# Patient Record
Sex: MALE | Race: ASIAN | NOT HISPANIC OR LATINO | Employment: OTHER | URBAN - METROPOLITAN AREA
[De-identification: names, ages, dates, MRNs, and addresses within clinical notes are randomized per-mention and may not be internally consistent; named-entity substitution may affect disease eponyms.]

---

## 2018-01-01 ENCOUNTER — ANESTHESIA (OUTPATIENT)
Dept: INTENSIVE CARE | Facility: HOSPITAL | Age: 63
DRG: 064 | End: 2018-01-01
Payer: MEDICARE

## 2018-01-01 ENCOUNTER — ANESTHESIA EVENT (OUTPATIENT)
Dept: INTENSIVE CARE | Facility: HOSPITAL | Age: 63
DRG: 064 | End: 2018-01-01
Payer: MEDICARE

## 2018-01-01 ENCOUNTER — HOSPITAL ENCOUNTER (INPATIENT)
Facility: HOSPITAL | Age: 63
LOS: 14 days | DRG: 064 | End: 2018-05-10
Attending: EMERGENCY MEDICINE | Admitting: HOSPITALIST
Payer: MEDICARE

## 2018-01-01 VITALS
DIASTOLIC BLOOD PRESSURE: 56 MMHG | BODY MASS INDEX: 22.89 KG/M2 | WEIGHT: 137.38 LBS | OXYGEN SATURATION: 60 % | TEMPERATURE: 99 F | SYSTOLIC BLOOD PRESSURE: 121 MMHG | HEIGHT: 65 IN

## 2018-01-01 DIAGNOSIS — R05.9 COUGH: ICD-10-CM

## 2018-01-01 DIAGNOSIS — N17.9 ACUTE RENAL FAILURE, UNSPECIFIED ACUTE RENAL FAILURE TYPE: Primary | ICD-10-CM

## 2018-01-01 DIAGNOSIS — I63.9 STROKE: ICD-10-CM

## 2018-01-01 DIAGNOSIS — I69.359 CVA, OLD, HEMIPARESIS: ICD-10-CM

## 2018-01-01 DIAGNOSIS — I63.9 CVA (CEREBRAL VASCULAR ACCIDENT): ICD-10-CM

## 2018-01-01 DIAGNOSIS — R53.1 WEAKNESS: ICD-10-CM

## 2018-01-01 LAB
ALBUMIN SERPL BCP-MCNC: 2 G/DL
ALBUMIN SERPL BCP-MCNC: 2.1 G/DL
ALBUMIN SERPL BCP-MCNC: 2.2 G/DL
ALBUMIN SERPL BCP-MCNC: 2.2 G/DL
ALBUMIN SERPL BCP-MCNC: 2.7 G/DL
ALBUMIN SERPL BCP-MCNC: 2.9 G/DL
ALBUMIN SERPL BCP-MCNC: 3.8 G/DL
ALLENS TEST: ABNORMAL
ALP SERPL-CCNC: 60 U/L
ALP SERPL-CCNC: 65 U/L
ALP SERPL-CCNC: 66 U/L
ALP SERPL-CCNC: 67 U/L
ALP SERPL-CCNC: 69 U/L
ALP SERPL-CCNC: 71 U/L
ALP SERPL-CCNC: 78 U/L
ALT SERPL W/O P-5'-P-CCNC: 22 U/L
ALT SERPL W/O P-5'-P-CCNC: 22 U/L
ALT SERPL W/O P-5'-P-CCNC: 27 U/L
ALT SERPL W/O P-5'-P-CCNC: 47 U/L
ALT SERPL W/O P-5'-P-CCNC: 67 U/L
ALT SERPL W/O P-5'-P-CCNC: 73 U/L
ALT SERPL W/O P-5'-P-CCNC: 73 U/L
ALT SERPL W/O P-5'-P-CCNC: 75 U/L
ALT SERPL W/O P-5'-P-CCNC: 76 U/L
ANION GAP SERPL CALC-SCNC: 10 MMOL/L
ANION GAP SERPL CALC-SCNC: 10 MMOL/L
ANION GAP SERPL CALC-SCNC: 11 MMOL/L
ANION GAP SERPL CALC-SCNC: 11 MMOL/L
ANION GAP SERPL CALC-SCNC: 12 MMOL/L
ANION GAP SERPL CALC-SCNC: 12 MMOL/L
ANION GAP SERPL CALC-SCNC: 14 MMOL/L
ANION GAP SERPL CALC-SCNC: 15 MMOL/L
ANION GAP SERPL CALC-SCNC: 7 MMOL/L
ANION GAP SERPL CALC-SCNC: 8 MMOL/L
ANION GAP SERPL CALC-SCNC: 9 MMOL/L
ANION GAP SERPL CALC-SCNC: 9 MMOL/L
AORTIC VALVE REGURGITATION: ABNORMAL
APTT BLDCRRT: 25.3 SEC
APTT BLDCRRT: 26.4 SEC
APTT BLDCRRT: 28.5 SEC
AST SERPL-CCNC: 20 U/L
AST SERPL-CCNC: 27 U/L
AST SERPL-CCNC: 30 U/L
AST SERPL-CCNC: 49 U/L
AST SERPL-CCNC: 57 U/L
AST SERPL-CCNC: 57 U/L
AST SERPL-CCNC: 60 U/L
AST SERPL-CCNC: 60 U/L
AST SERPL-CCNC: 65 U/L
BACTERIA #/AREA URNS HPF: ABNORMAL /HPF
BACTERIA #/AREA URNS HPF: ABNORMAL /HPF
BACTERIA BLD CULT: NORMAL
BACTERIA BLD CULT: NORMAL
BACTERIA SPEC AEROBE CULT: NORMAL
BACTERIA UR CULT: NO GROWTH
BASOPHILS # BLD AUTO: 0.01 K/UL
BASOPHILS # BLD AUTO: 0.02 K/UL
BASOPHILS # BLD AUTO: 0.03 K/UL
BASOPHILS # BLD AUTO: 0.03 K/UL
BASOPHILS NFR BLD: 0.1 %
BASOPHILS NFR BLD: 0.1 %
BASOPHILS NFR BLD: 0.2 %
BASOPHILS NFR BLD: 0.3 %
BASOPHILS NFR BLD: 0.5 %
BILIRUB SERPL-MCNC: 0.3 MG/DL
BILIRUB SERPL-MCNC: 0.4 MG/DL
BILIRUB SERPL-MCNC: 0.5 MG/DL
BILIRUB SERPL-MCNC: 0.7 MG/DL
BILIRUB UR QL STRIP: NEGATIVE
BILIRUB UR QL STRIP: NEGATIVE
BNP SERPL-MCNC: 1086 PG/ML
BNP SERPL-MCNC: 201 PG/ML
BNP SERPL-MCNC: 672 PG/ML
BUN SERPL-MCNC: 21 MG/DL
BUN SERPL-MCNC: 23 MG/DL
BUN SERPL-MCNC: 29 MG/DL
BUN SERPL-MCNC: 30 MG/DL
BUN SERPL-MCNC: 40 MG/DL
BUN SERPL-MCNC: 44 MG/DL
BUN SERPL-MCNC: 51 MG/DL
BUN SERPL-MCNC: 54 MG/DL
BUN SERPL-MCNC: 56 MG/DL
BUN SERPL-MCNC: 57 MG/DL
BUN SERPL-MCNC: 60 MG/DL
BUN SERPL-MCNC: 60 MG/DL
CALCIUM SERPL-MCNC: 8.3 MG/DL
CALCIUM SERPL-MCNC: 8.3 MG/DL
CALCIUM SERPL-MCNC: 8.4 MG/DL
CALCIUM SERPL-MCNC: 8.6 MG/DL
CALCIUM SERPL-MCNC: 8.6 MG/DL
CALCIUM SERPL-MCNC: 8.7 MG/DL
CALCIUM SERPL-MCNC: 8.8 MG/DL
CALCIUM SERPL-MCNC: 8.9 MG/DL
CALCIUM SERPL-MCNC: 9.9 MG/DL
CHLORIDE SERPL-SCNC: 105 MMOL/L
CHLORIDE SERPL-SCNC: 108 MMOL/L
CHLORIDE SERPL-SCNC: 108 MMOL/L
CHLORIDE SERPL-SCNC: 110 MMOL/L
CHLORIDE SERPL-SCNC: 111 MMOL/L
CHLORIDE SERPL-SCNC: 111 MMOL/L
CHLORIDE SERPL-SCNC: 112 MMOL/L
CHLORIDE SERPL-SCNC: 113 MMOL/L
CHLORIDE SERPL-SCNC: 113 MMOL/L
CHLORIDE SERPL-SCNC: 114 MMOL/L
CHOLEST SERPL-MCNC: 229 MG/DL
CHOLEST/HDLC SERPL: 4.7 {RATIO}
CK MB SERPL-MCNC: 5.7 NG/ML
CK MB SERPL-RTO: 0.5 %
CK SERPL-CCNC: 1252 U/L
CLARITY UR: CLEAR
CLARITY UR: CLEAR
CO2 SERPL-SCNC: 16 MMOL/L
CO2 SERPL-SCNC: 17 MMOL/L
CO2 SERPL-SCNC: 21 MMOL/L
CO2 SERPL-SCNC: 22 MMOL/L
CO2 SERPL-SCNC: 23 MMOL/L
CO2 SERPL-SCNC: 24 MMOL/L
CO2 SERPL-SCNC: 24 MMOL/L
CO2 SERPL-SCNC: 25 MMOL/L
CO2 SERPL-SCNC: 25 MMOL/L
CO2 SERPL-SCNC: 26 MMOL/L
COLOR UR: YELLOW
COLOR UR: YELLOW
CREAT SERPL-MCNC: 2 MG/DL
CREAT SERPL-MCNC: 2.2 MG/DL
CREAT SERPL-MCNC: 2.2 MG/DL
CREAT SERPL-MCNC: 2.4 MG/DL
CREAT SERPL-MCNC: 2.7 MG/DL
CREAT SERPL-MCNC: 2.7 MG/DL
CREAT SERPL-MCNC: 2.8 MG/DL
CREAT SERPL-MCNC: 2.8 MG/DL
CREAT SERPL-MCNC: 2.9 MG/DL
CREAT SERPL-MCNC: 3.1 MG/DL
DELSYS: ABNORMAL
DIASTOLIC DYSFUNCTION: YES
DIFFERENTIAL METHOD: ABNORMAL
EOSINOPHIL # BLD AUTO: 0 K/UL
EOSINOPHIL # BLD AUTO: 0 K/UL
EOSINOPHIL # BLD AUTO: 0.1 K/UL
EOSINOPHIL # BLD AUTO: 0.2 K/UL
EOSINOPHIL # BLD AUTO: 0.2 K/UL
EOSINOPHIL # BLD AUTO: 0.4 K/UL
EOSINOPHIL NFR BLD: 0.2 %
EOSINOPHIL NFR BLD: 0.6 %
EOSINOPHIL NFR BLD: 0.9 %
EOSINOPHIL NFR BLD: 1.6 %
EOSINOPHIL NFR BLD: 1.8 %
EOSINOPHIL NFR BLD: 1.8 %
EOSINOPHIL NFR BLD: 2.3 %
EOSINOPHIL NFR BLD: 3.5 %
EP: 5
ERYTHROCYTE [DISTWIDTH] IN BLOOD BY AUTOMATED COUNT: 13.1 %
ERYTHROCYTE [DISTWIDTH] IN BLOOD BY AUTOMATED COUNT: 13.3 %
ERYTHROCYTE [DISTWIDTH] IN BLOOD BY AUTOMATED COUNT: 13.4 %
ERYTHROCYTE [DISTWIDTH] IN BLOOD BY AUTOMATED COUNT: 13.4 %
ERYTHROCYTE [DISTWIDTH] IN BLOOD BY AUTOMATED COUNT: 13.5 %
ERYTHROCYTE [DISTWIDTH] IN BLOOD BY AUTOMATED COUNT: 13.6 %
ERYTHROCYTE [DISTWIDTH] IN BLOOD BY AUTOMATED COUNT: 13.6 %
ERYTHROCYTE [DISTWIDTH] IN BLOOD BY AUTOMATED COUNT: 13.7 %
ERYTHROCYTE [SEDIMENTATION RATE] IN BLOOD BY WESTERGREN METHOD: 15 MM/H
ERYTHROCYTE [SEDIMENTATION RATE] IN BLOOD BY WESTERGREN METHOD: 20 MM/H
EST. GFR  (AFRICAN AMERICAN): 23 ML/MIN/1.73 M^2
EST. GFR  (AFRICAN AMERICAN): 25 ML/MIN/1.73 M^2
EST. GFR  (AFRICAN AMERICAN): 27 ML/MIN/1.73 M^2
EST. GFR  (AFRICAN AMERICAN): 27 ML/MIN/1.73 M^2
EST. GFR  (AFRICAN AMERICAN): 28 ML/MIN/1.73 M^2
EST. GFR  (AFRICAN AMERICAN): 28 ML/MIN/1.73 M^2
EST. GFR  (AFRICAN AMERICAN): 32 ML/MIN/1.73 M^2
EST. GFR  (AFRICAN AMERICAN): 36 ML/MIN/1.73 M^2
EST. GFR  (AFRICAN AMERICAN): 36 ML/MIN/1.73 M^2
EST. GFR  (AFRICAN AMERICAN): 40 ML/MIN/1.73 M^2
EST. GFR  (NON AFRICAN AMERICAN): 20 ML/MIN/1.73 M^2
EST. GFR  (NON AFRICAN AMERICAN): 22 ML/MIN/1.73 M^2
EST. GFR  (NON AFRICAN AMERICAN): 23 ML/MIN/1.73 M^2
EST. GFR  (NON AFRICAN AMERICAN): 23 ML/MIN/1.73 M^2
EST. GFR  (NON AFRICAN AMERICAN): 24 ML/MIN/1.73 M^2
EST. GFR  (NON AFRICAN AMERICAN): 24 ML/MIN/1.73 M^2
EST. GFR  (NON AFRICAN AMERICAN): 28 ML/MIN/1.73 M^2
EST. GFR  (NON AFRICAN AMERICAN): 31 ML/MIN/1.73 M^2
EST. GFR  (NON AFRICAN AMERICAN): 31 ML/MIN/1.73 M^2
EST. GFR  (NON AFRICAN AMERICAN): 34 ML/MIN/1.73 M^2
ESTIMATED AVG GLUCOSE: 200 MG/DL
ESTIMATED AVG GLUCOSE: 200 MG/DL
ESTIMATED PA SYSTOLIC PRESSURE: 31.09
FIO2: 0.6
FIO2: 100
FIO2: 100
FIO2: 30
FIO2: 35
FIO2: 35
FIO2: 40
FLOW: 15
GLOBAL PERICARDIAL EFFUSION: ABNORMAL
GLUCOSE SERPL-MCNC: 120 MG/DL
GLUCOSE SERPL-MCNC: 134 MG/DL
GLUCOSE SERPL-MCNC: 134 MG/DL
GLUCOSE SERPL-MCNC: 146 MG/DL
GLUCOSE SERPL-MCNC: 162 MG/DL
GLUCOSE SERPL-MCNC: 172 MG/DL
GLUCOSE SERPL-MCNC: 202 MG/DL
GLUCOSE SERPL-MCNC: 208 MG/DL
GLUCOSE SERPL-MCNC: 208 MG/DL
GLUCOSE SERPL-MCNC: 262 MG/DL (ref 70–110)
GLUCOSE SERPL-MCNC: 265 MG/DL
GLUCOSE SERPL-MCNC: 278 MG/DL
GLUCOSE SERPL-MCNC: 292 MG/DL
GLUCOSE SERPL-MCNC: 296 MG/DL
GLUCOSE SERPL-MCNC: 345 MG/DL
GLUCOSE UR QL STRIP: ABNORMAL
GLUCOSE UR QL STRIP: ABNORMAL
GRAM STN SPEC: NORMAL
GRAN CASTS #/AREA URNS LPF: 2 /LPF
HBA1C MFR BLD HPLC: 8.6 %
HBA1C MFR BLD HPLC: 8.6 %
HCO3 UR-SCNC: 14.8 MMOL/L (ref 24–28)
HCO3 UR-SCNC: 15 MMOL/L (ref 24–28)
HCO3 UR-SCNC: 15.8 MMOL/L (ref 24–28)
HCO3 UR-SCNC: 16.2 MMOL/L (ref 24–28)
HCO3 UR-SCNC: 23 MMOL/L (ref 24–28)
HCO3 UR-SCNC: 24.4 MMOL/L (ref 24–28)
HCO3 UR-SCNC: 24.5 MMOL/L (ref 24–28)
HCO3 UR-SCNC: 24.8 MMOL/L (ref 24–28)
HCO3 UR-SCNC: 25.3 MMOL/L (ref 24–28)
HCO3 UR-SCNC: 26 MMOL/L (ref 24–28)
HCO3 UR-SCNC: 26 MMOL/L (ref 24–28)
HCO3 UR-SCNC: 26.2 MMOL/L (ref 24–28)
HCT VFR BLD AUTO: 31 %
HCT VFR BLD AUTO: 33.2 %
HCT VFR BLD AUTO: 34.7 %
HCT VFR BLD AUTO: 35.6 %
HCT VFR BLD AUTO: 35.8 %
HCT VFR BLD AUTO: 38.1 %
HCT VFR BLD AUTO: 38.1 %
HCT VFR BLD AUTO: 42.7 %
HCYS SERPL-SCNC: 13.7 UMOL/L
HDLC SERPL-MCNC: 49 MG/DL
HDLC SERPL: 21.4 %
HGB BLD-MCNC: 11 G/DL
HGB BLD-MCNC: 11 G/DL
HGB BLD-MCNC: 12 G/DL
HGB BLD-MCNC: 12 G/DL
HGB BLD-MCNC: 12.6 G/DL
HGB BLD-MCNC: 13.4 G/DL
HGB BLD-MCNC: 13.4 G/DL
HGB BLD-MCNC: 14.8 G/DL
HGB UR QL STRIP: ABNORMAL
HGB UR QL STRIP: ABNORMAL
HYALINE CASTS #/AREA URNS LPF: 0 /LPF
HYALINE CASTS #/AREA URNS LPF: 4 /LPF
INR PPP: 1
IP: 15
KETONES UR QL STRIP: ABNORMAL
KETONES UR QL STRIP: NEGATIVE
LDLC SERPL CALC-MCNC: 148 MG/DL
LEUKOCYTE ESTERASE UR QL STRIP: NEGATIVE
LEUKOCYTE ESTERASE UR QL STRIP: NEGATIVE
LYMPHOCYTES # BLD AUTO: 0.4 K/UL
LYMPHOCYTES # BLD AUTO: 0.5 K/UL
LYMPHOCYTES # BLD AUTO: 0.5 K/UL
LYMPHOCYTES # BLD AUTO: 0.6 K/UL
LYMPHOCYTES # BLD AUTO: 1 K/UL
LYMPHOCYTES NFR BLD: 11.2 %
LYMPHOCYTES NFR BLD: 3.8 %
LYMPHOCYTES NFR BLD: 5 %
LYMPHOCYTES NFR BLD: 5.6 %
LYMPHOCYTES NFR BLD: 7.1 %
LYMPHOCYTES NFR BLD: 7.3 %
LYMPHOCYTES NFR BLD: 7.8 %
LYMPHOCYTES NFR BLD: 8 %
MAGNESIUM SERPL-MCNC: 2 MG/DL
MAGNESIUM SERPL-MCNC: 2.4 MG/DL
MAGNESIUM SERPL-MCNC: 2.6 MG/DL
MCH RBC QN AUTO: 28.2 PG
MCH RBC QN AUTO: 28.4 PG
MCH RBC QN AUTO: 28.7 PG
MCH RBC QN AUTO: 28.7 PG
MCH RBC QN AUTO: 28.8 PG
MCH RBC QN AUTO: 28.9 PG
MCH RBC QN AUTO: 29 PG
MCH RBC QN AUTO: 29 PG
MCHC RBC AUTO-ENTMCNC: 33.1 G/DL
MCHC RBC AUTO-ENTMCNC: 33.7 G/DL
MCHC RBC AUTO-ENTMCNC: 34.6 G/DL
MCHC RBC AUTO-ENTMCNC: 34.7 G/DL
MCHC RBC AUTO-ENTMCNC: 35.2 G/DL
MCHC RBC AUTO-ENTMCNC: 35.5 G/DL
MCV RBC AUTO: 81 FL
MCV RBC AUTO: 82 FL
MCV RBC AUTO: 83 FL
MCV RBC AUTO: 84 FL
MCV RBC AUTO: 85 FL
MCV RBC AUTO: 85 FL
MICROSCOPIC COMMENT: ABNORMAL
MICROSCOPIC COMMENT: ABNORMAL
MIN VOL: 12.9
MIN VOL: 16
MIN VOL: 6.7
MIN VOL: 7.1
MIN VOL: 7.1
MIN VOL: 7.4
MIN VOL: 8.6
MIN VOL: 9.3
MITRAL VALVE REGURGITATION: ABNORMAL
MODE: ABNORMAL
MONOCYTES # BLD AUTO: 0.3 K/UL
MONOCYTES # BLD AUTO: 0.4 K/UL
MONOCYTES # BLD AUTO: 0.5 K/UL
MONOCYTES # BLD AUTO: 0.5 K/UL
MONOCYTES # BLD AUTO: 0.6 K/UL
MONOCYTES # BLD AUTO: 0.6 K/UL
MONOCYTES # BLD AUTO: 0.7 K/UL
MONOCYTES # BLD AUTO: 0.8 K/UL
MONOCYTES NFR BLD: 3.7 %
MONOCYTES NFR BLD: 4 %
MONOCYTES NFR BLD: 5 %
MONOCYTES NFR BLD: 5.2 %
MONOCYTES NFR BLD: 5.8 %
MONOCYTES NFR BLD: 7.8 %
MONOCYTES NFR BLD: 8.7 %
MONOCYTES NFR BLD: 9.6 %
NEUTROPHILS # BLD AUTO: 10.7 K/UL
NEUTROPHILS # BLD AUTO: 5 K/UL
NEUTROPHILS # BLD AUTO: 5.7 K/UL
NEUTROPHILS # BLD AUTO: 6.7 K/UL
NEUTROPHILS # BLD AUTO: 6.9 K/UL
NEUTROPHILS # BLD AUTO: 7.3 K/UL
NEUTROPHILS # BLD AUTO: 8.4 K/UL
NEUTROPHILS # BLD AUTO: 9.6 K/UL
NEUTROPHILS NFR BLD: 77 %
NEUTROPHILS NFR BLD: 80.1 %
NEUTROPHILS NFR BLD: 82.5 %
NEUTROPHILS NFR BLD: 85 %
NEUTROPHILS NFR BLD: 85.2 %
NEUTROPHILS NFR BLD: 87.2 %
NEUTROPHILS NFR BLD: 89.7 %
NEUTROPHILS NFR BLD: 90.8 %
NITRITE UR QL STRIP: NEGATIVE
NITRITE UR QL STRIP: NEGATIVE
NONHDLC SERPL-MCNC: 180 MG/DL
PCO2 BLDA: 26.3 MMHG (ref 35–45)
PCO2 BLDA: 30 MMHG (ref 35–45)
PCO2 BLDA: 30.1 MMHG (ref 35–45)
PCO2 BLDA: 30.6 MMHG (ref 35–45)
PCO2 BLDA: 31.7 MMHG (ref 35–45)
PCO2 BLDA: 32 MMHG (ref 35–45)
PCO2 BLDA: 33.5 MMHG (ref 35–45)
PCO2 BLDA: 33.8 MMHG (ref 35–45)
PCO2 BLDA: 33.9 MMHG (ref 35–45)
PCO2 BLDA: 34 MMHG (ref 35–45)
PCO2 BLDA: 34.2 MMHG (ref 35–45)
PCO2 BLDA: 35.2 MMHG (ref 35–45)
PEEP: 5
PH SMN: 7.29 [PH] (ref 7.35–7.45)
PH SMN: 7.33 [PH] (ref 7.35–7.45)
PH SMN: 7.34 [PH] (ref 7.35–7.45)
PH SMN: 7.37 [PH] (ref 7.35–7.45)
PH SMN: 7.43 [PH] (ref 7.35–7.45)
PH SMN: 7.47 [PH] (ref 7.35–7.45)
PH SMN: 7.48 [PH] (ref 7.35–7.45)
PH SMN: 7.49 [PH] (ref 7.35–7.45)
PH SMN: 7.49 [PH] (ref 7.35–7.45)
PH SMN: 7.52 [PH] (ref 7.35–7.45)
PH UR STRIP: 5 [PH] (ref 5–8)
PH UR STRIP: 5 [PH] (ref 5–8)
PHOSPHATE SERPL-MCNC: 2.8 MG/DL
PHOSPHATE SERPL-MCNC: 3 MG/DL
PHOSPHATE SERPL-MCNC: 3.2 MG/DL
PHOSPHATE SERPL-MCNC: 3.6 MG/DL
PIP: 18
PIP: 20
PIP: 20
PIP: 21
PIP: 22
PIP: 23
PIP: 24
PIP: 28
PIP: 31
PLATELET # BLD AUTO: 177 K/UL
PLATELET # BLD AUTO: 178 K/UL
PLATELET # BLD AUTO: 195 K/UL
PLATELET # BLD AUTO: 195 K/UL
PLATELET # BLD AUTO: 220 K/UL
PLATELET # BLD AUTO: 249 K/UL
PLATELET # BLD AUTO: 263 K/UL
PLATELET # BLD AUTO: 268 K/UL
PMV BLD AUTO: 10 FL
PMV BLD AUTO: 10.2 FL
PMV BLD AUTO: 10.2 FL
PMV BLD AUTO: 10.4 FL
PMV BLD AUTO: 10.4 FL
PMV BLD AUTO: 10.6 FL
PMV BLD AUTO: 10.7 FL
PMV BLD AUTO: 11 FL
PO2 BLDA: 103 MMHG (ref 80–100)
PO2 BLDA: 109 MMHG (ref 80–100)
PO2 BLDA: 137 MMHG (ref 80–100)
PO2 BLDA: 159 MMHG (ref 80–100)
PO2 BLDA: 227 MMHG (ref 80–100)
PO2 BLDA: 257 MMHG (ref 80–100)
PO2 BLDA: 68 MMHG (ref 80–100)
PO2 BLDA: 72 MMHG (ref 80–100)
PO2 BLDA: 75 MMHG (ref 80–100)
PO2 BLDA: 92 MMHG (ref 80–100)
PO2 BLDA: 97 MMHG (ref 80–100)
PO2 BLDA: 99 MMHG (ref 80–100)
POC BE: -1 MMOL/L
POC BE: -11 MMOL/L
POC BE: -9 MMOL/L
POC BE: 1 MMOL/L
POC BE: 2 MMOL/L
POC BE: 3 MMOL/L
POC SATURATED O2: 100 % (ref 95–100)
POC SATURATED O2: 93 % (ref 95–100)
POC SATURATED O2: 95 % (ref 95–100)
POC SATURATED O2: 95 % (ref 95–100)
POC SATURATED O2: 97 % (ref 95–100)
POC SATURATED O2: 98 % (ref 95–100)
POC SATURATED O2: 99 % (ref 95–100)
POC SATURATED O2: 99 % (ref 95–100)
POC TCO2: 16 MMOL/L (ref 23–27)
POC TCO2: 16 MMOL/L (ref 23–27)
POC TCO2: 17 MMOL/L (ref 23–27)
POC TCO2: 17 MMOL/L (ref 23–27)
POC TCO2: 24 MMOL/L (ref 23–27)
POC TCO2: 25 MMOL/L (ref 23–27)
POC TCO2: 25 MMOL/L (ref 23–27)
POC TCO2: 26 MMOL/L (ref 23–27)
POC TCO2: 26 MMOL/L (ref 23–27)
POC TCO2: 27 MMOL/L (ref 23–27)
POCT GLUCOSE: 106 MG/DL (ref 70–110)
POCT GLUCOSE: 121 MG/DL (ref 70–110)
POCT GLUCOSE: 126 MG/DL (ref 70–110)
POCT GLUCOSE: 130 MG/DL (ref 70–110)
POCT GLUCOSE: 132 MG/DL (ref 70–110)
POCT GLUCOSE: 133 MG/DL (ref 70–110)
POCT GLUCOSE: 134 MG/DL (ref 70–110)
POCT GLUCOSE: 153 MG/DL (ref 70–110)
POCT GLUCOSE: 154 MG/DL (ref 70–110)
POCT GLUCOSE: 158 MG/DL (ref 70–110)
POCT GLUCOSE: 159 MG/DL (ref 70–110)
POCT GLUCOSE: 161 MG/DL (ref 70–110)
POCT GLUCOSE: 164 MG/DL (ref 70–110)
POCT GLUCOSE: 168 MG/DL (ref 70–110)
POCT GLUCOSE: 169 MG/DL (ref 70–110)
POCT GLUCOSE: 176 MG/DL (ref 70–110)
POCT GLUCOSE: 177 MG/DL (ref 70–110)
POCT GLUCOSE: 178 MG/DL (ref 70–110)
POCT GLUCOSE: 180 MG/DL (ref 70–110)
POCT GLUCOSE: 185 MG/DL (ref 70–110)
POCT GLUCOSE: 185 MG/DL (ref 70–110)
POCT GLUCOSE: 191 MG/DL (ref 70–110)
POCT GLUCOSE: 192 MG/DL (ref 70–110)
POCT GLUCOSE: 193 MG/DL (ref 70–110)
POCT GLUCOSE: 196 MG/DL (ref 70–110)
POCT GLUCOSE: 207 MG/DL (ref 70–110)
POCT GLUCOSE: 208 MG/DL (ref 70–110)
POCT GLUCOSE: 210 MG/DL (ref 70–110)
POCT GLUCOSE: 211 MG/DL (ref 70–110)
POCT GLUCOSE: 211 MG/DL (ref 70–110)
POCT GLUCOSE: 217 MG/DL (ref 70–110)
POCT GLUCOSE: 232 MG/DL (ref 70–110)
POCT GLUCOSE: 236 MG/DL (ref 70–110)
POCT GLUCOSE: 248 MG/DL (ref 70–110)
POCT GLUCOSE: 251 MG/DL (ref 70–110)
POCT GLUCOSE: 252 MG/DL (ref 70–110)
POCT GLUCOSE: 252 MG/DL (ref 70–110)
POCT GLUCOSE: 267 MG/DL (ref 70–110)
POCT GLUCOSE: 270 MG/DL (ref 70–110)
POCT GLUCOSE: 271 MG/DL (ref 70–110)
POCT GLUCOSE: 271 MG/DL (ref 70–110)
POCT GLUCOSE: 276 MG/DL (ref 70–110)
POCT GLUCOSE: 293 MG/DL (ref 70–110)
POCT GLUCOSE: 297 MG/DL (ref 70–110)
POCT GLUCOSE: 331 MG/DL (ref 70–110)
POCT GLUCOSE: 368 MG/DL (ref 70–110)
POCT GLUCOSE: 67 MG/DL (ref 70–110)
POTASSIUM SERPL-SCNC: 2.9 MMOL/L
POTASSIUM SERPL-SCNC: 2.9 MMOL/L
POTASSIUM SERPL-SCNC: 3.2 MMOL/L
POTASSIUM SERPL-SCNC: 3.2 MMOL/L
POTASSIUM SERPL-SCNC: 3.4 MMOL/L
POTASSIUM SERPL-SCNC: 3.5 MMOL/L
POTASSIUM SERPL-SCNC: 3.6 MMOL/L
POTASSIUM SERPL-SCNC: 3.7 MMOL/L
POTASSIUM SERPL-SCNC: 3.8 MMOL/L
POTASSIUM SERPL-SCNC: 3.8 MMOL/L
POTASSIUM SERPL-SCNC: 4.1 MMOL/L
POTASSIUM SERPL-SCNC: 4.2 MMOL/L
POTASSIUM SERPL-SCNC: 4.3 MMOL/L
POTASSIUM SERPL-SCNC: 4.6 MMOL/L
PROT SERPL-MCNC: 5.9 G/DL
PROT SERPL-MCNC: 5.9 G/DL
PROT SERPL-MCNC: 6 G/DL
PROT SERPL-MCNC: 6.2 G/DL
PROT SERPL-MCNC: 6.4 G/DL
PROT SERPL-MCNC: 6.5 G/DL
PROT SERPL-MCNC: 7.7 G/DL
PROT UR QL STRIP: ABNORMAL
PROT UR QL STRIP: ABNORMAL
PROTHROMBIN TIME: 10 SEC
PROTHROMBIN TIME: 10.3 SEC
PROTHROMBIN TIME: 10.7 SEC
PROTHROMBIN TIME: 10.7 SEC
RBC # BLD AUTO: 3.83 M/UL
RBC # BLD AUTO: 3.9 M/UL
RBC # BLD AUTO: 4.15 M/UL
RBC # BLD AUTO: 4.18 M/UL
RBC # BLD AUTO: 4.35 M/UL
RBC # BLD AUTO: 4.62 M/UL
RBC # BLD AUTO: 4.65 M/UL
RBC # BLD AUTO: 5.22 M/UL
RBC #/AREA URNS HPF: 15 /HPF (ref 0–4)
RBC #/AREA URNS HPF: 4 /HPF (ref 0–4)
RETIRED EF AND QEF - SEE NOTES: 35 (ref 55–65)
SAMPLE: ABNORMAL
SITE: ABNORMAL
SODIUM SERPL-SCNC: 139 MMOL/L
SODIUM SERPL-SCNC: 141 MMOL/L
SODIUM SERPL-SCNC: 143 MMOL/L
SODIUM SERPL-SCNC: 144 MMOL/L
SODIUM SERPL-SCNC: 145 MMOL/L
SODIUM SERPL-SCNC: 146 MMOL/L
SODIUM SERPL-SCNC: 148 MMOL/L
SP GR UR STRIP: 1.02 (ref 1–1.03)
SP GR UR STRIP: 1.02 (ref 1–1.03)
SP02: 100
SP02: 100
SP02: 91
SP02: 97
SP02: 98
SP02: 99
SQUAMOUS #/AREA URNS HPF: 1 /HPF
SQUAMOUS #/AREA URNS HPF: 2 /HPF
TRICUSPID VALVE REGURGITATION: ABNORMAL
TRIGL SERPL-MCNC: 160 MG/DL
TROPONIN I SERPL DL<=0.01 NG/ML-MCNC: 0.04 NG/ML
TROPONIN I SERPL DL<=0.01 NG/ML-MCNC: 0.08 NG/ML
TSH SERPL DL<=0.005 MIU/L-ACNC: 2.12 UIU/ML
URN SPEC COLLECT METH UR: ABNORMAL
URN SPEC COLLECT METH UR: ABNORMAL
UROBILINOGEN UR STRIP-ACNC: NEGATIVE EU/DL
UROBILINOGEN UR STRIP-ACNC: NEGATIVE EU/DL
VT: 450
WBC # BLD AUTO: 11.29 K/UL
WBC # BLD AUTO: 11.77 K/UL
WBC # BLD AUTO: 6.27 K/UL
WBC # BLD AUTO: 6.56 K/UL
WBC # BLD AUTO: 8.42 K/UL
WBC # BLD AUTO: 8.63 K/UL
WBC # BLD AUTO: 8.64 K/UL
WBC # BLD AUTO: 9.3 K/UL
WBC #/AREA URNS HPF: 0 /HPF (ref 0–5)
WBC #/AREA URNS HPF: 4 /HPF (ref 0–5)
YEAST URNS QL MICRO: ABNORMAL
YEAST URNS QL MICRO: ABNORMAL

## 2018-01-01 PROCEDURE — 99232 SBSQ HOSP IP/OBS MODERATE 35: CPT | Mod: ,,, | Performed by: PSYCHIATRY & NEUROLOGY

## 2018-01-01 PROCEDURE — 27000221 HC OXYGEN, UP TO 24 HOURS

## 2018-01-01 PROCEDURE — 25000003 PHARM REV CODE 250: Performed by: INTERNAL MEDICINE

## 2018-01-01 PROCEDURE — G0425 INPT/ED TELECONSULT30: HCPCS | Mod: GT,,, | Performed by: PSYCHIATRY & NEUROLOGY

## 2018-01-01 PROCEDURE — 99233 SBSQ HOSP IP/OBS HIGH 50: CPT | Mod: ,,, | Performed by: INTERNAL MEDICINE

## 2018-01-01 PROCEDURE — 99900026 HC AIRWAY MAINTENANCE (STAT)

## 2018-01-01 PROCEDURE — 36415 COLL VENOUS BLD VENIPUNCTURE: CPT

## 2018-01-01 PROCEDURE — 97165 OT EVAL LOW COMPLEX 30 MIN: CPT | Performed by: SPECIALIST

## 2018-01-01 PROCEDURE — 20000000 HC ICU ROOM

## 2018-01-01 PROCEDURE — 25000003 PHARM REV CODE 250: Performed by: HOSPITALIST

## 2018-01-01 PROCEDURE — 25000242 PHARM REV CODE 250 ALT 637 W/ HCPCS: Performed by: HOSPITALIST

## 2018-01-01 PROCEDURE — 27100171 HC OXYGEN HIGH FLOW UP TO 24 HOURS

## 2018-01-01 PROCEDURE — 99900035 HC TECH TIME PER 15 MIN (STAT)

## 2018-01-01 PROCEDURE — 63600175 PHARM REV CODE 636 W HCPCS: Performed by: HOSPITALIST

## 2018-01-01 PROCEDURE — C9113 INJ PANTOPRAZOLE SODIUM, VIA: HCPCS | Performed by: EMERGENCY MEDICINE

## 2018-01-01 PROCEDURE — S0028 INJECTION, FAMOTIDINE, 20 MG: HCPCS | Performed by: INTERNAL MEDICINE

## 2018-01-01 PROCEDURE — 87086 URINE CULTURE/COLONY COUNT: CPT

## 2018-01-01 PROCEDURE — 80053 COMPREHEN METABOLIC PANEL: CPT

## 2018-01-01 PROCEDURE — 85610 PROTHROMBIN TIME: CPT

## 2018-01-01 PROCEDURE — 27200966 HC CLOSED SUCTION SYSTEM

## 2018-01-01 PROCEDURE — 93306 TTE W/DOPPLER COMPLETE: CPT

## 2018-01-01 PROCEDURE — 80069 RENAL FUNCTION PANEL: CPT

## 2018-01-01 PROCEDURE — 84484 ASSAY OF TROPONIN QUANT: CPT

## 2018-01-01 PROCEDURE — G8988 SELF CARE GOAL STATUS: HCPCS | Mod: CJ | Performed by: SPECIALIST

## 2018-01-01 PROCEDURE — A4216 STERILE WATER/SALINE, 10 ML: HCPCS | Performed by: HOSPITALIST

## 2018-01-01 PROCEDURE — 83735 ASSAY OF MAGNESIUM: CPT

## 2018-01-01 PROCEDURE — 81000 URINALYSIS NONAUTO W/SCOPE: CPT

## 2018-01-01 PROCEDURE — 94761 N-INVAS EAR/PLS OXIMETRY MLT: CPT

## 2018-01-01 PROCEDURE — 94640 AIRWAY INHALATION TREATMENT: CPT

## 2018-01-01 PROCEDURE — 94003 VENT MGMT INPAT SUBQ DAY: CPT

## 2018-01-01 PROCEDURE — 82803 BLOOD GASES ANY COMBINATION: CPT

## 2018-01-01 PROCEDURE — 63600175 PHARM REV CODE 636 W HCPCS: Performed by: INTERNAL MEDICINE

## 2018-01-01 PROCEDURE — 36600 WITHDRAWAL OF ARTERIAL BLOOD: CPT

## 2018-01-01 PROCEDURE — G8987 SELF CARE CURRENT STATUS: HCPCS | Mod: CL | Performed by: SPECIALIST

## 2018-01-01 PROCEDURE — 97116 GAIT TRAINING THERAPY: CPT

## 2018-01-01 PROCEDURE — 92526 ORAL FUNCTION THERAPY: CPT

## 2018-01-01 PROCEDURE — 97530 THERAPEUTIC ACTIVITIES: CPT

## 2018-01-01 PROCEDURE — 85730 THROMBOPLASTIN TIME PARTIAL: CPT

## 2018-01-01 PROCEDURE — 85025 COMPLETE CBC W/AUTO DIFF WBC: CPT

## 2018-01-01 PROCEDURE — 83880 ASSAY OF NATRIURETIC PEPTIDE: CPT

## 2018-01-01 PROCEDURE — 63600175 PHARM REV CODE 636 W HCPCS: Performed by: EMERGENCY MEDICINE

## 2018-01-01 PROCEDURE — G8996 SWALLOW CURRENT STATUS: HCPCS | Mod: CJ

## 2018-01-01 PROCEDURE — 25000003 PHARM REV CODE 250: Performed by: EMERGENCY MEDICINE

## 2018-01-01 PROCEDURE — 96365 THER/PROPH/DIAG IV INF INIT: CPT

## 2018-01-01 PROCEDURE — 11000001 HC ACUTE MED/SURG PRIVATE ROOM

## 2018-01-01 PROCEDURE — 99285 EMERGENCY DEPT VISIT HI MDM: CPT | Mod: 25

## 2018-01-01 PROCEDURE — 25000003 PHARM REV CODE 250: Performed by: PSYCHIATRY & NEUROLOGY

## 2018-01-01 PROCEDURE — 97112 NEUROMUSCULAR REEDUCATION: CPT

## 2018-01-01 PROCEDURE — 82550 ASSAY OF CK (CPK): CPT

## 2018-01-01 PROCEDURE — 0BH17EZ INSERTION OF ENDOTRACHEAL AIRWAY INTO TRACHEA, VIA NATURAL OR ARTIFICIAL OPENING: ICD-10-PCS | Performed by: HOSPITALIST

## 2018-01-01 PROCEDURE — 99232 SBSQ HOSP IP/OBS MODERATE 35: CPT | Mod: ,,, | Performed by: INTERNAL MEDICINE

## 2018-01-01 PROCEDURE — 99291 CRITICAL CARE FIRST HOUR: CPT | Mod: ,,, | Performed by: NEUROLOGICAL SURGERY

## 2018-01-01 PROCEDURE — 97162 PT EVAL MOD COMPLEX 30 MIN: CPT

## 2018-01-01 PROCEDURE — 12000002 HC ACUTE/MED SURGE SEMI-PRIVATE ROOM

## 2018-01-01 PROCEDURE — 99223 1ST HOSP IP/OBS HIGH 75: CPT | Mod: ,,, | Performed by: INTERNAL MEDICINE

## 2018-01-01 PROCEDURE — 80061 LIPID PANEL: CPT

## 2018-01-01 PROCEDURE — 83036 HEMOGLOBIN GLYCOSYLATED A1C: CPT

## 2018-01-01 PROCEDURE — 99222 1ST HOSP IP/OBS MODERATE 55: CPT | Mod: ,,, | Performed by: PSYCHIATRY & NEUROLOGY

## 2018-01-01 PROCEDURE — 31720 CLEARANCE OF AIRWAYS: CPT

## 2018-01-01 PROCEDURE — 82962 GLUCOSE BLOOD TEST: CPT

## 2018-01-01 PROCEDURE — S0077 INJECTION, CLINDAMYCIN PHOSP: HCPCS | Performed by: INTERNAL MEDICINE

## 2018-01-01 PROCEDURE — 93306 TTE W/DOPPLER COMPLETE: CPT | Mod: 26,,, | Performed by: INTERNAL MEDICINE

## 2018-01-01 PROCEDURE — 87205 SMEAR GRAM STAIN: CPT

## 2018-01-01 PROCEDURE — S0028 INJECTION, FAMOTIDINE, 20 MG: HCPCS | Performed by: HOSPITALIST

## 2018-01-01 PROCEDURE — 92610 EVALUATE SWALLOWING FUNCTION: CPT

## 2018-01-01 PROCEDURE — 84443 ASSAY THYROID STIM HORMONE: CPT

## 2018-01-01 PROCEDURE — 27000190 HC CPAP FULL FACE MASK W/VALVE

## 2018-01-01 PROCEDURE — 5A1955Z RESPIRATORY VENTILATION, GREATER THAN 96 CONSECUTIVE HOURS: ICD-10-PCS | Performed by: HOSPITALIST

## 2018-01-01 PROCEDURE — 93010 ELECTROCARDIOGRAM REPORT: CPT | Mod: 76,,, | Performed by: INTERNAL MEDICINE

## 2018-01-01 PROCEDURE — 83090 ASSAY OF HOMOCYSTEINE: CPT

## 2018-01-01 PROCEDURE — 80048 BASIC METABOLIC PNL TOTAL CA: CPT

## 2018-01-01 PROCEDURE — 94002 VENT MGMT INPAT INIT DAY: CPT

## 2018-01-01 PROCEDURE — 87070 CULTURE OTHR SPECIMN AEROBIC: CPT

## 2018-01-01 PROCEDURE — G8997 SWALLOW GOAL STATUS: HCPCS | Mod: CI

## 2018-01-01 PROCEDURE — 99291 CRITICAL CARE FIRST HOUR: CPT | Mod: ,,, | Performed by: INTERNAL MEDICINE

## 2018-01-01 PROCEDURE — G8978 MOBILITY CURRENT STATUS: HCPCS | Mod: CK

## 2018-01-01 PROCEDURE — 82553 CREATINE MB FRACTION: CPT

## 2018-01-01 PROCEDURE — 63600175 PHARM REV CODE 636 W HCPCS

## 2018-01-01 PROCEDURE — 93010 ELECTROCARDIOGRAM REPORT: CPT | Mod: ,,, | Performed by: INTERNAL MEDICINE

## 2018-01-01 PROCEDURE — 87186 SC STD MICRODIL/AGAR DIL: CPT

## 2018-01-01 PROCEDURE — 87040 BLOOD CULTURE FOR BACTERIA: CPT

## 2018-01-01 PROCEDURE — 87077 CULTURE AEROBIC IDENTIFY: CPT | Mod: 59

## 2018-01-01 PROCEDURE — 84100 ASSAY OF PHOSPHORUS: CPT

## 2018-01-01 PROCEDURE — G8998 SWALLOW D/C STATUS: HCPCS | Mod: CN

## 2018-01-01 PROCEDURE — 97530 THERAPEUTIC ACTIVITIES: CPT | Performed by: SPECIALIST

## 2018-01-01 PROCEDURE — G8979 MOBILITY GOAL STATUS: HCPCS | Mod: CI

## 2018-01-01 RX ORDER — ASPIRIN 325 MG
325 TABLET, DELAYED RELEASE (ENTERIC COATED) ORAL DAILY
Status: DISCONTINUED | OUTPATIENT
Start: 2018-01-01 | End: 2018-01-01

## 2018-01-01 RX ORDER — DEXTROMETHORPHAN POLISTIREX 30 MG/5 ML
1 SUSPENSION, EXTENDED RELEASE 12 HR ORAL DAILY PRN
Status: DISCONTINUED | OUTPATIENT
Start: 2018-01-01 | End: 2018-01-01 | Stop reason: HOSPADM

## 2018-01-01 RX ORDER — SODIUM CHLORIDE 9 MG/ML
1000 INJECTION, SOLUTION INTRAVENOUS
Status: COMPLETED | OUTPATIENT
Start: 2018-01-01 | End: 2018-01-01

## 2018-01-01 RX ORDER — METOPROLOL TARTRATE 25 MG/1
25 TABLET, FILM COATED ORAL 2 TIMES DAILY
Status: DISCONTINUED | OUTPATIENT
Start: 2018-01-01 | End: 2018-01-01

## 2018-01-01 RX ORDER — ACETAMINOPHEN 10 MG/ML
1000 INJECTION, SOLUTION INTRAVENOUS EVERY 8 HOURS
Status: DISCONTINUED | OUTPATIENT
Start: 2018-01-01 | End: 2018-01-01

## 2018-01-01 RX ORDER — GLUCAGON 1 MG
1 KIT INJECTION
Status: DISCONTINUED | OUTPATIENT
Start: 2018-01-01 | End: 2018-01-01 | Stop reason: HOSPADM

## 2018-01-01 RX ORDER — FENTANYL CITRATE-0.9 % NACL/PF 10 MCG/ML
25 PLASTIC BAG, INJECTION (ML) INTRAVENOUS CONTINUOUS
Status: DISCONTINUED | OUTPATIENT
Start: 2018-01-01 | End: 2018-01-01

## 2018-01-01 RX ORDER — FAMOTIDINE 10 MG/ML
20 INJECTION INTRAVENOUS 2 TIMES DAILY
Status: DISCONTINUED | OUTPATIENT
Start: 2018-01-01 | End: 2018-01-01

## 2018-01-01 RX ORDER — IPRATROPIUM BROMIDE AND ALBUTEROL SULFATE 2.5; .5 MG/3ML; MG/3ML
3 SOLUTION RESPIRATORY (INHALATION) EVERY 4 HOURS PRN
Status: DISCONTINUED | OUTPATIENT
Start: 2018-01-01 | End: 2018-01-01 | Stop reason: HOSPADM

## 2018-01-01 RX ORDER — FENTANYL CITRATE 50 UG/ML
50 INJECTION, SOLUTION INTRAMUSCULAR; INTRAVENOUS
Status: ACTIVE | OUTPATIENT
Start: 2018-01-01 | End: 2018-01-01

## 2018-01-01 RX ORDER — ATORVASTATIN CALCIUM 40 MG/1
80 TABLET, FILM COATED ORAL DAILY
Status: DISCONTINUED | OUTPATIENT
Start: 2018-01-01 | End: 2018-01-01

## 2018-01-01 RX ORDER — ASPIRIN 600 MG/1
300 SUPPOSITORY RECTAL
Status: COMPLETED | OUTPATIENT
Start: 2018-01-01 | End: 2018-01-01

## 2018-01-01 RX ORDER — POLYETHYLENE GLYCOL 3350 17 G/17G
17 POWDER, FOR SOLUTION ORAL DAILY
Status: DISCONTINUED | OUTPATIENT
Start: 2018-01-01 | End: 2018-01-01

## 2018-01-01 RX ORDER — PROPOFOL 10 MG/ML
40 INJECTION, EMULSION INTRAVENOUS ONCE
Status: COMPLETED | OUTPATIENT
Start: 2018-01-01 | End: 2018-01-01

## 2018-01-01 RX ORDER — PROMETHAZINE HYDROCHLORIDE 25 MG/1
25 SUPPOSITORY RECTAL EVERY 6 HOURS PRN
Status: DISCONTINUED | OUTPATIENT
Start: 2018-01-01 | End: 2018-01-01 | Stop reason: HOSPADM

## 2018-01-01 RX ORDER — SODIUM CHLORIDE 9 MG/ML
1000 INJECTION, SOLUTION INTRAVENOUS CONTINUOUS
Status: ACTIVE | OUTPATIENT
Start: 2018-01-01 | End: 2018-01-01

## 2018-01-01 RX ORDER — MOXIFLOXACIN HYDROCHLORIDE 400 MG/1
400 TABLET ORAL DAILY
Status: DISCONTINUED | OUTPATIENT
Start: 2018-01-01 | End: 2018-01-01

## 2018-01-01 RX ORDER — LORAZEPAM 0.5 MG/1
1 TABLET ORAL EVERY 30 MIN PRN
Status: DISCONTINUED | OUTPATIENT
Start: 2018-01-01 | End: 2018-01-01

## 2018-01-01 RX ORDER — AMLODIPINE BESYLATE 10 MG/1
10 TABLET ORAL DAILY
COMMUNITY

## 2018-01-01 RX ORDER — DOXAZOSIN 1 MG/1
2 TABLET ORAL NIGHTLY
COMMUNITY

## 2018-01-01 RX ORDER — AMOXICILLIN 250 MG
1 CAPSULE ORAL 2 TIMES DAILY
Status: DISCONTINUED | OUTPATIENT
Start: 2018-01-01 | End: 2018-01-01

## 2018-01-01 RX ORDER — MORPHINE SULFATE 10 MG/ML
2 INJECTION INTRAMUSCULAR; INTRAVENOUS; SUBCUTANEOUS
Status: DISCONTINUED | OUTPATIENT
Start: 2018-01-01 | End: 2018-01-01 | Stop reason: HOSPADM

## 2018-01-01 RX ORDER — INSULIN ASPART 100 [IU]/ML
1-10 INJECTION, SOLUTION INTRAVENOUS; SUBCUTANEOUS EVERY 6 HOURS PRN
Status: DISCONTINUED | OUTPATIENT
Start: 2018-01-01 | End: 2018-01-01 | Stop reason: HOSPADM

## 2018-01-01 RX ORDER — PROPOFOL 10 MG/ML
INJECTION, EMULSION INTRAVENOUS
Status: COMPLETED
Start: 2018-01-01 | End: 2018-01-01

## 2018-01-01 RX ORDER — CLINDAMYCIN PHOSPHATE 600 MG/50ML
600 INJECTION, SOLUTION INTRAVENOUS
Status: DISCONTINUED | OUTPATIENT
Start: 2018-01-01 | End: 2018-01-01

## 2018-01-01 RX ORDER — ASPIRIN 325 MG
325 TABLET ORAL DAILY
Status: DISCONTINUED | OUTPATIENT
Start: 2018-01-01 | End: 2018-01-01

## 2018-01-01 RX ORDER — FENTANYL CITRATE 50 UG/ML
50 INJECTION, SOLUTION INTRAMUSCULAR; INTRAVENOUS
Status: DISCONTINUED | OUTPATIENT
Start: 2018-01-01 | End: 2018-01-01 | Stop reason: HOSPADM

## 2018-01-01 RX ORDER — LORAZEPAM 2 MG/ML
2 INJECTION INTRAMUSCULAR
Status: DISCONTINUED | OUTPATIENT
Start: 2018-01-01 | End: 2018-01-01 | Stop reason: HOSPADM

## 2018-01-01 RX ORDER — ONDANSETRON 4 MG/1
8 TABLET, ORALLY DISINTEGRATING ORAL EVERY 8 HOURS PRN
Status: DISCONTINUED | OUTPATIENT
Start: 2018-01-01 | End: 2018-01-01 | Stop reason: HOSPADM

## 2018-01-01 RX ORDER — NATEGLINIDE 120 MG/1
120 TABLET ORAL DAILY
COMMUNITY

## 2018-01-01 RX ORDER — CLOPIDOGREL BISULFATE 75 MG/1
75 TABLET ORAL DAILY
COMMUNITY

## 2018-01-01 RX ORDER — GLYCOPYRROLATE 0.2 MG/ML
0.1 INJECTION INTRAMUSCULAR; INTRAVENOUS 3 TIMES DAILY PRN
Status: DISCONTINUED | OUTPATIENT
Start: 2018-01-01 | End: 2018-01-01 | Stop reason: HOSPADM

## 2018-01-01 RX ORDER — CHLORHEXIDINE GLUCONATE ORAL RINSE 1.2 MG/ML
15 SOLUTION DENTAL 2 TIMES DAILY
Status: DISCONTINUED | OUTPATIENT
Start: 2018-01-01 | End: 2018-01-01 | Stop reason: HOSPADM

## 2018-01-01 RX ORDER — ATORVASTATIN CALCIUM 40 MG/1
40 TABLET, FILM COATED ORAL DAILY
COMMUNITY

## 2018-01-01 RX ORDER — CLOPIDOGREL BISULFATE 75 MG/1
75 TABLET ORAL DAILY
Status: DISCONTINUED | OUTPATIENT
Start: 2018-01-01 | End: 2018-01-01

## 2018-01-01 RX ORDER — FAMOTIDINE 10 MG/ML
20 INJECTION INTRAVENOUS DAILY
Status: DISCONTINUED | OUTPATIENT
Start: 2018-01-01 | End: 2018-01-01 | Stop reason: HOSPADM

## 2018-01-01 RX ORDER — PROPOFOL 10 MG/ML
5 INJECTION, EMULSION INTRAVENOUS CONTINUOUS
Status: DISCONTINUED | OUTPATIENT
Start: 2018-01-01 | End: 2018-01-01

## 2018-01-01 RX ORDER — ASPIRIN 300 MG/1
300 SUPPOSITORY RECTAL DAILY
Status: DISCONTINUED | OUTPATIENT
Start: 2018-01-01 | End: 2018-01-01

## 2018-01-01 RX ORDER — ENOXAPARIN SODIUM 100 MG/ML
30 INJECTION SUBCUTANEOUS EVERY 24 HOURS
Status: DISCONTINUED | OUTPATIENT
Start: 2018-01-01 | End: 2018-01-01

## 2018-01-01 RX ORDER — FUROSEMIDE 10 MG/ML
60 INJECTION INTRAMUSCULAR; INTRAVENOUS ONCE
Status: COMPLETED | OUTPATIENT
Start: 2018-01-01 | End: 2018-01-01

## 2018-01-01 RX ORDER — CHLORHEXIDINE GLUCONATE ORAL RINSE 1.2 MG/ML
15 SOLUTION DENTAL 2 TIMES DAILY
Status: DISCONTINUED | OUTPATIENT
Start: 2018-01-01 | End: 2018-01-01 | Stop reason: SDUPTHER

## 2018-01-01 RX ORDER — INSULIN ASPART 100 [IU]/ML
8 INJECTION, SOLUTION INTRAVENOUS; SUBCUTANEOUS EVERY 6 HOURS
Status: DISCONTINUED | OUTPATIENT
Start: 2018-01-01 | End: 2018-01-01

## 2018-01-01 RX ORDER — ASPIRIN 300 MG/1
300 SUPPOSITORY RECTAL EVERY 6 HOURS PRN
Status: DISCONTINUED | OUTPATIENT
Start: 2018-01-01 | End: 2018-01-01

## 2018-01-01 RX ORDER — CEFTRIAXONE 1 G/1
1 INJECTION, POWDER, FOR SOLUTION INTRAMUSCULAR; INTRAVENOUS
Status: DISCONTINUED | OUTPATIENT
Start: 2018-01-01 | End: 2018-01-01

## 2018-01-01 RX ORDER — HEPARIN SODIUM 5000 [USP'U]/ML
5000 INJECTION, SOLUTION INTRAVENOUS; SUBCUTANEOUS EVERY 8 HOURS
Status: DISCONTINUED | OUTPATIENT
Start: 2018-01-01 | End: 2018-01-01

## 2018-01-01 RX ORDER — SODIUM CHLORIDE 9 MG/ML
INJECTION, SOLUTION INTRAVENOUS CONTINUOUS
Status: DISCONTINUED | OUTPATIENT
Start: 2018-01-01 | End: 2018-01-01

## 2018-01-01 RX ORDER — FUROSEMIDE 10 MG/ML
40 INJECTION INTRAMUSCULAR; INTRAVENOUS ONCE
Status: COMPLETED | OUTPATIENT
Start: 2018-01-01 | End: 2018-01-01

## 2018-01-01 RX ORDER — POLYETHYLENE GLYCOL 3350 17 G/17G
17 POWDER, FOR SOLUTION ORAL 2 TIMES DAILY
Status: DISCONTINUED | OUTPATIENT
Start: 2018-01-01 | End: 2018-01-01

## 2018-01-01 RX ORDER — CEPHALEXIN 500 MG/1
500 CAPSULE ORAL EVERY 6 HOURS
COMMUNITY

## 2018-01-01 RX ORDER — SODIUM CHLORIDE 450 MG/100ML
1000 INJECTION, SOLUTION INTRAVENOUS
Status: COMPLETED | OUTPATIENT
Start: 2018-01-01 | End: 2018-01-01

## 2018-01-01 RX ORDER — LINEZOLID 100 MG/5ML
600 GRANULE, FOR SUSPENSION ORAL EVERY 12 HOURS
Status: DISCONTINUED | OUTPATIENT
Start: 2018-01-01 | End: 2018-01-01

## 2018-01-01 RX ORDER — SODIUM CHLORIDE 0.9 % (FLUSH) 0.9 %
3 SYRINGE (ML) INJECTION EVERY 8 HOURS
Status: DISCONTINUED | OUTPATIENT
Start: 2018-01-01 | End: 2018-01-01 | Stop reason: HOSPADM

## 2018-01-01 RX ORDER — MORPHINE SULFATE ORAL SOLUTION 10 MG/5ML
5 SOLUTION ORAL
Status: DISCONTINUED | OUTPATIENT
Start: 2018-01-01 | End: 2018-01-01

## 2018-01-01 RX ORDER — CHLORTHALIDONE 25 MG/1
25 TABLET ORAL DAILY
COMMUNITY

## 2018-01-01 RX ORDER — LABETALOL HYDROCHLORIDE 5 MG/ML
10 INJECTION, SOLUTION INTRAVENOUS
Status: DISCONTINUED | OUTPATIENT
Start: 2018-01-01 | End: 2018-01-01

## 2018-01-01 RX ADMIN — CLINDAMYCIN IN 5 PERCENT DEXTROSE 600 MG: 12 INJECTION, SOLUTION INTRAVENOUS at 02:04

## 2018-01-01 RX ADMIN — Medication 3 ML: at 06:04

## 2018-01-01 RX ADMIN — INSULIN ASPART 2 UNITS: 100 INJECTION, SOLUTION INTRAVENOUS; SUBCUTANEOUS at 05:05

## 2018-01-01 RX ADMIN — INSULIN ASPART 2 UNITS: 100 INJECTION, SOLUTION INTRAVENOUS; SUBCUTANEOUS at 12:04

## 2018-01-01 RX ADMIN — ENOXAPARIN SODIUM 30 MG: 100 INJECTION SUBCUTANEOUS at 05:05

## 2018-01-01 RX ADMIN — LINEZOLID 600 MG: 100 SUSPENSION ORAL at 08:05

## 2018-01-01 RX ADMIN — INSULIN ASPART 4 UNITS: 100 INJECTION, SOLUTION INTRAVENOUS; SUBCUTANEOUS at 12:05

## 2018-01-01 RX ADMIN — DOCUSATE SODIUM AND SENNOSIDES 1 TABLET: 8.6; 5 TABLET, FILM COATED ORAL at 08:05

## 2018-01-01 RX ADMIN — CHLORHEXIDINE GLUCONATE 15 ML: 1.2 RINSE ORAL at 09:05

## 2018-01-01 RX ADMIN — ATORVASTATIN CALCIUM 80 MG: 40 TABLET, FILM COATED ORAL at 09:05

## 2018-01-01 RX ADMIN — MORPHINE SULFATE 1 MG/HR: 10 INJECTION INTRAMUSCULAR; INTRAVENOUS; SUBCUTANEOUS at 05:05

## 2018-01-01 RX ADMIN — FAMOTIDINE 20 MG: 10 INJECTION INTRAVENOUS at 08:05

## 2018-01-01 RX ADMIN — FAMOTIDINE 20 MG: 10 INJECTION INTRAVENOUS at 12:05

## 2018-01-01 RX ADMIN — METOPROLOL TARTRATE 25 MG: 25 TABLET ORAL at 09:05

## 2018-01-01 RX ADMIN — Medication 3 ML: at 02:05

## 2018-01-01 RX ADMIN — DOCUSATE SODIUM AND SENNOSIDES 1 TABLET: 8.6; 5 TABLET, FILM COATED ORAL at 09:04

## 2018-01-01 RX ADMIN — CLINDAMYCIN IN 5 PERCENT DEXTROSE 600 MG: 12 INJECTION, SOLUTION INTRAVENOUS at 06:04

## 2018-01-01 RX ADMIN — INSULIN ASPART 8 UNITS: 100 INJECTION, SOLUTION INTRAVENOUS; SUBCUTANEOUS at 05:05

## 2018-01-01 RX ADMIN — IPRATROPIUM BROMIDE AND ALBUTEROL SULFATE 3 ML: .5; 2.5 SOLUTION RESPIRATORY (INHALATION) at 07:05

## 2018-01-01 RX ADMIN — POLYETHYLENE GLYCOL 3350 17 G: 17 POWDER, FOR SOLUTION ORAL at 08:05

## 2018-01-01 RX ADMIN — INSULIN ASPART 10 UNITS: 100 INJECTION, SOLUTION INTRAVENOUS; SUBCUTANEOUS at 05:05

## 2018-01-01 RX ADMIN — ASPIRIN 300 MG: 300 SUPPOSITORY RECTAL at 01:04

## 2018-01-01 RX ADMIN — INSULIN ASPART 2 UNITS: 100 INJECTION, SOLUTION INTRAVENOUS; SUBCUTANEOUS at 12:05

## 2018-01-01 RX ADMIN — MORPHINE SULFATE 2 MG: 10 INJECTION INTRAVENOUS at 04:05

## 2018-01-01 RX ADMIN — METOPROLOL TARTRATE 25 MG: 25 TABLET ORAL at 10:05

## 2018-01-01 RX ADMIN — METOPROLOL TARTRATE 25 MG: 25 TABLET ORAL at 08:05

## 2018-01-01 RX ADMIN — MORPHINE SULFATE 2 MG: 10 INJECTION INTRAVENOUS at 03:05

## 2018-01-01 RX ADMIN — DOCUSATE SODIUM AND SENNOSIDES 1 TABLET: 8.6; 5 TABLET, FILM COATED ORAL at 09:05

## 2018-01-01 RX ADMIN — INSULIN ASPART 2 UNITS: 100 INJECTION, SOLUTION INTRAVENOUS; SUBCUTANEOUS at 11:04

## 2018-01-01 RX ADMIN — CHLORHEXIDINE GLUCONATE 15 ML: 1.2 RINSE ORAL at 10:05

## 2018-01-01 RX ADMIN — SODIUM CHLORIDE 1000 ML: 0.9 INJECTION, SOLUTION INTRAVENOUS at 05:04

## 2018-01-01 RX ADMIN — CHLORHEXIDINE GLUCONATE 15 ML: 1.2 RINSE ORAL at 08:05

## 2018-01-01 RX ADMIN — LORAZEPAM 2 MG: 2 INJECTION, SOLUTION INTRAMUSCULAR; INTRAVENOUS at 04:05

## 2018-01-01 RX ADMIN — Medication 3 ML: at 10:04

## 2018-01-01 RX ADMIN — HEPARIN SODIUM 5000 UNITS: 5000 INJECTION, SOLUTION INTRAVENOUS; SUBCUTANEOUS at 09:05

## 2018-01-01 RX ADMIN — POLYETHYLENE GLYCOL 3350 17 G: 17 POWDER, FOR SOLUTION ORAL at 09:05

## 2018-01-01 RX ADMIN — IPRATROPIUM BROMIDE AND ALBUTEROL SULFATE 3 ML: .5; 2.5 SOLUTION RESPIRATORY (INHALATION) at 05:05

## 2018-01-01 RX ADMIN — INSULIN ASPART 2 UNITS: 100 INJECTION, SOLUTION INTRAVENOUS; SUBCUTANEOUS at 11:05

## 2018-01-01 RX ADMIN — CLOPIDOGREL BISULFATE 75 MG: 75 TABLET ORAL at 09:05

## 2018-01-01 RX ADMIN — FENTANYL CITRATE 50 MCG: 50 INJECTION, SOLUTION INTRAMUSCULAR; INTRAVENOUS at 02:05

## 2018-01-01 RX ADMIN — MOXIFLOXACIN HYDROCHLORIDE 400 MG: 400 TABLET, FILM COATED ORAL at 09:05

## 2018-01-01 RX ADMIN — SODIUM CHLORIDE 500 ML: 0.9 INJECTION, SOLUTION INTRAVENOUS at 11:04

## 2018-01-01 RX ADMIN — CLINDAMYCIN IN 5 PERCENT DEXTROSE 600 MG: 12 INJECTION, SOLUTION INTRAVENOUS at 05:04

## 2018-01-01 RX ADMIN — INSULIN ASPART 1 UNITS: 100 INJECTION, SOLUTION INTRAVENOUS; SUBCUTANEOUS at 12:05

## 2018-01-01 RX ADMIN — PROPOFOL 40 MG: 10 INJECTION, EMULSION INTRAVENOUS at 03:05

## 2018-01-01 RX ADMIN — ASPIRIN 325 MG: 325 TABLET, DELAYED RELEASE ORAL at 08:04

## 2018-01-01 RX ADMIN — FAMOTIDINE 20 MG: 10 INJECTION INTRAVENOUS at 09:05

## 2018-01-01 RX ADMIN — INSULIN ASPART 2 UNITS: 100 INJECTION, SOLUTION INTRAVENOUS; SUBCUTANEOUS at 06:04

## 2018-01-01 RX ADMIN — Medication 3 ML: at 10:05

## 2018-01-01 RX ADMIN — DEXTROSE 40 MG: 50 INJECTION, SOLUTION INTRAVENOUS at 10:04

## 2018-01-01 RX ADMIN — ATORVASTATIN CALCIUM 80 MG: 40 TABLET, FILM COATED ORAL at 08:05

## 2018-01-01 RX ADMIN — DEXTROSE 40 MG: 50 INJECTION, SOLUTION INTRAVENOUS at 08:04

## 2018-01-01 RX ADMIN — Medication 3 ML: at 06:05

## 2018-01-01 RX ADMIN — ENOXAPARIN SODIUM 30 MG: 100 INJECTION SUBCUTANEOUS at 06:04

## 2018-01-01 RX ADMIN — LORAZEPAM 2 MG: 2 INJECTION, SOLUTION INTRAMUSCULAR; INTRAVENOUS at 10:05

## 2018-01-01 RX ADMIN — INSULIN DETEMIR 5 UNITS: 100 INJECTION, SOLUTION SUBCUTANEOUS at 09:05

## 2018-01-01 RX ADMIN — INSULIN ASPART 4 UNITS: 100 INJECTION, SOLUTION INTRAVENOUS; SUBCUTANEOUS at 05:05

## 2018-01-01 RX ADMIN — PROPOFOL 100 MG: 10 INJECTION, EMULSION INTRAVENOUS at 11:04

## 2018-01-01 RX ADMIN — SODIUM CHLORIDE 1000 ML: 0.9 INJECTION, SOLUTION INTRAVENOUS at 10:04

## 2018-01-01 RX ADMIN — INSULIN DETEMIR 25 UNITS: 100 INJECTION, SOLUTION SUBCUTANEOUS at 08:05

## 2018-01-01 RX ADMIN — Medication 3 ML: at 03:05

## 2018-01-01 RX ADMIN — ATORVASTATIN CALCIUM 80 MG: 40 TABLET, FILM COATED ORAL at 08:04

## 2018-01-01 RX ADMIN — INSULIN ASPART 8 UNITS: 100 INJECTION, SOLUTION INTRAVENOUS; SUBCUTANEOUS at 11:05

## 2018-01-01 RX ADMIN — ASPIRIN 325 MG ORAL TABLET 325 MG: 325 PILL ORAL at 09:05

## 2018-01-01 RX ADMIN — ENOXAPARIN SODIUM 30 MG: 100 INJECTION SUBCUTANEOUS at 05:04

## 2018-01-01 RX ADMIN — PIPERACILLIN AND TAZOBACTAM 4.5 G: 4; .5 INJECTION, POWDER, LYOPHILIZED, FOR SOLUTION INTRAVENOUS; PARENTERAL at 08:05

## 2018-01-01 RX ADMIN — HEPARIN SODIUM 5000 UNITS: 5000 INJECTION, SOLUTION INTRAVENOUS; SUBCUTANEOUS at 01:05

## 2018-01-01 RX ADMIN — CLINDAMYCIN IN 5 PERCENT DEXTROSE 600 MG: 12 INJECTION, SOLUTION INTRAVENOUS at 01:05

## 2018-01-01 RX ADMIN — LORAZEPAM 2 MG: 2 INJECTION, SOLUTION INTRAMUSCULAR; INTRAVENOUS at 06:05

## 2018-01-01 RX ADMIN — ASPIRIN 325 MG ORAL TABLET 325 MG: 325 PILL ORAL at 10:05

## 2018-01-01 RX ADMIN — HEPARIN SODIUM 5000 UNITS: 5000 INJECTION, SOLUTION INTRAVENOUS; SUBCUTANEOUS at 06:05

## 2018-01-01 RX ADMIN — FUROSEMIDE 60 MG: 10 INJECTION, SOLUTION INTRAMUSCULAR; INTRAVENOUS at 08:05

## 2018-01-01 RX ADMIN — Medication 3 ML: at 05:05

## 2018-01-01 RX ADMIN — CLINDAMYCIN IN 5 PERCENT DEXTROSE 600 MG: 12 INJECTION, SOLUTION INTRAVENOUS at 02:05

## 2018-01-01 RX ADMIN — CLINDAMYCIN IN 5 PERCENT DEXTROSE 600 MG: 12 INJECTION, SOLUTION INTRAVENOUS at 10:05

## 2018-01-01 RX ADMIN — DOCUSATE SODIUM AND SENNOSIDES 1 TABLET: 8.6; 5 TABLET, FILM COATED ORAL at 08:04

## 2018-01-01 RX ADMIN — HEPARIN SODIUM 5000 UNITS: 5000 INJECTION, SOLUTION INTRAVENOUS; SUBCUTANEOUS at 10:05

## 2018-01-01 RX ADMIN — Medication 3 ML: at 02:04

## 2018-01-01 RX ADMIN — INSULIN ASPART 6 UNITS: 100 INJECTION, SOLUTION INTRAVENOUS; SUBCUTANEOUS at 11:05

## 2018-01-01 RX ADMIN — INSULIN DETEMIR 5 UNITS: 100 INJECTION, SOLUTION SUBCUTANEOUS at 10:05

## 2018-01-01 RX ADMIN — CLOPIDOGREL BISULFATE 75 MG: 75 TABLET ORAL at 08:04

## 2018-01-01 RX ADMIN — MOXIFLOXACIN HYDROCHLORIDE 400 MG: 400 TABLET, FILM COATED ORAL at 11:05

## 2018-01-01 RX ADMIN — MORPHINE SULFATE 2 MG: 10 INJECTION INTRAVENOUS at 02:05

## 2018-01-01 RX ADMIN — LORAZEPAM 2 MG: 2 INJECTION, SOLUTION INTRAMUSCULAR; INTRAVENOUS at 02:05

## 2018-01-01 RX ADMIN — IPRATROPIUM BROMIDE AND ALBUTEROL SULFATE 3 ML: .5; 2.5 SOLUTION RESPIRATORY (INHALATION) at 08:05

## 2018-01-01 RX ADMIN — IPRATROPIUM BROMIDE AND ALBUTEROL SULFATE 3 ML: .5; 2.5 SOLUTION RESPIRATORY (INHALATION) at 05:04

## 2018-01-01 RX ADMIN — LORAZEPAM 2 MG: 2 INJECTION, SOLUTION INTRAMUSCULAR; INTRAVENOUS at 03:05

## 2018-01-01 RX ADMIN — FENTANYL CITRATE 50 MCG: 50 INJECTION, SOLUTION INTRAMUSCULAR; INTRAVENOUS at 08:05

## 2018-01-01 RX ADMIN — ACETAMINOPHEN 1000 MG: 10 INJECTION, SOLUTION INTRAVENOUS at 06:04

## 2018-01-01 RX ADMIN — FENTANYL CITRATE 50 MCG: 50 INJECTION, SOLUTION INTRAMUSCULAR; INTRAVENOUS at 12:05

## 2018-01-01 RX ADMIN — IPRATROPIUM BROMIDE AND ALBUTEROL SULFATE 3 ML: .5; 2.5 SOLUTION RESPIRATORY (INHALATION) at 08:04

## 2018-01-01 RX ADMIN — ASPIRIN 300 MG: 300 SUPPOSITORY RECTAL at 10:05

## 2018-01-01 RX ADMIN — ASPIRIN 325 MG ORAL TABLET 325 MG: 325 PILL ORAL at 08:05

## 2018-01-01 RX ADMIN — POLYETHYLENE GLYCOL 3350 17 G: 17 POWDER, FOR SOLUTION ORAL at 10:05

## 2018-01-01 RX ADMIN — INSULIN ASPART 2 UNITS: 100 INJECTION, SOLUTION INTRAVENOUS; SUBCUTANEOUS at 06:05

## 2018-01-01 RX ADMIN — DEXTROSE MONOHYDRATE 12.5 G: 500 INJECTION PARENTERAL at 06:05

## 2018-01-01 RX ADMIN — SODIUM CHLORIDE 1000 ML: 0.45 INJECTION, SOLUTION INTRAVENOUS at 11:04

## 2018-01-01 RX ADMIN — ATORVASTATIN CALCIUM 80 MG: 40 TABLET, FILM COATED ORAL at 10:05

## 2018-01-01 RX ADMIN — CLINDAMYCIN IN 5 PERCENT DEXTROSE 600 MG: 12 INJECTION, SOLUTION INTRAVENOUS at 09:04

## 2018-01-01 RX ADMIN — FENTANYL CITRATE 50 MCG: 50 INJECTION, SOLUTION INTRAMUSCULAR; INTRAVENOUS at 07:05

## 2018-01-01 RX ADMIN — PROPOFOL 10 MCG/KG/MIN: 10 INJECTION, EMULSION INTRAVENOUS at 12:05

## 2018-01-01 RX ADMIN — Medication 10 ML: at 05:05

## 2018-01-01 RX ADMIN — INSULIN ASPART 3 UNITS: 100 INJECTION, SOLUTION INTRAVENOUS; SUBCUTANEOUS at 11:05

## 2018-01-01 RX ADMIN — FENTANYL CITRATE 100 MCG: 50 INJECTION, SOLUTION INTRAMUSCULAR; INTRAVENOUS at 11:05

## 2018-01-01 RX ADMIN — DOCUSATE SODIUM AND SENNOSIDES 1 TABLET: 8.6; 5 TABLET, FILM COATED ORAL at 10:05

## 2018-01-01 RX ADMIN — INSULIN ASPART 4 UNITS: 100 INJECTION, SOLUTION INTRAVENOUS; SUBCUTANEOUS at 06:05

## 2018-01-01 RX ADMIN — DEXTROSE 40 MG: 50 INJECTION, SOLUTION INTRAVENOUS at 09:04

## 2018-01-01 RX ADMIN — HEPARIN SODIUM 5000 UNITS: 5000 INJECTION, SOLUTION INTRAVENOUS; SUBCUTANEOUS at 05:05

## 2018-01-01 RX ADMIN — IPRATROPIUM BROMIDE AND ALBUTEROL SULFATE 3 ML: .5; 2.5 SOLUTION RESPIRATORY (INHALATION) at 11:04

## 2018-01-01 RX ADMIN — INSULIN ASPART 1 UNITS: 100 INJECTION, SOLUTION INTRAVENOUS; SUBCUTANEOUS at 11:05

## 2018-01-01 RX ADMIN — PIPERACILLIN AND TAZOBACTAM 4.5 G: 4; .5 INJECTION, POWDER, LYOPHILIZED, FOR SOLUTION INTRAVENOUS; PARENTERAL at 12:05

## 2018-01-01 RX ADMIN — Medication 10 ML: at 10:05

## 2018-01-01 RX ADMIN — INSULIN ASPART 6 UNITS: 100 INJECTION, SOLUTION INTRAVENOUS; SUBCUTANEOUS at 05:05

## 2018-01-01 RX ADMIN — GLYCOPYRROLATE 0.1 MG: 0.2 INJECTION INTRAMUSCULAR; INTRAVENOUS at 02:05

## 2018-01-01 RX ADMIN — CLOPIDOGREL BISULFATE 75 MG: 75 TABLET ORAL at 10:05

## 2018-01-01 RX ADMIN — POLYETHYLENE GLYCOL 3350 17 G: 17 POWDER, FOR SOLUTION ORAL at 08:04

## 2018-01-01 RX ADMIN — INSULIN ASPART 8 UNITS: 100 INJECTION, SOLUTION INTRAVENOUS; SUBCUTANEOUS at 12:05

## 2018-01-01 RX ADMIN — DEXTROSE 40 MG: 50 INJECTION, SOLUTION INTRAVENOUS at 11:04

## 2018-01-01 RX ADMIN — ASPIRIN 300 MG: 300 SUPPOSITORY RECTAL at 09:05

## 2018-01-01 RX ADMIN — INSULIN ASPART 1 UNITS: 100 INJECTION, SOLUTION INTRAVENOUS; SUBCUTANEOUS at 11:04

## 2018-01-01 RX ADMIN — LORAZEPAM 2 MG: 2 INJECTION, SOLUTION INTRAMUSCULAR; INTRAVENOUS at 07:05

## 2018-01-01 RX ADMIN — Medication 3 ML: at 07:04

## 2018-01-01 RX ADMIN — ONDANSETRON 8 MG: 8 TABLET, ORALLY DISINTEGRATING ORAL at 09:04

## 2018-01-01 RX ADMIN — Medication 10 ML: at 06:05

## 2018-01-01 RX ADMIN — CEFTRIAXONE SODIUM 1 G: 1 INJECTION, POWDER, FOR SOLUTION INTRAMUSCULAR; INTRAVENOUS at 07:05

## 2018-01-01 RX ADMIN — INSULIN ASPART 6 UNITS: 100 INJECTION, SOLUTION INTRAVENOUS; SUBCUTANEOUS at 06:05

## 2018-01-01 RX ADMIN — ACETAMINOPHEN 1000 MG: 10 INJECTION, SOLUTION INTRAVENOUS at 05:04

## 2018-01-01 RX ADMIN — INSULIN ASPART 2 UNITS: 100 INJECTION, SOLUTION INTRAVENOUS; SUBCUTANEOUS at 05:04

## 2018-01-01 RX ADMIN — PIPERACILLIN AND TAZOBACTAM 4.5 G: 4; .5 INJECTION, POWDER, LYOPHILIZED, FOR SOLUTION INTRAVENOUS; PARENTERAL at 03:05

## 2018-01-01 RX ADMIN — LORAZEPAM 2 MG: 2 INJECTION, SOLUTION INTRAMUSCULAR; INTRAVENOUS at 05:05

## 2018-01-01 RX ADMIN — HEPARIN SODIUM 5000 UNITS: 5000 INJECTION, SOLUTION INTRAVENOUS; SUBCUTANEOUS at 02:05

## 2018-01-01 RX ADMIN — INSULIN DETEMIR 15 UNITS: 100 INJECTION, SOLUTION SUBCUTANEOUS at 09:04

## 2018-01-01 RX ADMIN — FENTANYL CITRATE 50 MCG: 50 INJECTION, SOLUTION INTRAMUSCULAR; INTRAVENOUS at 10:05

## 2018-01-01 RX ADMIN — ASPIRIN 300 MG: 600 SUPPOSITORY RECTAL at 10:04

## 2018-01-01 RX ADMIN — INSULIN DETEMIR 25 UNITS: 100 INJECTION, SOLUTION SUBCUTANEOUS at 09:05

## 2018-01-01 RX ADMIN — Medication 25 MCG/HR: at 01:05

## 2018-01-01 RX ADMIN — SODIUM CHLORIDE: 0.9 INJECTION, SOLUTION INTRAVENOUS at 08:04

## 2018-01-01 RX ADMIN — IPRATROPIUM BROMIDE AND ALBUTEROL SULFATE 3 ML: .5; 2.5 SOLUTION RESPIRATORY (INHALATION) at 11:05

## 2018-01-01 RX ADMIN — IPRATROPIUM BROMIDE AND ALBUTEROL SULFATE 3 ML: .5; 2.5 SOLUTION RESPIRATORY (INHALATION) at 03:05

## 2018-01-01 RX ADMIN — FENTANYL CITRATE 50 MCG: 50 INJECTION, SOLUTION INTRAMUSCULAR; INTRAVENOUS at 05:05

## 2018-01-01 RX ADMIN — LABETALOL HYDROCHLORIDE 10 MG: 5 INJECTION, SOLUTION INTRAVENOUS at 02:05

## 2018-01-01 RX ADMIN — FAMOTIDINE 20 MG: 10 INJECTION INTRAVENOUS at 10:05

## 2018-01-01 RX ADMIN — MORPHINE SULFATE 2 MG: 10 INJECTION INTRAVENOUS at 07:05

## 2018-01-01 RX ADMIN — SODIUM CHLORIDE 1000 ML: 0.9 INJECTION, SOLUTION INTRAVENOUS at 12:05

## 2018-01-01 RX ADMIN — SODIUM CHLORIDE: 0.9 INJECTION, SOLUTION INTRAVENOUS at 06:04

## 2018-01-01 RX ADMIN — PROPOFOL 5 MCG/KG/MIN: 10 INJECTION, EMULSION INTRAVENOUS at 02:05

## 2018-01-01 RX ADMIN — Medication 3 ML: at 01:05

## 2018-01-01 RX ADMIN — FENTANYL CITRATE 50 MCG: 50 INJECTION, SOLUTION INTRAMUSCULAR; INTRAVENOUS at 01:05

## 2018-01-01 RX ADMIN — CEFTRIAXONE SODIUM 1 G: 1 INJECTION, POWDER, FOR SOLUTION INTRAMUSCULAR; INTRAVENOUS at 04:05

## 2018-01-01 RX ADMIN — FUROSEMIDE 40 MG: 10 INJECTION, SOLUTION INTRAMUSCULAR; INTRAVENOUS at 10:05

## 2018-01-01 RX ADMIN — CLINDAMYCIN IN 5 PERCENT DEXTROSE 600 MG: 12 INJECTION, SOLUTION INTRAVENOUS at 07:05

## 2018-04-26 NOTE — LETTER
May 3, 2018                      [place] [address]  Phone: [phone] TO WHOM IT MAY CONCERN    REGARDING VISA FOR Jacky Michaels  Date of birth 6/2/2002    The grandfather of Jacky Michaels is iJgar Michaels date of birth 1955. Please assist Jacky Michaels to obtain a Visa so that she can come to this hospital to be at the bedside of Mr Jigar Michaels who is in the intensive care unit in critical condition. The patient has suffered a series of strokes everely incapacitating the patient. The patient has additional complications that include respiratory and cardiac failure. The patient is now mechanical ventilator dependent.    Thank your for your consideration and assistance for the grand daughter to be at the bedside of patient.      [facility] [place]   [place]  [phone]      Yousif Wylie MD  Neurology   [phone]

## 2018-04-26 NOTE — LETTER
May 2, 2018                      [place] [address]  Phone: [phone] TO: [employer]         [employer]      RE:  Passenger Thao Michaels  1984    Please assist passenger Annabelle Michaels in any way possible to rearrange her flight schedule. Her father Jigar Michaels is in the Intensive Care Unit of this hospital in critical condition. Ms Michaels is needed at bedside to meet with the doctors daily and assist with decision making for her father.     Thank you very much    [facility] [place]   [place]  [phone]    Lul Wylie MD  Neurology  [phone]

## 2018-04-26 NOTE — LETTER
May 4, 2018    Jigar Michaels  [address] [facility] [address] [address] [facility] [address]  Phone: [phone] TO WHOM IT MAY CONCERN    REGARDING VISA FOR Jacky Michaels  Date of birth 6/2/2002    The grandfather of Jacky Michaels is Jigar Michaels date of birth 1955. Please assist Jacky Michaels to obtain a Visa so that she can come to this hospital to be at the bedside of Mr Jigar Michaels who is in the intensive care unit in critical condition. The patient has suffered a series of strokes severely incapacitating the patient. The patient has additional complications that include respiratory and cardiac failure. The patient is now mechanical ventilator dependent.    Thank your for your consideration and assistance for the grand daughter to be at the bedside of patient.      [facility] Lu[facility] [place]   II  [phone]      Yousif Wylie MD  Neurology   [phone]

## 2018-04-26 NOTE — LETTER
May 4, 2018    Jigar Michaels  [address] [facility] [address] [address] [facility] [address]  Phone: [phone] TO WHOM IT MAY CONCERN    REGARDING VISA FOR CIERRA MICHAELS  Date of birth 1/8/1973    The father of Mr Cierra Michaels is Jigar Michaels date of birth 1955. Please assist Mr Cierra Michaels to obtain a Visa so that he can come to this hospital to be at the bedside of Mr Jigar Michaels who is in the intensive care unit in critical condition. The patient has suffered a series of strokes everely incapacitating the patient. The patient has additional complications that include respiratory and cardiac failure. The patient is now mechanical ventilator dependent.    Thank your for your consideration and assistance for the son to be at the bedside of patient.      [facility] Lu[facility] [place]   II  [phone]      Yousif Wylie MD  Neurology   [phone]

## 2018-04-26 NOTE — LETTER
May 3, 2018                      [place] [address]  Phone: [phone] TO WHOM IT MAY CONCERN    REGARDING VISA FOR ANA ELIZALDE  Date of birth 1/8/1973    The father of Mr Ana Elizalde is Jigar Elizalde date of birth 1955. Please assist Mr Ana Elizalde to obtain a Visa so that he can come to this hospital to be at the bedside of Mr Jigar Elizalde who is in the intensive care unit in critical condition. The patient has suffered a series of strokes everely incapacitating the patient. The patient has additional complications that include respiratory and cardiac failure. The patient is now mechanical ventilator dependent.    Thank your for your consideration and assistance for the son to be at the bedside of patient.      [facility] [place]   II  [phone]      Yousif Wylie MD  Neurology   [phone]

## 2018-04-26 NOTE — LETTER
May 4, 2018    Jigar Michaels  [address] [facility] [address] [address] [facility] [address]  Phone: [phone] TO WHOM IT MAY CONCERN    REGARDING VISA FOR CIERRA MICHAELS  Date of birth 1/8/1973    The father of Mr Cierra Michaels is Jigar Michaels date of birth 1955. Please assist Mr Cierra Michaels to obtain a Visa so that he can come to this hospital to be at the bedside of Mr Jigar Michaels who is in the intensive care unit in critical condition. The patient has suffered a series of strokes severely incapacitating the patient. The patient has additional complications that include respiratory and cardiac failure. The patient is now mechanical ventilator dependent.    Thank your for your consideration and assistance for the son to be at the bedside of patient.      [facility] [place]   [place]  [phone]      Yousif Wylie MD  Neurology   [phone]

## 2018-04-27 PROBLEM — E11.65 HYPERGLYCEMIA DUE TO TYPE 2 DIABETES MELLITUS: Status: ACTIVE | Noted: 2018-01-01

## 2018-04-27 PROBLEM — N17.9 ACUTE RENAL FAILURE: Status: ACTIVE | Noted: 2018-01-01

## 2018-04-27 PROBLEM — I63.9 ACUTE CVA (CEREBROVASCULAR ACCIDENT): Status: ACTIVE | Noted: 2018-01-01

## 2018-04-27 PROBLEM — I50.41 ACUTE COMBINED SYSTOLIC AND DIASTOLIC (CONGESTIVE) HRT FAIL: Status: ACTIVE | Noted: 2018-01-01

## 2018-04-27 PROBLEM — E87.1 HYPONATREMIA: Status: ACTIVE | Noted: 2018-01-01

## 2018-04-27 NOTE — PT/OT/SLP EVAL
Physical Therapy Evaluation    Patient Name:  Jigar Michaels   MRN:  [id number]    Recommendations:     Discharge Recommendations:  rehabilitation facility   Discharge Equipment Recommendations:  (TBD)   Barriers to discharge: Pt with decreased mobility.    Assessment:     Jigar Michaels is a 62 y.o. male admitted with a medical diagnosis of Acute CVA (cerebrovascular accident).  He presents with the following impairments/functional limitations:  weakness, impaired endurance, impaired self care skills, impaired functional mobilty, gait instability, impaired balance, decreased coordination, decreased upper extremity function, decreased lower extremity function, decreased safety awareness, abnormal tone, decreased ROM, impaired coordination, impaired fine motor.    Rehab Prognosis:  Fair+; patient would benefit from acute skilled PT services to address these deficits and reach maximum level of function.      Recent Surgery: * No surgery found *      Plan:     During this hospitalization, patient to be seen daily to address the above listed problems via gait training, therapeutic activities, therapeutic exercises  · Plan of Care Expires:  05/11/18   Plan of Care Reviewed with: patient, spouse    Subjective     Patient found in bed upon PT entry to room, agreeable to evaluation.      Chief Complaint: Pt reported difficulty walking.   Patient comments/goals: to walk  Pain/Comfort:  · Pain Rating 1: 0/10    Living Environment:  Pt reported living with spouse and 2 sons in a 2 story house.  Pt's bedroom/bathroom are on the 1st floor.  Prior to admission, patients level of function was mod I with ambulation using str cane PRN.  Pt was driving.  Patient has the following equipment: cane, straight, rollator, shower chair. Upon discharge, patient will have assistance from family.    Objective:     Patient found with: peripheral IV, telemetry     General Precautions: Standard, fall, diabetic   Orthopedic Precautions:N/A   Braces: N/A      Exams:  · Cognitive Exam:  Patient able to follow simple commands.  Pt with dysarthria.     · Gross Motor Coordination:  impaired  · Postural Exam:  Patient presented with the following abnormalities:    · -       Rounded shoulders  · Sensation:    · -       Intact  light/touch BLE  · Skin Integrity/Edema:      · -       Skin integrity: Visible skin intact  · -       Edema: None noted BLE  · RLE ROM: WNL; Pt has a small protruding soft tissue on dorsal aspect of R foot.    · RLE Strength: WNL  · LLE ROM: WFL except decreased ankle DF  · LLE Strength: hip flex and knee ext 3+/5, ankle DF0/5    Functional Mobility:  · Bed Mobility:     · Scooting: moderate assistance to scoot EOB  · Supine to Sit: minimum assistance  · Sit to Supine: contact guard assistance  · Transfers:     · Sit to Stand:  minimum assistance with quad cane  · Gait: Pt ambulated ~5-6 sidesteps with SBQC/mod-min A.  Pt with decreased LLE advancement, required min A.  Pt with decreased weight shifting, L foot clearance, and decreased step length.   · Balance: Pt with fair balance.     AM-PAC 6 CLICK MOBILITY  Total Score:15       Therapeutic Activities and Exercises:  PROM LLE in all available planes    Patient left with bed in chair position with all lines intact, call button in reach, bed alarm on and spouse present.    GOALS:    Physical Therapy Goals        Problem: Physical Therapy Goal    Goal Priority Disciplines Outcome Goal Variances Interventions   Physical Therapy Goal     PT/OT, PT      Description:  Goals to be met by: 18     Patient will increase functional independence with mobility by performin. Supine to sit with Stand-by Assistance  2. Rolling to Left and Right with Stand-by Assistance  3. Sit to stand transfer with Stand-by Assistance using QC  4. Bed to chair transfer with Stand-by Assistance using Quad Cane  5. Gait  x 50 feet with Stand-by Assistance using Quad Cane   6. Lower extremity exercise program x 20 reps  per handout, with assistance as needed                      History:     Past Medical History:   Diagnosis Date    Diabetes mellitus     Hypertension     Stroke        History reviewed. No pertinent surgical history.    Clinical Decision Making:     History  Co-morbidities and personal factors that may impact the plan of care Examination  Body Structures and Functions, activity limitations and participation restrictions that may impact the plan of care Clinical Presentation   Decision Making/ Complexity Score   Co-morbidities:   [] Time since onset of injury / illness / exacerbation  [x] Status of current condition  [x]Patient's cognitive status and safety concerns    [] Multiple Medical Problems (see med hx)  Personal Factors:   [] Patient's age  [] Prior Level of function   [x] Patient's home situation (environment and family support)  [] Patient's level of motivation  [] Expected progression of patient      HISTORY:(criteria)    [] 39386 - no personal factors/history    [] 98198 - has 1-2 personal factor/comorbidity     [x] 23215 - has >3 personal factor/comorbidity     Body Regions:  [] Objective examination findings  [] Head     []  Neck  [] Trunk   [x] Upper Extremity  [x] Lower Extremity    Body Systems:  [x] For communication ability, affect, cognition, language, and learning style: the assessment of the ability to make needs known, consciousness, orientation (person, place, and time), expected emotional /behavioral responses, and learning preferences (eg, learning barriers, education  needs)  [x] For the neuromuscular system: a general assessment of gross coordinated movement (eg, balance, gait, locomotion, transfers, and transitions) and motor function  (motor control and motor learning)  [x] For the musculoskeletal system: the assessment of gross symmetry, gross range of motion, gross strength, height, and weight  [] For the integumentary system: the assessment of pliability(texture), presence of scar  formation, skin color, and skin integrity  [x] For cardiovascular/pulmonary system: the assessment of heart rate, respiratory rate, blood pressure, and edema     Activity limitations:    [x] Patient's cognitive status and saf ety concerns          [x] Status of current condition      [] Weight bearing restriction  [] Cardiopulmunary Restriction    Participation Restrictions:   [] Goals and goal agreement with the patient     [] Rehab potential (prognosis) and probable outcome      Examination of Body System: (criteria)    [] 05760 - addressing 1-2 elements    [] 16296 - addressing a total of 3 or more elements     [x] 77893 -  Addressing a total of 4 or more elements         Clinical Presentation: (criteria)  Evolving - 17673     On examination of body system using standardized tests and measures patient presents with 4 or more elements from any of the following: body structures and functions, activity limitations, and/or participation restrictions.  Leading to a clinical presentation that is considered evolving with changing characteristics                              Clinical Decision Making  (Eval Complexity):  Moderate - 21466     Time Tracking:     PT Received On: 04/27/18  PT Start Time: 1333     PT Stop Time: 1401  PT Total Time (min): 28 min     Billable Minutes: Evaluation 28 min      Stephanie Resendez, PT  04/27/2018

## 2018-04-27 NOTE — CONSULTS
[facility]  Neurology  Consult Note    Patient Name: Jigar Michaels  MRN: [id number]  Admission Date: 4/26/2018  Hospital Length of Stay: 1 days  Code Status: Full Code   Attending Provider: Dorina Summers MD   Consulting Provider: Lul Wylie MD  Primary Care Physician: Hannah Andino MD  Principal Problem:Acute CVA (cerebrovascular accident)    Consults  Subjective:     Chief Complaint:  Stroke    HPI: 61 y/o male with medical Hx as listed below comes to ED for weakness on left side. Pt has Hx of stroke with residual left sided weakness but family reports worsening of motor deficits. His wife states that he is able to walk and feed himself at baseline but his speech is slurred. He is adherent to medications according to wife. Pt has already had at least two strokes.    Past Medical History:   Diagnosis Date    Diabetes mellitus     Hypertension     Stroke        History reviewed. No pertinent surgical history.    Review of patient's allergies indicates:  No Known Allergies    Current Neurological Medications:     No current facility-administered medications on file prior to encounter.      Current Outpatient Prescriptions on File Prior to Encounter   Medication Sig    aspirin (ECOTRIN) 325 MG EC tablet Take 1 tablet (325 mg total) by mouth once daily.    insulin aspart protamine-insulin aspart (NOVOLOG 70/30) 100 unit/mL (70-30) InPn Inject 30 Units into the skin 2 (two) times daily before meals.      telmisartan (MICARDIS) 40 MG Tab Take 40 mg by mouth once daily.      guaifenesin-codeine 100-10 mg/5 ml (TUSSI-ORGANIDIN NR)  mg/5 mL syrup Take 10 mLs by mouth every 6 (six) hours as needed for Cough or Congestion.      Family History     None        Social History Main Topics    Smoking status: Never Smoker    Smokeless tobacco: Not on file    Alcohol use No    Drug use: No    Sexual activity: Yes     Review of Systems   Constitutional: Negative for fever.   HENT: Negative for trouble  swallowing.    Eyes: Negative for photophobia.   Respiratory: Negative for stridor.    Cardiovascular: Negative for palpitations.   Gastrointestinal: Negative for abdominal pain.   Genitourinary: Negative for dysuria.   Musculoskeletal: Negative for back pain.   Neurological: Negative for headaches.   Psychiatric/Behavioral: Negative for confusion.          Objective:     Vital Signs (Most Recent):  Temp: 98.2 °F (36.8 °C) (04/27/18 0740)  Pulse: 87 (04/27/18 0740)  Resp: 16 (04/27/18 0740)  BP: (!) 157/76 (04/27/18 0740)  SpO2: 98 % (04/27/18 0740) Vital Signs (24h Range):  Temp:  [98 °F (36.7 °C)-98.5 °F (36.9 °C)] 98.2 °F (36.8 °C)  Pulse:  [] 87  Resp:  [15-22] 16  SpO2:  [93 %-99 %] 98 %  BP: (157-173)/(76-89) 157/76     Weight: 65.2 kg (143 lb 11.8 oz)  Body mass index is 23.92 kg/m².    Physical Exam   Constitutional: He is oriented to person, place, and time. No distress.   HENT:   Head: Normocephalic.   Eyes: Right eye exhibits no discharge. Left eye exhibits no discharge.   Neck: Normal range of motion.   Cardiovascular: Normal rate and regular rhythm.    Pulmonary/Chest: Effort normal and breath sounds normal.   Abdominal: Soft. Bowel sounds are normal.   Musculoskeletal: He exhibits no edema.   Neurological: He is oriented to person, place, and time.   Skin: He is not diaphoretic.   Psychiatric: His speech is slurred.       NEUROLOGICAL EXAMINATION:     MENTAL STATUS   Oriented to person, place, and time.   Speech: slurred   Level of consciousness: alert    CRANIAL NERVES     CN III, IV, VI   Right pupil: Size: 2 mm. Shape: regular.   Left pupil: Size: 2 mm. Shape: regular.   Nystagmus: none   Ophthalmoparesis: none    CN V   Right facial sensation deficit: none  Left facial sensation deficit: none    CN VII   Right facial weakness: none  Left facial weakness: central    CN IX, X   Palate: asymmetric    CN XII   Tongue deviation: left    MOTOR EXAM        increased tone on LUE       Significant  Labs:   CBC:   Recent Labs  Lab 04/26/18 2035   WBC 6.56   HGB 14.8   HCT 42.7        CMP:   Recent Labs  Lab 04/26/18 2035   *      K 4.2      CO2 25   BUN 23   CREATININE 2.2*   CALCIUM 9.9   MG 2.4   PROT 7.7   ALBUMIN 3.8   BILITOT 0.4   ALKPHOS 78   AST 20   ALT 27   ANIONGAP 9   EGFRNONAA 31*       Significant Imaging:     MRI brain  1. Acute lacunar-type infarctions involving the superior right terrence and in the left cerebral peduncle as above.  2. Generalized cerebral and cerebellar atrophy.  3. Deep white matter changes in the cerebral hemispheres bilaterally most likely from chronic microvascular ischemia.      Electronically signed by: Tashi Srinivasan MD  Date: 04/27/2018  Time: 11:13    Assessment and Plan:     61 y/o male with acute stroke    1. Stroke: acute areas of infarction on posterior circulation. With his multiple previous strokes and now to more areas this could be embolism. No hx of atrial fibrillation but new HF.    -CTA head.   -ASA and statin for now.   -PT/OT/ST: inpatient rehab.    Active Diagnoses:    Diagnosis Date Noted POA    PRINCIPAL PROBLEM:  Acute CVA (cerebrovascular accident) [I63.9] 04/27/2018 Yes    Hyperglycemia due to type 2 diabetes mellitus [E11.65] 04/27/2018 Yes    Acute renal failure [N17.9] 04/27/2018 Yes    Hypertension [I10] 12/09/2012 Yes    Diabetes mellitus type 2, controlled [E11.9] 12/09/2012 Yes    CVA, old, hemiparesis [I69.359] 12/09/2012 Not Applicable    Dysphagia [R13.10] 12/08/2012 Yes      Problems Resolved During this Admission:    Diagnosis Date Noted Date Resolved POA       VTE Risk Mitigation         Ordered     IP VTE LOW RISK PATIENT  Once      04/27/18 0248     Place sequential compression device  Until discontinued      04/27/18 0248          Thank you for your consult. I will follow-up with patient. Please contact us if you have any additional questions.    Lul Wylie MD  Neurology  [facility]

## 2018-04-27 NOTE — CONSULTS
Inpatient consult to Cardiology  Consult performed by: LIZETTE NOEL  Consult ordered by: TOM TOLENTINO  Reason for consult: Newly diagnosed cardiomyopathy        Subjective:     History of Present Illness:  62 y.o. male with HTN, DM and Hx of CVA (x3 per pt) presents to the ED via EMS c/o generalized weakness and difficulty clearing sputum from his throat (causing frequent cough) for the past week as well as slurred speech since this morning. Pt reports Hx of L sided weakness from his prior strokes, but denies any Hx of speech changes. He denies any numbness in his face or extremities. He denies fever, chest pain, SOB, abdominal pain, N/V.    Unable to obtain full adequate history.  Most is obtained per the medical record due to speech impediment with recent acute on chronic stroke and no family at bedside.  He seems to appropriately answer some questions and denies any current chest pain, shortness of breath or palpitations.  He he signaled no to any previous cardiac issues or any recent testing.  He was able to verbalize and asked how bad his heart was functioning.  He had a echocardiogram performed today which shows newly diagnosed cardiomyopathy in comparison to remote echo done in 2012.  He is resting comfortably in bed.        Subjective:     Patient information was obtained from past medical records.    Past Medical History:   Past Medical History:   Diagnosis Date    Diabetes mellitus     Hypertension     Stroke        Past Surgical History: History reviewed. No pertinent surgical history.    Family History: History reviewed. No pertinent family history.     Social History:   Social History   Substance Use Topics    Smoking status: Never Smoker    Smokeless tobacco: Not on file    Alcohol use No        Medications:   Current Facility-Administered Medications:     aspirin EC tablet 325 mg, 325 mg, Oral, Daily, Silvano Velasquez MD    atorvastatin tablet 80 mg, 80 mg, Oral, Daily, Silvano PEÑA  MD Eva    clopidogrel tablet 75 mg, 75 mg, Oral, Daily, Silvano Velasquez MD    dextrose 50% injection 12.5 g, 12.5 g, Intravenous, PRN, Silvano Velasquez MD    glucagon (human recombinant) injection 1 mg, 1 mg, Intramuscular, PRN, Silvano Velasquez MD    insulin aspart U-100 pen 1-10 Units, 1-10 Units, Subcutaneous, Q6H PRN, Silvano Velasquez MD, 2 Units at 04/27/18 0546    insulin detemir U-100 pen 15 Units, 15 Units, Subcutaneous, QHS, Dorina Summers MD    labetalol injection 10 mg, 10 mg, Intravenous, Q15 Min PRN, Silvano Velasquez MD    mineral oil enema 1 enema, 1 enema, Rectal, Daily PRN, Silvano Velasquez MD    ondansetron disintegrating tablet 8 mg, 8 mg, Oral, Q8H PRN, Silvano Velasquez MD    pantoprazole 40 mg in dextrose 5 % 100 mL infusion (ready to mix system), 40 mg, Intravenous, Daily, Daphnie Guo MD, 40 mg at 04/27/18 1120    polyethylene glycol packet 17 g, 17 g, Oral, Daily, Silvano Velasquez MD    promethazine suppository 25 mg, 25 mg, Rectal, Q6H PRN, Silvano Velasquez MD    senna-docusate 8.6-50 mg per tablet 1 tablet, 1 tablet, Oral, BID, Silvano Velasquez MD    sodium chloride 0.9% bolus 500 mL, 500 mL, Intravenous, Continuous PRN, Silvano Velasquez MD    sodium chloride 0.9% flush 3 mL, 3 mL, Intravenous, Q8H, Silvano Velasquez MD, 3 mL at 04/27/18 0600    Allergies: Review of patient's allergies indicates:  No Known Allergies    Review of Systems   Unable to perform ROS: acuity of condition     Objective:     Vitals:   Vitals:    04/27/18 1526   BP: (!) 176/81   Pulse: 90   Resp: 16   Temp: 98.7 °F (37.1 °C)        Physical Exam   Constitutional: He appears well-developed and well-nourished. No distress.   HENT:   Head: Normocephalic and atraumatic.   Eyes: Conjunctivae and EOM are normal. Pupils are equal, round, and reactive to light.   Neck: Normal range of motion. Neck supple. No thyromegaly present.   Cardiovascular: Normal rate, regular  rhythm and normal heart sounds.    No murmur heard.  Pulmonary/Chest: Effort normal and breath sounds normal. No respiratory distress. He has no wheezes. He has no rales. He exhibits no tenderness.   Abdominal: Soft. Bowel sounds are normal.   Musculoskeletal: He exhibits no edema.   Neurological: He is alert. A cranial nerve deficit is present. He exhibits abnormal muscle tone. Coordination abnormal.   Skin: Skin is warm and dry.   Psychiatric:   Unable to fully assess       Assessment/Plan:     Impression: Patient is a 62 y.o. 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    Jayson Pringle MD  Cardiology  [facility]

## 2018-04-27 NOTE — H&P
[facility] Hospital Medicine  History & Physical    Patient Name: Jigar Michaels  MRN: [id number]  Admission Date: 04/27/2018  Attending Physician: Silvano Velasquez MD, MPH      PCP:     Hannah Andino MD    CC:     Chief Complaint   Patient presents with    Weakness     generalized weakness x1 week. Pt reports he might have had another stroke but the onset of symptoms were over a week ago       HISTORY OF PRESENT ILLNESS:     Jigar Michaels is a 62 y.o. male that (in part)  has a past medical history of Diabetes mellitus; Hypertension; and Stroke.  Presents to [facility] Emergency Department complaining of generalized weakness.  Subacute onset 1 week ago with progressive worsening.  Last known to be normal several days ago.  Was found to be significantly worse this morning by the patient's family member reports significant worsening of the left upper and lower extremity chronic weakness from a previous stroke.  He is unable to communicate effectively and is coughing, which may be consistent with aspiration.  Denies fever, chills, or recent infection.  No recent hospitalizations.  No report of syncope or collapse.  Significantly decreased oral intake over the last few days.  Patient was refusing to come to the Hospital prior to this.  Unfortunately due to the current condition of the patient remained of the history is limited    In the emergency department routine laboratory studies, CT of the head, and a consultation to tele-neurology were performed.  There is concern for acute CVA.  He is being admitted for stroke workup and neurology consultation.  MRI of the morning.    Hospital medicine has been asked to admit for further evaluation and treatment.       REVIEW OF SYSTEMS:     The available review of system is addressed in the HPI and is otherwise limited due to the condition of the patient.     PAST MEDICAL / SURGICAL HISTORY:     Past Medical History:   Diagnosis Date     Diabetes mellitus     Hypertension     Stroke      History reviewed. No pertinent surgical history.      FAMILY HISTORY:     Unable to obtain family history due to current condition of patient with possible acute stroke.      SOCIAL HISTORY:     Social History     Social History    Marital status:      Spouse name: N/A    Number of children: N/A    Years of education: N/A     Social History Main Topics    Smoking status: Never Smoker    Smokeless tobacco: None    Alcohol use No    Drug use: No    Sexual activity: Yes     Other Topics Concern    None     Social History Narrative    None         ALLERGIES:       Review of patient's allergies indicates:  No Known Allergies      HEALTH SCREENING:     Influenza vaccine \not up-to-date for this season.  Prevnar 13 pneumonia vaccine = no evidence of previous vaccination found in the medical record      HOME MEDICATIONS:     Prior to Admission medications    Medication Sig Start Date End Date Taking? Authorizing Provider   amLODIPine (NORVASC) 10 MG tablet Take 10 mg by mouth once daily.   Yes Historical Provider, MD   aspirin (ECOTRIN) 325 MG EC tablet Take 1 tablet (325 mg total) by mouth once daily. 12/18/12  Yes Kristin Batista MD   atorvastatin (LIPITOR) 40 MG tablet Take 40 mg by mouth once daily.   Yes Historical Provider, MD   cephALEXin (KEFLEX) 500 MG capsule Take 500 mg by mouth every 6 (six) hours.   Yes Historical Provider, MD   chlorthalidone (HYGROTEN) 25 MG Tab Take 25 mg by mouth once daily.   Yes Historical Provider, MD   clopidogrel (PLAVIX) 75 mg tablet Take 75 mg by mouth once daily.   Yes Historical Provider, MD   doxazosin (CARDURA) 1 MG tablet Take 2 mg by mouth every evening.   Yes Historical Provider, MD   insulin aspart protamine-insulin aspart (NOVOLOG 70/30) 100 unit/mL (70-30) InPn Inject 30 Units into the skin 2 (two) times daily before meals.     Yes Historical Provider, MD   nateglinide (STARLIX) 120 MG tablet Take 120 mg  "by mouth once daily.   Yes Historical Provider, MD   telmisartan (MICARDIS) 40 MG Tab Take 40 mg by mouth once daily.     Yes Historical Provider, MD   guaifenesin-codeine 100-10 mg/5 ml (TUSSI-ORGANIDIN NR)  mg/5 mL syrup Take 10 mLs by mouth every 6 (six) hours as needed for Cough or Congestion. 12/18/12   Kristin Batista MD          [facility] MEDICATIONS:     Scheduled Meds:    aspirin  325 mg Oral Daily    atorvastatin  80 mg Oral Daily    clopidogrel  75 mg Oral Daily    pantoprazole 40 mg in dextrose 5 % 100 mL infusion (ready to mix system)  40 mg Intravenous Daily    polyethylene glycol  17 g Oral Daily    senna-docusate 8.6-50 mg  1 tablet Oral BID    sodium chloride 0.9%  3 mL Intravenous Q8H     Continuous Infusions:    sodium chloride 0.9%      sodium chloride 0.9%       PRN Meds:       PHYSICAL EXAM:     Wt Readings from Last 1 Encounters:   04/27/18 0148 65.2 kg (143 lb 11.8 oz)   04/26/18 2019 68 kg (150 lb)     Body mass index is 23.92 kg/m².  Vitals:    04/26/18 2333 04/27/18 0102 04/27/18 0148 04/27/18 0317   BP: (!) 164/78 (!) 157/79 (!) 161/76 (!) 173/77   BP Location: Left arm Left arm Left arm    Patient Position: Sitting Sitting Sitting Lying   Pulse: 94 108 87 93   Resp:  (!) 22 (!) 21 18   Temp:  98.5 °F (36.9 °C) 98 °F (36.7 °C) 98.2 °F (36.8 °C)   TempSrc:  Oral Oral Oral   SpO2: (!) 94% (!) 93% 96% 95%   Weight:   65.2 kg (143 lb 11.8 oz)    Height:   5' 5" (1.651 m)           -- General appearance: Chronically ill-appearing.  well developed. appears stated age   -- Head: normocephalic, atraumatic   -- Eyes: conjunctivae clear. Extraocular muscles intact  -- Nose: Nares normal. Septum midline.   -- Mouth/Throat: lips, mucosa, and tongue normal. no throat erythema.   -- Neck: supple, symmetrical, trachea midline, no JVD and thyroid not grossly enlarged, appears symmetric  -- Lungs: clear to auscultation bilaterally. normal respiratory effort. No use of accessory muscles.   -- " Chest wall: no tenderness. equal bilateral chest rise   -- Heart: Rapid rate and regular rhythm. S1, S2 normal.  no click, rub or gallop   -- Abdomen: soft, non-tender, non-distended, non-tympanic; bowel sounds normal; no masses  -- Extremities: Left upper lower extremity focal weakness; acute worsening compared to chronic per family.  no cyanosis, clubbing or edema.   -- Pulses: 2+ and symmetric   -- Skin: color normal, texture normal, turgor normal. No rashes or lesions.   -- Neurologic: Left upper extremity and lower extremity paresis.  Expressive aphasia.  Globally decreased muscle strength and tone otherwise.  GCS greater than 8    LABORATORY STUDIES:     Recent Results (from the past 36 hour(s))   CBC auto differential    Collection Time: 04/26/18  8:35 PM   Result Value Ref Range    WBC 6.56 3.90 - 12.70 K/uL    RBC 5.22 4.60 - 6.20 M/uL    Hemoglobin 14.8 14.0 - 18.0 g/dL    Hematocrit 42.7 40.0 - 54.0 %    MCV 82 82 - 98 fL    MCH 28.4 27.0 - 31.0 pg    MCHC 34.7 32.0 - 36.0 g/dL    RDW 13.3 11.5 - 14.5 %    Platelets 220 150 - 350 K/uL    MPV 10.4 9.2 - 12.9 fL    Gran # (ANC) 5.7 1.8 - 7.7 K/uL    Lymph # 0.5 (L) 1.0 - 4.8 K/uL    Mono # 0.3 0.3 - 1.0 K/uL    Eos # 0.0 0.0 - 0.5 K/uL    Baso # 0.01 0.00 - 0.20 K/uL    Gran% 87.2 (H) 38.0 - 73.0 %    Lymph% 7.8 (L) 18.0 - 48.0 %    Mono% 4.0 4.0 - 15.0 %    Eosinophil% 0.6 0.0 - 8.0 %    Basophil% 0.2 0.0 - 1.9 %    Differential Method Automated    Comprehensive metabolic panel    Collection Time: 04/26/18  8:35 PM   Result Value Ref Range    Sodium 139 136 - 145 mmol/L    Potassium 4.2 3.5 - 5.1 mmol/L    Chloride 105 95 - 110 mmol/L    CO2 25 23 - 29 mmol/L    Glucose 296 (H) 70 - 110 mg/dL    BUN, Bld 23 8 - 23 mg/dL    Creatinine 2.2 (H) 0.5 - 1.4 mg/dL    Calcium 9.9 8.7 - 10.5 mg/dL    Total Protein 7.7 6.0 - 8.4 g/dL    Albumin 3.8 3.5 - 5.2 g/dL    Total Bilirubin 0.4 0.1 - 1.0 mg/dL    Alkaline Phosphatase 78 55 - 135 U/L    AST 20 10 - 40 U/L     ALT 27 10 - 44 U/L    Anion Gap 9 8 - 16 mmol/L    eGFR if African American 36 (A) >60 mL/min/1.73 m^2    eGFR if non African American 31 (A) >60 mL/min/1.73 m^2   Magnesium    Collection Time: 04/26/18  8:35 PM   Result Value Ref Range    Magnesium 2.4 1.6 - 2.6 mg/dL   Protime-INR    Collection Time: 04/26/18  8:35 PM   Result Value Ref Range    Prothrombin Time 10.0 9.0 - 12.5 sec    INR 1.0 0.8 - 1.2   TSH    Collection Time: 04/26/18  8:35 PM   Result Value Ref Range    TSH 2.124 0.400 - 4.000 uIU/mL   LDL - Lipid Panel    Collection Time: 04/26/18  8:35 PM   Result Value Ref Range    Cholesterol 229 (H) 120 - 199 mg/dL    Triglycerides 160 (H) 30 - 150 mg/dL    HDL 49 40 - 75 mg/dL    LDL Cholesterol 148.0 63.0 - 159.0 mg/dL    HDL/Chol Ratio 21.4 20.0 - 50.0 %    Total Cholesterol/HDL Ratio 4.7 2.0 - 5.0    Non-HDL Cholesterol 180 mg/dL   B-Type natriuretic peptide    Collection Time: 04/26/18  8:35 PM   Result Value Ref Range     (H) 0 - 99 pg/mL   Troponin I    Collection Time: 04/26/18  8:35 PM   Result Value Ref Range    Troponin I 0.038 (H) 0.000 - 0.026 ng/mL   APTT    Collection Time: 04/26/18  8:35 PM   Result Value Ref Range    aPTT 25.3 21.0 - 32.0 sec   Urinalysis Clean Catch    Collection Time: 04/26/18  9:49 PM   Result Value Ref Range    Specimen UA Urine, Clean Catch     Color, UA Yellow Yellow, Straw, Linda    Appearance, UA Clear Clear    pH, UA 5.0 5.0 - 8.0    Specific Gravity, UA 1.025 1.005 - 1.030    Protein, UA 3+ (A) Negative    Glucose, UA 3+ (A) Negative    Ketones, UA Negative Negative    Bilirubin (UA) Negative Negative    Occult Blood UA 1+ (A) Negative    Nitrite, UA Negative Negative    Urobilinogen, UA Negative <2.0 EU/dL    Leukocytes, UA Negative Negative   Urinalysis Microscopic    Collection Time: 04/26/18  9:49 PM   Result Value Ref Range    RBC, UA 15 (H) 0 - 4 /hpf    WBC, UA 4 0 - 5 /hpf    Bacteria, UA Few (A) None-Occ /hpf    Yeast, UA Rare (A) None    Squam  Epithel, UA 2 /hpf    Hyaline Casts, UA 4 (A) 0-1/lpf /lpf    Granular Casts, UA 2 (A) None /lpf    Microscopic Comment SEE COMMENT    POCT glucose    Collection Time: 04/26/18 10:21 PM   Result Value Ref Range    POC Glucose 262 (A) 70 - 110 mg/dL       Lab Results   Component Value Date    INR 1.0 04/26/2018    INR 1.0 12/12/2012     No results found for: HGBA1C  No results for input(s): POCTGLUCOSE in the last 72 hours.        IMAGING:     Imaging Results          CT Head Without Contrast (Final result)  Result time 04/26/18 21:34:59    Final result by Denisse Bhardwaj MD (04/26/18 21:34:59)                 Impression:      No acute intracranial abnormalities identified.      Electronically signed by: Denisse Bhardwaj MD  Date:    04/26/2018  Time:    21:34             Narrative:    EXAMINATION:  CT HEAD WITHOUT CONTRAST    CLINICAL HISTORY:  Neuro deficit(s), subacute;    TECHNIQUE:  Low dose axial images were obtained through the head.  Coronal and sagittal reformations were also performed. Contrast was not administered.    COMPARISON:  MRI brain December 2012.    FINDINGS:  There is generalized cerebral volume loss with mild chronic microvascular ischemic disease.  Remote lacunar infarcts are visualized within the right basal ganglia.  No evidence of acute/recent major vascular distribution cerebral infarction, intraparenchymal hemorrhage, or intra-axial space occupying lesion. The ventricular system is normal in size and configuration with no evidence of hydrocephalus. No effacement of the skull-base cisterns. No abnormal extra-axial fluid collections or blood products.  Left maxillary sinus disease is noted.  The calvarium shows no significant abnormality.                               X-Ray Chest 1 View (Final result)  Result time 04/26/18 21:26:38    Final result by Denisse Bhardwaj MD (04/26/18 21:26:38)                 Impression:      No acute cardiopulmonary process identified.      Electronically  signed by: Denisse Bhardwaj MD  Date:    04/26/2018  Time:    21:26             Narrative:    EXAMINATION:  XR CHEST 1 VIEW    CLINICAL HISTORY:  Weakness    TECHNIQUE:  Single frontal view of the chest was performed.    COMPARISON:  December 2012.    FINDINGS:  Cardiac silhouette of is upper limits of normal in size, noting magnification on this single AP view.  Lungs are slightly hypoinflated which accentuates pulmonary vascular markings.  No evidence of focal consolidative process, pneumothorax, or significant effusion.  No acute osseous abnormality identified.                                  CONSULTS:     IP CONSULT TO TELEMEDICINE - STROKE  IP CONSULT TO PHYSICAL MEDICINE REHAB  IP CONSULT TO REGISTERED DIETITIAN/NUTRITIONIST  IP CONSULT TO SOCIAL WORK/CASE MANAGEMENT       ASSESSMENT & PLAN:     Primary Diagnosis:  Acute CVA (cerebrovascular accident)    Active Hospital Problems    Diagnosis  POA    *Acute CVA (cerebrovascular accident) [I63.9]  Yes     Priority: 1 - High    Acute renal failure [N17.9]  Yes     Priority: 2     CVA, old, hemiparesis [I69.359]  Not Applicable     Priority: 3     Hyperglycemia due to type 2 diabetes mellitus [E11.65]  Yes    Hypertension [I10]  Yes    Diabetes mellitus type 2, controlled [E11.9]  Yes    Dysphagia [R13.10]  Yes      Resolved Hospital Problems    Diagnosis Date Resolved POA   No resolved problems to display.         Cerebral vascular accident   Laterality of brain etiology cannot definitively be determined at this time, but suspected right brain injury based upon  physical exam and history  · Stroke risks include previous stroke, hypertension, hyperlipidemia, diabetes  · Symptoms of generalized weakness and worsening left-sided paresis and left upper lower extremity along with dysphasia/dysarthria  · Differential diagnosis includes CVA, TIA, subarachnoid hemorrhage, complex migraine, seizure, encephalopathy, and/or tumor  · Initiate stroke protocol  · Trend  NIH stroke scale  · STAT CT of head performed with no acute findings  · Obtain MRI brain and if not revealing, then MRA or CTA of head and neck to look at posterior circulation  · Bilateral carotid ultrasound  · 2-D echo with bubble study  · Obtain fasting lipids  · Statin therapy: Atorvastatin- 80 mg oral daily  · TSH, FT3, FT4  · Homocysteine level  · RPR  · HgA1c  · B12  · Folate  · Hemoccult  · Give Lovenox, ASA  · Seizures precaution  · Ativan 1 mg PRN Seizures / call neurologist after first dose  · EEG  · Hold ACEi, beta-blocker, etc while allowing permissive hypertension per stroke protocol  · Stroke education given and patient educated about both stroke prevention and symptoms to be mindful of. The patient was instructed to dial 911 for any signs or symptoms of stroke such as sudden weakness, numbness, dizziness, speech, gait, or visual changes  · Consult neurology for further evaluation and recommendations    Acute renal failure  · As evidenced by decrease in GFR.  Baseline creatinine = 1.1  · Will evaluate for pre-renal, intrarenal, and post-obstructive etiology.  · Obtain:  1.  protein/creatinine ratio  2. urine and serum osmolalities  3. urine electrolytes (Na, Cl, K)  4. LDH  5. Complement  6. Joseph's stain for eosinophils  · Renal ultrasound  · Monitor with serial Cr / GFR levels closely with serial labs  · Avoid nephrotoxic medications such as NSAIDs, IV contrast, or RAAS blockade  · Nephrology consult    Cerebral vascular disease  ·  evidence of acute stroke at this time; management as noted above  · Maintain adequate blood pressure control  · Heart healthy diet  · Aspirin  · Statin    Hypertension (Permissive due to CVA)  · Allow for permissive HTN in first 24-48 hours  · Thereafter goal while inpatient is a systolic blood pressure less than 140mmHg  · BP in acceptable range at this time  · Continue current home regimen with hold parameters  · PRN antihypertensives  available    Dysphagia/dysarthria  · Speech therapy consult for evaluation and recommendations  · Likely need formal swallow study    Hyperglycemia secondary to Diabetes mellitus type 2  · BG in acceptable range at this time  · Maintain w/ subcutaneous insulin management order set  · Hold oral diabetic meds  · ADA 1800 kcal diet  · BG goal while in patient is <180mg/dL  · HgA1c = Pending      VTE Risk Mitigation         Ordered     IP VTE LOW RISK PATIENT  Once      04/27/18 0248     Place sequential compression device  Until discontinued      04/27/18 0248            Adult PRN medications available   DVT prophylaxis given       DISPOSITION:     Will admit to the Hospital Medicine service for further evaluation and treatment.    Chart reviewed and updated where applicable.    High Risk Conditions:  Patient has an abrupt change in neurologic status: Acute CVA       ===============================================================    Silvano Velasquez MD, MPH  Department of Hospital Medicine   [facility]  [phone]  (7pm - 6am)          This note is dictated using Dragon Medical 360 voice recognition software.  There are word recognition mistakes that are occasionally missed on review.

## 2018-04-27 NOTE — ED TRIAGE NOTES
Pt arrived via EMS with c/o stroke symptoms x 1 week - only reports new onset speech difference, difficulty walking with his cane starting earlier this morning and L-hand weakness since yesterday. Has weakness to L-extremities - reports it's been that way since 1993.

## 2018-04-27 NOTE — PLAN OF CARE
Problem: Occupational Therapy Goal  Goal: Occupational Therapy Goal  Goals to be met by: 05/18/18     Patient will increase functional independence with ADLs by performing:    UE Dressing with Set-up Assistance.  LE Dressing with Minimal Assistance.  Grooming while standing at sink with Minimal Assistance.  Toileting from bedside commode with Minimal Assistance for hygiene and clothing management.   Supine to sit with Stand-by Assistance.  Toilet transfer to bedside commode with Contact Guard Assistance.     Outcome: Ongoing (interventions implemented as appropriate)  OT evaluation complete.  Pt Min -Mod A with ADLs and was Mod I PTA.  Will benefit from IP rehab.

## 2018-04-27 NOTE — PLAN OF CARE
Recommendations     Recommendation/Intervention:   1. Advance diet as able per SLP with 2000 ADA/2 gm sodium diet   2. RD to f/u with education needs and enteral support needs as appropriate     Goals: Initiate nutrition within 72 hrs  Nutrition Goal Status: new  Communication of RD Recs: reviewed with RN     D/C planning: Too soon to determine

## 2018-04-27 NOTE — HPI
62 y.o. male with HTN, DM and Hx of CVA (x3 per pt) presents to the ED via EMS c/o generalized weakness and difficulty clearing sputum from his throat (causing frequent cough) for the past week as well as slurred speech since this morning. Pt reports Hx of L sided weakness from his prior strokes, but denies any Hx of speech changes. He denies any numbness in his face or extremities. He denies fever, chest pain, SOB, abdominal pain, N/V.    Unable to obtain full adequate history.  Most is obtained per the medical record due to speech impediment with recent acute on chronic stroke and no family at bedside.  He seems to appropriately answer some questions and denies any current chest pain, shortness of breath or palpitations.  He he signaled no to any previous cardiac issues or any recent testing.  He was able to verbalize and asked how bad his heart was functioning.  He had a echocardiogram performed today which shows newly diagnosed cardiomyopathy in comparison to remote echo done in 2012.  He is resting comfortably in bed.

## 2018-04-27 NOTE — NURSING
@1361 - Able to split ASA suppository per pharmacist Susi.     @0003 - Notified Nenita in US of STAT order    @0010 - Per Nenita, US tech, since pt will be admitted - US will need to be done in AM. Attending MD made aware. Verbalizes agreement.

## 2018-04-27 NOTE — PLAN OF CARE
"TN met with patient and family at bedside to complete discharge needs assessment. TN explained duties of case management to patient's wife Mag. TN reviewed "[facility] Packet", "Discharge Planning Begins on Admission" brochure and discussed "Help at Home". Patient's wife stated that she and her three children assist at home. TN also discussed patient's responsibilities to manage his health at home. Patient and family was informed to leave folder at bedside during hospital stay. Contact information added to white board.    Patient requesting a walker at discharge. Patient's wife would like to discuss patient's discharge disposition with family.    PCP- eDanna Andino MD    Patient Preferred Pharmacy:   [id number] [place] - [address]  [facility]  [place] [address]  Phone: [phone] Fax: [phone]      Appointment Time Preference: afternoon       04/27/18 1610   Discharge Assessment   Assessment Type Discharge Planning Assessment   Assessment information obtained from? Caregiver  (Patient's wife )   Prior to hospitilization cognitive status: Unable to Assess  (Patient's wife state that he is usually oriented but is having difficulty speakiing )   Prior to hospitalization functional status: Independent;Assistive Equipment   Current cognitive status: Unable to Assess   Current Functional Status: Independent;Assistive Equipment   Lives With child(leda), adult;spouse  (Lives with wife and 3 children )   Able to Return to Prior Arrangements unable to determine at this time (comments)   Is patient able to care for self after discharge? Unable to determine at this time (comments)   Who are your caregiver(s) and their phone number(s)? Mag- wife @ [phone]   Patient's perception of discharge disposition home or selfcare   Readmission Within The Last 30 Days no previous admission in last 30 days   Patient currently being followed by outpatient case management? No   Patient currently " receives any other outside agency services? No   Equipment Currently Used at Home cane, straight;shower chair;rollator   Do you have any problems affording any of your prescribed medications? No   Is the patient taking medications as prescribed? yes   Does the patient have transportation home? Yes   Transportation Available car;family or friend will provide   Does the patient receive services at the [facility]? No   Discharge Plan A Home with family;Home   Discharge Plan B (TBD)   Patient/Family In Agreement With Plan yes

## 2018-04-27 NOTE — SUBJECTIVE & OBJECTIVE
"  Woke up with symptoms?: no  Last known normal: Last Known Normal Date:  (At least 2 days ago) Last Known Normal Time:  (Unable to determine)    Recent bleeding noted: no  Does the patient take any Blood Thinners? no  Medications: Antiplatelets:  aspirin and clopidogrel/Plavix      Past Medical History: hypertension, diabetes, hyperlipidemia and stroke    Past Surgical History: no major surgeries within the last 2 weeks    Family History: no relevant history    Social History: no smoking, no drinking, no drugs    Allergies:   No known drug allergies    Review of Systems   Constitutional: Positive for fatigue. Negative for chills, diaphoresis and fever.   HENT: Positive for trouble swallowing. Negative for hearing loss and tinnitus.    Eyes: Negative for photophobia and visual disturbance.   Respiratory: Negative for chest tightness and shortness of breath.    Cardiovascular: Negative for chest pain and palpitations.   Gastrointestinal: Positive for nausea and vomiting. Negative for abdominal pain.   Endocrine: Negative for cold intolerance and polyuria.   Genitourinary: Negative for hematuria.   Musculoskeletal: Positive for gait problem. Negative for arthralgias, neck pain and neck stiffness.   Allergic/Immunologic: Negative for immunocompromised state.   Neurological: Positive for facial asymmetry, speech difficulty and weakness. Negative for dizziness.   Hematological: Does not bruise/bleed easily.   Psychiatric/Behavioral: Negative for agitation, behavioral problems and confusion.     Objective:   Vitals: Blood pressure (!) 168/89, pulse 85, temperature 98.2 °F (36.8 °C), temperature source Oral, resp. rate 15, height 5' 3" (1.6 m), weight 68 kg (150 lb), SpO2 99 %. BP: 145/72, Respiratory Rate: 16 and Heart Rate: 73    CT READ: Yes  No hemmorhage. No mass effect. No early infarct signs.     Physical Exam   Constitutional: He appears well-developed and well-nourished. No distress.   HENT:   Head: Normocephalic " and atraumatic.   Eyes: EOM are normal. Pupils are equal, round, and reactive to light.   Neck: Normal range of motion. Neck supple.   Cardiovascular: Normal rate and regular rhythm.    Pulmonary/Chest: Effort normal. No respiratory distress.   Abdominal: He exhibits no mass.   Genitourinary:   Genitourinary Comments: No performed   Musculoskeletal: He exhibits no edema or deformity.   Neurological: He is alert. A cranial nerve deficit is present. No sensory deficit. He exhibits abnormal muscle tone.   Skin: No rash noted. He is not diaphoretic. No erythema.   Psychiatric: He has a normal mood and affect. His behavior is normal.

## 2018-04-27 NOTE — ASSESSMENT & PLAN NOTE
Newly diagnosed  Add beta blocker  No signs of volume overload  We'll discuss with family regarding further ischemic workup NST versus R/LHC

## 2018-04-27 NOTE — PT/OT/SLP EVAL
Occupational Therapy   Evaluation/Treatment    Name: Jigar Michaels  MRN: [id number]  Admitting Diagnosis:  Acute CVA (cerebrovascular accident)      Recommendations:     Discharge Recommendations: rehabilitation facility  Discharge Equipment Recommendations:  other (see comments) (TBD)  Barriers to discharge:  Other (Comment), Decreased caregiver support (Family works during day)    History:     Occupational Profile:  Living Environment: Pt lives with spouse and son in 2 story house with no FRANCIA.  Pt lives on 1st floor as bedroom and full bath are downstairs.  Pt's spouse and son work during the day so pt is alone during the day.  Previous level of function: Mod I  Roles and Routines: Mod I (Pt driving one handed with R UE PTA).  Equipment Owned:  cane, straight, shower chair (per spouse, has rollator, but does not use and only uses SC intermittently)  Assistance upon Discharge: Spouse and son in evenings.    Past Medical History:   Diagnosis Date    Diabetes mellitus     Hypertension     Stroke        History reviewed. No pertinent surgical history.    Subjective     Chief Complaint: Pt reports not feeling well in general.   Patient/Family stated goals: To get stronger  Communicated with: RN (Ana) prior to session.  Pain/Comfort:  · Pain Rating 1: 0/10    Patients cultural, spiritual, Mosque conflicts given the current situation:      Objective:     Patient found with: peripheral IV, telemetry, SCD    General Precautions: Standard, fall   Orthopedic Precautions:N/A   Braces: N/A     Occupational Performance:    Bed Mobility:    · Patient completed Rolling/Turning to Left with  minimum assistance  · Patient completed Rolling/Turning to Right with moderate assistance  · Patient completed Scooting/Bridging with minimum assistance  · Patient completed Supine to Sit with moderate assistance  · Patient completed Sit to Supine with moderate assistance    Functional Mobility/Transfers:  · Patient completed Sit <> Stand  "Transfer with minimum assistance  with  HHA   · Functional Mobility: Pt able to take 3 side steps along side of bed; "limping" on L LE    Activities of Daily Living:  · Grooming: contact guard assistance sitting EOB for washing face with R hand  · UB Dressing: moderate assistance donning gown as robe using hemitechnique  · LB Dressing: total assistance donning socks    Cognitive/Visual Perceptual:  Cognitive/Psychosocial Skills:     -       Oriented to: Person, Place and Time   -       Follows Commands/attention:Follows one-step commands  -       Communication: dysarthria  -       Memory: Impaired STM and Poor immediate recall  -       Safety awareness/insight to disability: impaired   -       Mood/Affect/Coping skills/emotional control: Withdrawn  Visual/Perceptual:      -difficult to formally assess; vision appears intact    Physical Exam:  Balance:    -       Min A sitting EOB; min-mod A standing  Postural examination/scapula alignment:    -       Rounded shoulders  -       Posterior pelvic tilt  Motor Planning:    -       R WFL, L impaired  Dominant hand:    -       Right  Upper Extremity Range of Motion:     -       Right Upper Extremity: WFL  -       Left Upper Extremity: Pt with hypertoncity affecting AROM; shoulder f and abd  ~ 80*, elbow ext ~ 10*, minimal wrist f/e, unable to fully extend fingers  Upper Extremity Strength:    -       Right Upper Extremity: WFL  -       Left Upper Extremity: 3-/5  Fine Motor Coordination:    -       Impaired  Left hand, finger to nose impaired, Right hand, finger to nose WFL, Left hand thumb/finger opposition skills impaired 2* hypertonicity and Right hand thumb/finger opposition skills WFL  Gross motor coordination:   R WFL, L impaired  Neurological:    -       Pt with L UE hypertonicity (3/4 on modified brooks scale)    Patient left HOB elevated with all lines intact, call button in reach, RN (Ana) notified and spouse present    [facility] 6 Click:  [facility] Total Score: " "13    Treatment & Education:  Pt sat EOB for self care tasks x 15 min with CGA - Min A to maintain balance.   Education:    Assessment:     Jigar Michaels is a 62 y.o. male with a medical diagnosis of Acute CVA (cerebrovascular accident).  He presents with change of status per spouse and pt x 2 days.  Pt was walking without AE, driving, and living alone during daytimes while family at work PTA.  Performance deficits affecting function are weakness, impaired endurance, impaired self care skills, impaired functional mobilty, impaired balance, decreased upper extremity function, decreased lower extremity function, decreased safety awareness, impaired coordination, impaired fine motor, abnormal tone, decreased ROM.      Rehab Prognosis:  Good; patient would benefit from acute skilled OT services to address these deficits and reach maximum level of function.         Clinical Decision Makin.  OT Low:  "Pt evaluation falls under low complexity for evaluation coding due to performance deficits noted in 1-3 areas as stated above and 0 co-morbities affecting current functional status. Data obtained from problem focused assessments. No modifications or assistance was required for completion of evaluation. Only brief occupational profile and history review completed."     Plan:     Patient to be seen 5 x/week, 6 x/week to address the above listed problems via self-care/home management, therapeutic activities, therapeutic exercises, neuromuscular re-education  · Plan of Care Expires: 18  · Plan of Care Reviewed with: patient, spouse    This Plan of care has been discussed with the patient who was involved in its development and understands and is in agreement with the identified goals and treatment plan    GOALS:    Occupational Therapy Goals        Problem: Occupational Therapy Goal    Goal Priority Disciplines Outcome Interventions   Occupational Therapy Goal     OT, PT/OT     Description:  Goals to be met by: " 05/18/18     Patient will increase functional independence with ADLs by performing:    UE Dressing with Set-up Assistance.  LE Dressing with Minimal Assistance.  Grooming while standing at sink with Minimal Assistance.  Toileting from bedside commode with Minimal Assistance for hygiene and clothing management.   Supine to sit with Stand-by Assistance.  Toilet transfer to bedside commode with Contact Guard Assistance.                      Time Tracking:     OT Date of Treatment: 04/27/18  OT Start Time: 1116  OT Stop Time: 1145  OT Total Time (min): 29 min    Billable Minutes:Evaluation 20  Therapeutic Activity 9    Margarita Downing OT  4/27/2018

## 2018-04-27 NOTE — CONSULTS
"  [facility]  Adult Nutrition  Consult Note    SUMMARY     Recommendations    Recommendation/Intervention:   1. Advance diet as able per SLP with 2000 ADA/2 gm sodium diet   2. RD to f/u with education needs and enteral support needs as appropriate    Goals: Initiate nutrition within 72 hrs  Nutrition Goal Status: new  Communication of RD Recs: reviewed with RN    D/C planning: Too soon to determine    Reason for Assessment    Reason for Assessment: consult  Diagnosis:  (Acute CVA)  Relevant Medical History: DM, Stroke, HTN    General Information Comments: Pt out of room for first visit, asleep at second visit without family available. Awaiting SLP eval. Patient appears nourished. hx/o dysphagia noted in chart review as well as decreased po intake PTA.  Discussed with RN concerning hx/o PEG and she was unsure at this time; however no mention in H&P.     Nutrition Risk Screen    Nutrition Risk Screen: no indicators present    Nutrition/Diet History    Food Preferences: JEN Catholic food preferences.   Do you have any cultural, spiritual, Catholic conflicts, given your current situation?: none  Factors Affecting Nutritional Intake: NPO    Anthropometrics    Temp: 98.2 °F (36.8 °C)  Height Method: Measured  Height: 5' 5" (165.1 cm)  Height (inches): 65 in  Weight Method: Bed Scale  Weight: 65.2 kg (143 lb 11.8 oz)  Weight (lb): 143.74 lb  Ideal Body Weight (IBW), Male: 136 lb  % Ideal Body Weight, Male (lb): 105.69 lb  BMI (Calculated): 24  BMI Grade: 18.5-24.9 - normal     Lab/Procedures/Meds    Pertinent Labs Reviewed: reviewed  Pertinent Medications Reviewed: reviewed    Physical Findings/Assessment    Overall Physical Appearance: nourished  Oral/Mouth Cavity: WDL  Skin: intact (Luigi 17)    Estimated/Assessed Needs    Weight Used For Calorie Calculations: 65.2 kg (143 lb 11.8 oz)  Energy Calorie Requirements (kcal): 1700 kcal  Energy Need Method: Kathy Huntre  Protein Requirements: 65 " g  Weight Used For Protein Calculations: 65.2 kg (143 lb 11.8 oz)     Fluid Need Method: RDA Method  RDA Method (mL): 1700    Nutrition Prescription Ordered    Current Diet Order: NPO    Evaluation of Received Nutrient/Fluid Intake    IV Fluid (mL): 2400  I/O: Not fully recorded  Energy Calories Required: not meeting needs  Protein Required: not meeting needs  Fluid Required: meeting needs  Comments: LBM: 4/26    Nutrition Risk    Level of Risk/Frequency of Follow-up:  (2 x week)     Assessment and Plan    Nutrition Dx: Inadequate energy intake r/t stroke w/u as evidenced by NPO  Nutrition Dx Status: New       Monitor and Evaluation    Food and Nutrient Intake: energy intake, food and beverage intake  Food and Nutrient Adminstration: diet order, enteral and parenteral nutrition administration  Knowledge/Beliefs/Attitudes: food and nutrition knowledge/skill  Physical Activity and Function: nutrition-related ADLs and IADLs  Anthropometric Measurements: weight, weight change  Biochemical Data, Medical Tests and Procedures: electrolyte and renal panel, glucose/endocrine profile  Nutrition-Focused Physical Findings: overall appearance     Nutrition Follow-Up    RD Follow-up?: Yes

## 2018-04-27 NOTE — PLAN OF CARE
Problem: Physical Therapy Goal  Goal: Physical Therapy Goal  Goals to be met by: 18     Patient will increase functional independence with mobility by performin. Supine to sit with Stand-by Assistance  2. Rolling to Left and Right with Stand-by Assistance  3. Sit to stand transfer with Stand-by Assistance using QC  4. Bed to chair transfer with Stand-by Assistance using Quad Cane  5. Gait  x 50 feet with Stand-by Assistance using Quad Cane   6. Lower extremity exercise program x 20 reps per handout, with assistance as needed    Pt will benefit from inpatient rehab.

## 2018-04-27 NOTE — ASSESSMENT & PLAN NOTE
Possible cryptogenic embolic source  We'll discuss with neurology regarding OAC appropriateness (*patient as well has PUD history)  If ischemic workup was negative can consider ILR

## 2018-04-27 NOTE — PLAN OF CARE
Problem: Patient Care Overview  Goal: Plan of Care Review  Patient remains free from injuries, alert, answers question correctly, denies pain, IV fluids infusing as ordered, left sided weakness noticed, family at bedside, safety maintained.

## 2018-04-27 NOTE — PLAN OF CARE
Problem: SLP Goal  Goal: SLP Goal  SLP Short Term Goals:  1. Patient will successfully participate in ongoing clinical swallow evaluation and tolerate po trials with no overt s/s of aspiration.   2. Patient will tolerate dental soft diet w/ thin liquids with no overt s/s of aspiration.   3. Patient will participate in full language/speech/cognitive assessment in order to obtain baseline.  Outcome: Ongoing (interventions implemented as appropriate)  4/27/2018: Swallow study completed this AM. No overt s/s of aspiration or airway threat noted with thin liquids, purees, or solids. Prolonged mastication and slow oral transit time noted with regular solids. RECS: Dental soft/thin liquids with 1:1 assist and strict adherence to standard swallow precautions. Dysarthria noted and pt's family reports pt presenting with anomia. ST w/ f/u to ensure safety with diet advancement and to assess speech/lang/cognition.  LUNA Calderón. CCC-SLP  Speech-Language Pathologist

## 2018-04-27 NOTE — SUBJECTIVE & OBJECTIVE
Teleconsult Time Documentation      Subjective:     Patient information was obtained from past medical records.    Past Medical History:   Past Medical History:   Diagnosis Date    Diabetes mellitus     Hypertension     Stroke        Past Surgical History: History reviewed. No pertinent surgical history.    Family History: History reviewed. No pertinent family history.     Social History:   Social History   Substance Use Topics    Smoking status: Never Smoker    Smokeless tobacco: Not on file    Alcohol use No        Medications:   Current Facility-Administered Medications:     aspirin EC tablet 325 mg, 325 mg, Oral, Daily, Silvano Velasquez MD    atorvastatin tablet 80 mg, 80 mg, Oral, Daily, Silvano Velasquez MD    clopidogrel tablet 75 mg, 75 mg, Oral, Daily, Silvano Velasquez MD    dextrose 50% injection 12.5 g, 12.5 g, Intravenous, PRN, Silvano Velasquez MD    glucagon (human recombinant) injection 1 mg, 1 mg, Intramuscular, PRN, Silvano Velasquez MD    insulin aspart U-100 pen 1-10 Units, 1-10 Units, Subcutaneous, Q6H PRN, Silvano Velasquez MD, 2 Units at 04/27/18 0546    insulin detemir U-100 pen 15 Units, 15 Units, Subcutaneous, QHS, Dorina Summers MD    labetalol injection 10 mg, 10 mg, Intravenous, Q15 Min PRN, Silvano Velasquez MD    mineral oil enema 1 enema, 1 enema, Rectal, Daily PRN, Silvano Velasquez MD    ondansetron disintegrating tablet 8 mg, 8 mg, Oral, Q8H PRN, Silvano Velasquez MD    pantoprazole 40 mg in dextrose 5 % 100 mL infusion (ready to mix system), 40 mg, Intravenous, Daily, Daphnie Guo MD, 40 mg at 04/27/18 1120    polyethylene glycol packet 17 g, 17 g, Oral, Daily, Silvano Velasquez MD    promethazine suppository 25 mg, 25 mg, Rectal, Q6H PRN, Silvano Velasquez MD    senna-docusate 8.6-50 mg per tablet 1 tablet, 1 tablet, Oral, BID, Silvano Velasquez MD    sodium chloride 0.9% bolus 500 mL, 500 mL, Intravenous, Continuous PRN, Silvano  CASEY Velasquez MD    sodium chloride 0.9% flush 3 mL, 3 mL, Intravenous, Q8H, Silvano Velasquez MD, 3 mL at 04/27/18 0600    Allergies: Review of patient's allergies indicates:  No Known Allergies    Review of Systems   Unable to perform ROS: acuity of condition     Objective:     Vitals:   Vitals:    04/27/18 1526   BP: (!) 176/81   Pulse: 90   Resp: 16   Temp: 98.7 °F (37.1 °C)        Physical Exam   Constitutional: He appears well-developed and well-nourished. No distress.   HENT:   Head: Normocephalic and atraumatic.   Eyes: Conjunctivae and EOM are normal. Pupils are equal, round, and reactive to light.   Neck: Normal range of motion. Neck supple. No thyromegaly present.   Cardiovascular: Normal rate, regular rhythm and normal heart sounds.    No murmur heard.  Pulmonary/Chest: Effort normal and breath sounds normal. No respiratory distress. He has no wheezes. He has no rales. He exhibits no tenderness.   Abdominal: Soft. Bowel sounds are normal.   Musculoskeletal: He exhibits no edema.   Neurological: He is alert. A cranial nerve deficit is present. He exhibits abnormal muscle tone. Coordination abnormal.   Skin: Skin is warm and dry.   Psychiatric:   Unable to fully assess

## 2018-04-27 NOTE — ED PROVIDER NOTES
Encounter Date: 4/26/2018    SCRIBE #1 NOTE: I, Bob Janiya, am scribing for, and in the presence of, Daphnie Guo MD. Other sections scribed: HPI, ROS.       History     Chief Complaint   Patient presents with    Weakness     generalized weakness x1 week. Pt reports he might have had another stroke but the onset of symptoms were over a week ago     CC: Weakness  HPI: This 62 y.o. male with HTN, DM and Hx of CVA (x3 per pt) presents to the ED via EMS c/o generalized weakness and difficulty clearing sputum from his throat (causing frequent cough) for the past week as well as slurred speech since this morning. Pt reports Hx of L sided weakness from his prior strokes, but denies any Hx of speech changes. He denies any numbness in his face or extremities. He denies fever, chest pain, SOB, abdominal pain, N/V.    Family (wife and son, both live in home) later arrives and states that pt has been c/o feeling weak and has had worse than usual cough for the past 3-4 days. Wife states that this morning pt became more weak, was unable to eat food, and had more slurred speech.      The history is provided by the patient, the EMS personnel, the spouse and a relative.     Review of patient's allergies indicates:  No Known Allergies  Past Medical History:   Diagnosis Date    Diabetes mellitus     Hypertension     Stroke      History reviewed. No pertinent surgical history.  History reviewed. No pertinent family history.  Social History   Substance Use Topics    Smoking status: Never Smoker    Smokeless tobacco: Not on file    Alcohol use No     Review of Systems   Unable to perform ROS: Other   Constitutional: Negative for chills and fever.   HENT: Positive for trouble swallowing. Negative for congestion, ear pain, rhinorrhea and sore throat.    Eyes: Negative for pain and visual disturbance.   Respiratory: Positive for cough. Negative for shortness of breath.    Cardiovascular: Negative for chest pain.    Gastrointestinal: Negative for abdominal pain, diarrhea, nausea and vomiting.   Genitourinary: Negative for dysuria, flank pain and frequency.   Musculoskeletal: Negative for arthralgias, back pain, myalgias and neck pain.   Skin: Negative for wound.   Neurological: Positive for speech difficulty and weakness (residual L sided extremity weakness). Negative for facial asymmetry, numbness and headaches.   Psychiatric/Behavioral: Negative for agitation.   All other systems reviewed and are negative.      Physical Exam     Initial Vitals [04/26/18 2019]   BP Pulse Resp Temp SpO2   (!) 168/89 85 15 98.2 °F (36.8 °C) 99 %      MAP       115.33         Physical Exam    Nursing note and vitals reviewed.  Constitutional: He appears well-developed and well-nourished. He is not diaphoretic. No distress.   HENT:   Head: Normocephalic and atraumatic.   Nose: Nose normal.   Eyes: Conjunctivae and EOM are normal. Pupils are equal, round, and reactive to light.   Neck: Normal range of motion. Neck supple.   Cardiovascular: Normal rate and regular rhythm.   Pulmonary/Chest: Breath sounds normal. No respiratory distress.   Abdominal: Soft. Bowel sounds are normal. He exhibits no distension. There is no tenderness. There is no rebound and no guarding.   Musculoskeletal: Normal range of motion. He exhibits no edema or tenderness.   Neurological: He is alert. No cranial nerve deficit or sensory deficit.   Residual weakness to LUE and LLE weakness 2nd/to prior CVA. Patient is having difficulty following commands because of his previous stroke. Unable to adequately evaluate patient NIHSS because he cannot follow commands at this time. He also has expressive aphasia.   Skin: Skin is warm and dry. Capillary refill takes less than 2 seconds.   Psychiatric: Thought content normal.         ED Course   Procedures  Labs Reviewed   CBC W/ AUTO DIFFERENTIAL - Abnormal; Notable for the following:        Result Value    Lymph # 0.5 (*)     Gran%  87.2 (*)     Lymph% 7.8 (*)     All other components within normal limits   COMPREHENSIVE METABOLIC PANEL - Abnormal; Notable for the following:     Glucose 296 (*)     Creatinine 2.2 (*)     eGFR if  36 (*)     eGFR if non  31 (*)     All other components within normal limits   B-TYPE NATRIURETIC PEPTIDE - Abnormal; Notable for the following:      (*)     All other components within normal limits   TROPONIN I - Abnormal; Notable for the following:     Troponin I 0.038 (*)     All other components within normal limits   MAGNESIUM   PROTIME-INR   TSH   APTT   URINALYSIS   LIPID PANEL   URINALYSIS MICROSCOPIC   POCT GLUCOSE   POCT GLUCOSE MONITORING CONTINUOUS        Imaging Results          CT Head Without Contrast (Final result)  Result time 04/26/18 21:34:59    Final result by Denisse Bhardwaj MD (04/26/18 21:34:59)                 Impression:      No acute intracranial abnormalities identified.      Electronically signed by: Denisse Bhardwaj MD  Date:    04/26/2018  Time:    21:34             Narrative:    EXAMINATION:  CT HEAD WITHOUT CONTRAST    CLINICAL HISTORY:  Neuro deficit(s), subacute;    TECHNIQUE:  Low dose axial images were obtained through the head.  Coronal and sagittal reformations were also performed. Contrast was not administered.    COMPARISON:  MRI brain December 2012.    FINDINGS:  There is generalized cerebral volume loss with mild chronic microvascular ischemic disease.  Remote lacunar infarcts are visualized within the right basal ganglia.  No evidence of acute/recent major vascular distribution cerebral infarction, intraparenchymal hemorrhage, or intra-axial space occupying lesion. The ventricular system is normal in size and configuration with no evidence of hydrocephalus. No effacement of the skull-base cisterns. No abnormal extra-axial fluid collections or blood products.  Left maxillary sinus disease is noted.  The calvarium shows no significant  abnormality.                               X-Ray Chest 1 View (Final result)  Result time 04/26/18 21:26:38    Final result by Denisse Bhardwaj MD (04/26/18 21:26:38)                 Impression:      No acute cardiopulmonary process identified.      Electronically signed by: Denisse Bhardwaj MD  Date:    04/26/2018  Time:    21:26             Narrative:    EXAMINATION:  XR CHEST 1 VIEW    CLINICAL HISTORY:  Weakness    TECHNIQUE:  Single frontal view of the chest was performed.    COMPARISON:  December 2012.    FINDINGS:  Cardiac silhouette of is upper limits of normal in size, noting magnification on this single AP view.  Lungs are slightly hypoinflated which accentuates pulmonary vascular markings.  No evidence of focal consolidative process, pneumothorax, or significant effusion.  No acute osseous abnormality identified.                                   Medical Decision Making:   Differential Diagnosis:   Possible CVA.  Generalized Weakness.  Other:   I have discussed this case with another health care provider.       <> Summary of the Discussion: I consulted Neurology and Dr Hposon from Tele neurology evaluated patient using the tele stroke device.  He recommend admission for full stroke work up including Brain MRI in the morning.  Patient care was discussed with the hospitalist on call Dr Silvano Velasquez. He will admit patient to Dr Dorina Summers for further evaluation and management.            Scribe Attestation:   Scribe #1: I performed the above scribed service and the documentation accurately describes the services I performed. I attest to the accuracy of the note.    Attending Attestation:         Attending Critical Care:   Critical Care Times:   Direct Patient Care (initial evaluation, reassessments, and time considering the case)................................................................15 minutes.   Additional History from reviewing old medical records or taking additional history from the family,  EMS, PCP, etc.......................5 minutes.   Ordering, Reviewing, and Interpreting Diagnostic Studies...............................................................................................................10 minutes.   Documentation..................................................................................................................................................................................10 minutes.   Consultation with other Physicians. .................................................................................................................................................10 minutes.   ==============================================================  · Total Critical Care Time - exclusive of procedural time: 50 minutes.  ==============================================================  Critical care was necessary to treat or prevent imminent or life-threatening deterioration of the following conditions: stroke.   The following critical care procedures were done by me (see procedure notes): pulse oximetry.   Critical care was time spent personally by me on the following activities: obtaining history from patient or relative, examination of patient, ordering lab, x-rays, and/or EKG, development of treatment plan with patient or relative, review of old charts, ordering and performing treatments and interventions, evaluation of patient's response to treatment, discussion with consultants and re-evaluation of patient's conition.   Critical Care Condition: potentially life-threatening     Physician Attestation for Scribe:  Physician Attestation Statement for Scribe #1: I, Daphnie Guo MD, reviewed documentation, as scribed by Bob Denson in my presence, and it is both accurate and complete.     Comments: I, Dr. Guo, personally performed the services described in this documentation. All medical record entries made by the scribe were at my direction and in my presence.  I have reviewed  the chart and agree that the record reflects my personal performance and is accurate and complete.                 Clinical Impression:   The primary encounter diagnosis was Acute renal failure, unspecified acute renal failure type. Diagnoses of Weakness, Stroke, Cough, and CVA (cerebral vascular accident) were also pertinent to this visit.    Disposition:   Disposition: Admitted  Condition: Stable                        Daphnie Guo MD  04/26/18 [id number]

## 2018-04-27 NOTE — PT/OT/SLP EVAL
Speech Language Pathology Evaluation  Bedside Swallow    Patient Name:  Jigar Michaels   MRN:  [id number]  Admitting Diagnosis: Acute CVA (cerebrovascular accident)    Recommendations:                 General Recommendations:  Dysphagia therapy and Speech language evaluation  Diet recommendations:  Dental Soft, Thin   Aspiration Precautions: 1 bite/sip at a time, Alternating bites/sips, Assistance with meals, Avoid talking while eating, Feed only when awake/alert, HOB to 90 degrees, Meds whole 1 at a time, Remain upright 30 minutes post meal, Small bites/sips and Standard aspiration precautions   General Precautions: Standard, aspiration, aphasia, fall, dental soft    History:     HPI: Jigar Michaels is a 62 y.o. male that (in part)  has a past medical history of Diabetes mellitus; Hypertension; and Stroke.  Presents to [facility] Emergency Department complaining of generalized weakness.  Subacute onset 1 week ago with progressive worsening.  Last known to be normal several days ago.  Was found to be significantly worse this morning by the patient's family member reports significant worsening of the left upper and lower extremity chronic weakness from a previous stroke.  He is unable to communicate effectively and is coughing, which may be consistent with aspiration.  Denies fever, chills, or recent infection.  No recent hospitalizations.  No report of syncope or collapse.  Significantly decreased oral intake over the last few days.  Patient was refusing to come to the Hospital prior to this.  Unfortunately due to the current condition of the patient remained of the history is limited     In the emergency department routine laboratory studies, CT of the head, and a consultation to tele-neurology were performed.  There is concern for acute CVA.  He is being admitted for stroke workup and neurology consultation.  MRI of the morning.    Past Medical History:   Diagnosis Date    Diabetes mellitus      Hypertension     Stroke        History reviewed. No pertinent surgical history.    Social History: Patient lives with spouse.    Prior diet: reg solids/thin liquids.    Subjective     Pt coughing into emesis bag as SLP entered room. Clear saliva noted with no emesis or discolored mucus. Pt's spouse at bedside reports pt presenting with dysarthria and anomia.     Pain/Comfort:  · Pain Rating 1: 0/10    Objective:     Oral Musculature Evaluation  · Oral Musculature: left weakness, WFL  · Dentition: present and adequate  · Secretion Management: adequate  · Mucosal Quality: good  · Mandibular Strength and Mobility: WFL  · Oral Labial Strength and Mobility: impaired retraction, impaired coordination, functional pursing, functional seal  · Lingual Strength and Mobility: WFL  · Buccal Strength and Mobility: WFL  · Volitional Cough: WFL  · Volitional Swallow: WFL  · Voice Prior to PO Intake: Strong, clear  · Oral Musculature Comments: Dysarthria noted and reported c/b imprecise phoneme articulation 2/2 dcr oral muscular strength, coordination, and ROM    Bedside Swallow Eval:   Consistencies Assessed:  · Thin liquids (x6 straw sips)  · Puree (x5 tsp bites)  · Solids (x2 pieces of nelly cracker)     Oral Phase:   · WFL for thin liquids and purees  · Prolonged mastication and oral transit time noted with regular solids    Pharyngeal Phase:   · no overt clinical signs/symptoms of aspiration  · no overt clinical signs/symptoms of pharyngeal dysphagia    Compensatory Strategies  · None    Treatment: SLP provided skilled education to pt's spouse re: rationale for BSE, role of SLP in POC, swallow study results, diet recs, and swallow precautions. Pt's spouse verbalized understanding and agreement of all information provided. Discussed swallow study results/recommendations with pt's RN.    Assessment:     Jigar Michaels is a 62 y.o. male with an SLP diagnosis of Dysarthria.  He presents with no overt s/s of aspiration or pharyngeal  dysphagia with any consistencies. Prolonged mastication and oral transit time noted with regular solids. RECS: Dental soft/thin liquids with 1:1 assist and strict adherence to standard swallow precautions. Dysarthria noted and pt's family reports pt presenting with anomia. ST w/ f/u to ensure safety with diet advancement and to assess speech/lang/cognition.    Goals:    SLP Goals        Problem: SLP Goal    Goal Priority Disciplines Outcome   SLP Goal     SLP Ongoing (interventions implemented as appropriate)   Description:  SLP Short Term Goals:  1. Patient will successfully participate in ongoing clinical swallow evaluation and tolerate po trials with no overt s/s of aspiration.   2. Patient will tolerate dental soft diet w/ thin liquids with no overt s/s of aspiration.   3. Patient will participate in full language/speech/cognitive assessment in order to obtain baseline.                    Plan:     · Patient to be seen:  3 x/week   · Plan of Care expires:  05/26/18  · Plan of Care reviewed with:  patient, spouse, other (see comments) (COLLEEN Perez)   · SLP Follow-Up:  Yes       Discharge recommendations:  other (see comments) (TBD)     Time Tracking:     SLP Treatment Date:   04/27/18  Speech Start Time:  1120  Speech Stop Time:  1140     Speech Total Time (min):  20 min    Billable Minutes: Eval Swallow and Oral Function 20 minutes    Gris Puentes CCC-SLP  04/27/2018

## 2018-04-27 NOTE — CARE UPDATE
Pt seen and examined, and I agree with Dr. Velasquez's assessment and plan. Will continue current care outlined in the H&P with the following addition:    MRI confirms acute CVA with concern for possible embolic cause. ECHO showed new CHF, therefore, will consult Cardiology and he may need [facility], but will make that determination after getting CTA and discussing options with Neurology. Continue permissive HTN and will likely need rehab for placement. Will consult social service to begin discharge planning with rehab placement. Will check HgbA1C in am and start basal/prandial + SSI.    BERNADINE Summers M.D.   Hospitalist   [facility]   Department of Hospital Medicine   Pager: [phone]

## 2018-04-27 NOTE — CONSULTS
SW uploaded clinicals via [facility] to refer patient for inpatient rehab treatment enclosed: face sheet, H&P, MD notes, therapy notes, MD orders, labs, and MARS.

## 2018-04-27 NOTE — HPI
Jigar Michaels is a 62 y.o. male that (in part)  has a past medical history of Diabetes mellitus; Hypertension; and Stroke.  Presents to [facility] Emergency Department complaining of generalized weakness.  Subacute onset 1 week ago with progressive worsening.  Last known to be normal several days ago.  Was found to be significantly worse this morning by the patient's family member reports significant worsening of the left upper and lower extremity chronic weakness from a previous stroke.  He is unable to communicate effectively and is coughing, which may be consistent with aspiration.  Denies fever, chills, or recent infection.  No recent hospitalizations.  No report of syncope or collapse.  Significantly decreased oral intake over the last few days.  Patient was refusing to come to the Hospital prior to this.  Unfortunately due to the current condition of the patient remained of the history is limited    In the emergency department routine laboratory studies, CT of the head, and a consultation to tele-neurology were performed.  There is concern for acute CVA.  He is being admitted for stroke workup and neurology consultation.  MRI of the morning.    Hospital medicine has been asked to admit for further evaluation and treatment.   Patient tolerated EGD without incident. Bed and patient repositioned for colonoscopy.

## 2018-04-27 NOTE — CONSULTS
[facility]  Vascular Neurology  Comprehensive Stroke Center  Tele-Consultation Note      Consults    Consulting Provider: Spoke Physician:: Dr. Daphnie Guo  Current Providers  No providers found    Patient Location: [facility]  Spoke hospital nurse at bedside with patient assisting consultant.     Patient information was obtained from patient, spouse/SO and relative(s).       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  NIHSS 5, CT head without acute abnormality.  Recurrent acute right subcortical infarct vs recrudesce of residual deficits in the setting of occult infection or metabolic derangements.  No a candidate for iv alteplase due to late presentation, unknown last seen normal. Don't suspect LVO.  Recommend admission to the hospital for MRI brain and further stroke work up if MRI confirms acute ischemia. Check UA and serologies.   mg daily, atorvastatin. PT/OT/speech and neurology consults in am.    STROKE DOCUMENTATION     Acute Stroke Times:   Acute Stroke Times   Last Known Normal Date:  (At least 2 days ago)  Last Known Normal Time:  (Unable to determine)  Symptom Onset Date:  (Unable to determine)  Symptom Onset Time:  (unable to determine)  Stroke Team Called Date: 04/26/18  Stroke Team Called Time: 2124  Stroke Team Arrival Date: 04/26/18  Stroke Team Arrival Time: 2124  CT Interpretation Time: 2132  Decision to Treat Time for Alteplase:  (No iv alteplase candidate)  Decision to Treat Time for IR:  (No IR candidate)    NIH Scale:  Interval: 24 hrs post onset of symptoms +/- 20 mins  1a. Level Of Consciousness: 0-->Alert: keenly responsive  1b. LOC Questions: 0-->Answers both questions correctly  1c. LOC Commands: 0-->Performs both tasks  "correctly  2. Best Gaze: 0-->Normal  3. Visual: 0-->No visual loss  4. Facial Palsy: 1-->Minor paralysis (flattened nasolabial fold, asymmetry on smiling)  5a. Motor Arm, Left: 1-->Drift: limb holds 90 (or 45) degrees, but drifts down before full 10 seconds: does not hit bed or other support  5b. Motor Arm, Right: 0-->No drift: limb holds 90 (or 45) degrees for full 10 secs  6a. Motor Leg, Left: 2-->Some effort against gravity: leg falls to bed by 5 secs, but has some effort against gravity  6b. Motor Leg, Right: 0-->No drift: leg holds 30 degree position for full 5 secs  7. Limb Ataxia: 0-->Absent  8. Sensory: 0-->Normal: no sensory loss  9. Best Language: 0-->No aphasia: normal  10. Dysarthria: 1-->Mild-to-moderate dysarthria: patient slurs at least some words and, at worst, can be understood with some difficulty  11. Extinction and Inattention (formerly Neglect): 0-->No abnormality  Total (NIH Stroke Scale): 5     Modified Manchester Score: 2  [place]way Coma Scale:15   ABCD2 Score:    [id number] Score:   HAS -BLED Score:   ICH Score:   Hunt & Jurado Classification:       Diagnoses: acute right subcortical infarct vs recrudesce of residual deficits in the setting of occult infection or metabolic derangements.  No new Assessment & Plan notes have been filed under this hospital service since the last note was generated.  Service: Vascular Neurology      Blood pressure (!) 168/89, pulse 85, temperature 98.2 °F (36.8 °C), temperature source Oral, resp. rate 15, height 5' 3" (1.6 m), weight 68 kg (150 lb), SpO2 99 %.  Alteplase Eligible?: No  Alteplase Recommendation: Alteplase not recommended due to Outside of treatment window   Possible Interventional Revascularization Candidate? 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  As per family, patient has been getting progressively weak for the past couple of days but his left sided weakness and slurred speech worsened today to the point that he couldn't walk or speak well.  Very nauseated and vomiting every time he tries to eat.      tNo notes on file      Woke up with symptoms?: no  Last known normal: Last Known Normal Date:  (At least 2 days ago) Last Known Normal Time:  (Unable to determine)    Recent bleeding noted: no  Does the patient take any Blood Thinners? no  Medications: Antiplatelets:  aspirin and clopidogrel/Plavix      Past Medical History: hypertension, diabetes, hyperlipidemia and stroke    Past Surgical History: no major surgeries within the last 2 weeks    Family History: no relevant history    Social History: no smoking, no drinking, no drugs    Allergies:   No known drug allergies    Review of Systems   Constitutional: Positive for fatigue. Negative for chills, diaphoresis and fever.   HENT: Positive for trouble swallowing. Negative for hearing loss and tinnitus.    Eyes: Negative for photophobia and visual disturbance.   Respiratory: Negative for chest tightness and shortness of breath.    Cardiovascular: Negative for chest pain and palpitations.   Gastrointestinal: Positive for nausea and vomiting. Negative for abdominal pain.   Endocrine: Negative for cold intolerance and polyuria.   Genitourinary: Negative for hematuria.   Musculoskeletal: Positive for gait problem. Negative for arthralgias, neck pain and neck stiffness.   Allergic/Immunologic: Negative for immunocompromised state.   Neurological: Positive for facial asymmetry, speech difficulty and weakness. Negative for dizziness.   Hematological: Does not bruise/bleed easily.   Psychiatric/Behavioral: Negative for agitation, behavioral problems and confusion.     Objective:   Vitals: Blood  "pressure (!) 168/89, pulse 85, temperature 98.2 °F (36.8 °C), temperature source Oral, resp. rate 15, height 5' 3" (1.6 m), weight 68 kg (150 lb), SpO2 99 %. BP: 145/72, Respiratory Rate: 16 and Heart Rate: 73    CT READ: Yes  No hemmorhage. No mass effect. No early infarct signs.     Physical Exam   Constitutional: He appears well-developed and well-nourished. No distress.   HENT:   Head: Normocephalic and atraumatic.   Eyes: EOM are normal. Pupils are equal, round, and reactive to light.   Neck: Normal range of motion. Neck supple.   Cardiovascular: Normal rate and regular rhythm.    Pulmonary/Chest: Effort normal. No respiratory distress.   Abdominal: He exhibits no mass.   Genitourinary:   Genitourinary Comments: No performed   Musculoskeletal: He exhibits no edema or deformity.   Neurological: He is alert. A cranial nerve deficit is present. No sensory deficit. He exhibits abnormal muscle tone.   Skin: No rash noted. He is not diaphoretic. No erythema.   Psychiatric: He has a normal mood and affect. His behavior is normal.             Recommended the emergency room physician to have a brief discussion with the patient and/or family if available regarding the risks and benefits of treatment, and to briefly document the occurrence of that discussion in his clinical encounter note.     The attending portion of this evaluation, treatment, and documentation was performed per Rishi Hopson MD via audiovisual.    Billing code:  (non-intervention mild to moderate stroke, TIA, some mimics)    · This patient has a critical neurological condition/illness, with some potential for high morbidity and mortality.  · There is a moderate probability for acute neurological change leading to clinical and possibly life-threatening deterioration requiring highest level of physician preparedness for urgent intervention.  · Care was coordinated with other physicians involved in the patient's care.  · Radiologic studies and " laboratory data were reviewed and interpreted, and plan of care was re-assessed based on the results.  · Diagnosis, treatment options and prognosis may have been discussed with the patient and/or family members or caregiver.      Consult End Time: 9:43 pm     Rishi Hopson MD  Comprehensive Stroke Center  Vascular Neurology   [facility]

## 2018-04-28 NOTE — PROGRESS NOTES
[facility]  Cardiology  Progress Note    Patient Name: Jigar Michaels  MRN: [id number]  Admission Date: 4/26/2018  Hospital Length of Stay: 2 days  Code Status: Full Code   Attending Physician: Alexander Florence MD   Primary Care Physician: Hannah Andino MD  Expected Discharge Date:   Principal Problem:Acute CVA (cerebrovascular accident)    Subjective:     Hospital Course:   4-27: Newly diagnosed cardiomyopathy admitted with acute on chronic CVA      Interval History: No worsening cardiopulmonary complaints and sitting in chair today    Telemetry: Normal sinus rhythm    Review of Systems   All other systems reviewed and are negative.    Objective:     Vital Signs (Most Recent):  Temp: 97.9 °F (36.6 °C) (04/28/18 1131)  Pulse: 95 (04/28/18 1131)  Resp: 20 (04/28/18 1131)  BP: (!) 178/79 (04/28/18 1131)  SpO2: (!) 92 % (04/28/18 1131) Vital Signs (24h Range):  Temp:  [97.2 °F (36.2 °C)-98.7 °F (37.1 °C)] 97.9 °F (36.6 °C)  Pulse:  [83-95] 95  Resp:  [16-20] 20  SpO2:  [92 %-95 %] 92 %  BP: (155-178)/(65-81) 178/79     Weight: 65.2 kg (143 lb 11.8 oz)  Body mass index is 23.92 kg/m².     SpO2: (!) 92 %  O2 Device (Oxygen Therapy): room air      Intake/Output Summary (Last 24 hours) at 04/28/18 1329  Last data filed at 04/28/18 0500   Gross per 24 hour   Intake              180 ml   Output                0 ml   Net              180 ml       Lines/Drains/Airways     Peripheral Intravenous Line                 Peripheral IV - Single Lumen 04/26/18 2035 Right Hand 1 day                Physical Exam   Constitutional: He is oriented to person, place, and time. No distress.   Cardiovascular: Normal rate and regular rhythm.    Pulmonary/Chest: Effort normal and breath sounds normal.   Musculoskeletal: He exhibits no edema.   Neurological: He is alert and oriented to person, place, and time.       Significant Labs:   BMP:   Recent Labs  Lab 04/26/18 2035 04/28/18  0613   * 120*    141   K 4.2 3.8   CL  105 108   CO2 25 25   BUN 23 21   CREATININE 2.2* 2.0*   CALCIUM 9.9 8.9   MG 2.4 2.0    and CBC   Recent Labs  Lab 04/26/18 2035 04/28/18  0613   WBC 6.56 8.63   HGB 14.8 13.4*   HCT 42.7 38.1*    195       Significant Imaging: Echocardiogram:   2D echo with color flow doppler:   Results for orders placed or performed during the hospital encounter of 04/26/18   2D echo with color flow doppler   Result Value Ref Range    EF 35 (A) 55 - 65    Mitral Valve Regurgitation TRIVIAL     Diastolic Dysfunction Yes (A)     Aortic Valve Regurgitation MILD TO MODERATE (A)     Est. 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

## 2018-04-28 NOTE — PLAN OF CARE
Problem: Physical Therapy Goal  Goal: Physical Therapy Goal  Goals to be met by: 18     Patient will increase functional independence with mobility by performin. Supine to sit with Stand-by Assistance  2. Rolling to Left and Right with Stand-by Assistance  3. Sit to stand transfer with Stand-by Assistance using QC  4. Bed to chair transfer with Stand-by Assistance using Quad Cane  5. Gait  x 50 feet with Stand-by Assistance using Quad Cane   6. Lower extremity exercise program x 20 reps per handout, with assistance as needed     Outcome: Ongoing (interventions implemented as appropriate)  Pt will benefit from further therapy in order to get back to PLOF.

## 2018-04-28 NOTE — PLAN OF CARE
Problem: Patient Care Overview  Goal: Plan of Care Review  Outcome: Ongoing (interventions implemented as appropriate)  Patient remains free from injury and falls. Patient with left sided weakness. Aspiration precautions maintained at all times. Slurred speech noted. Patient AAOx3. Skin remains intact. Blood glucose monitored every 6 hours. Incontinence care provided as needed. Wife at bedside. Plan of care continued.

## 2018-04-28 NOTE — HOSPITAL COURSE
4-27: Newly diagnosed cardiomyopathy admitted with acute on chronic CVA  4-29: Transferred to ICU for possible aspiration

## 2018-04-28 NOTE — HOSPITAL COURSE
Mr. Michaels was admitted with an acute CVA confirmed by MRI with concern for possible embolic cause. ECHO showed new CHF, therefore, Cardiology consulted.  Neurology consulted. Pt treated with permissive HTN, ASA, statin and was being considered for rehab placement.  Patient aspirated after being fed some meat by wife on 4/28.  He became more hypoxic and transferred to ICU. Respiratory status continued to worsen and required endotracheal intubation. He was weaned off all sedation on 5/2 and remained off all sedation while on the ventilator. Pt only able to make eye contact intermittently, otherwise unable to move extremities when asked to and with babinski sign. Repeat MRI of the brain showed progression of his stroke. These clinical findings were concerning for significant basilar artery involvement. On 5/5, patient had more coughing spells and making more eye contact when called by his daughter in Guinean. Fentanyl PRN given for comfort. However, patient without any significant neurological improvement and prognosis poor for any meaningful recovery. Wife agreed to DNR status, but reportedly wanted to proceed with trach and PEG placement. ENT and GI consulted. Plavix held temporarily in anticipation for these procedures. Family conference done on 5/8 and family seemed to be in agreement with withdrawal of care but wanted more time to finalize plan. Then on 5/9, another family conference was done and they agreed to initiate comfort measures and patient was terminally extubated. Pt was pronounced dead at 5:34 AM on Thursday, May 10, 2018. Family was at the bedside and condolences were given.

## 2018-04-28 NOTE — ASSESSMENT & PLAN NOTE
MRI showing acute lacunar-type infarctions involving the superior right terrence and in the left cerebral peduncle as above.  Neurology consulted.  Permissive HTN.  Currently on ASA and Plavix.  PT/OT evaluation.  May need inpatient rehab.

## 2018-04-28 NOTE — PT/OT/SLP PROGRESS
Physical Therapy Treatment    Patient Name:  Jigar Michaels   MRN:  [id number]    Recommendations:     Discharge Recommendations:  rehabilitation facility   Discharge Equipment Recommendations:  TBD   Barriers to discharge: decreaed mobility     Assessment:     Jigar Michaels is a 62 y.o. male admitted with a medical diagnosis of Acute CVA (cerebrovascular accident).  He presents with the following impairments/functional limitations:  weakness, impaired endurance, impaired self care skills, impaired functional mobilty, gait instability, impaired balance, decreased lower extremity function, decreased upper extremity function, decreased coordination, decreased safety awareness, decreased ROM, impaired coordination , impaired fine motor .    Rehab Prognosis:  Fair +; patient would benefit from acute skilled PT services to address these deficits and reach maximum level of function.      Recent Surgery: * No surgery found *      Plan:     During this hospitalization, patient to be seen daily to address the above listed problems via gait training, therapeutic activities, therapeutic exercises  · Plan of Care Expires:  05/11/18   Plan of Care Reviewed with: patient, spouse    Subjective     Communicated with nurse Black prior to session.  Patient found supine in bed upon PT entry to room, agreeable to treatment. Pt's spouse present .      Chief Complaint: coughing and nausea , nurse Santoro notified, pt pre-medicated for activity.   Patient comments/goals: to get stronger  Pain/Comfort:  · Pain Rating 1: 0/10  · Pain Rating Post-Intervention 1: 0/10    Patients cultural, spiritual, Druze conflicts given the current situation:      Objective:     Patient found with: bed alarm, FCD, telemetry, peripheral IV     General Precautions: Standard, fall   Orthopedic Precautions:N/A   Braces: N/A     Functional Mobility:  · Bed Mobility:     · Scooting: moderate assistance to scoot EOB   · Supine to Sit: minimum assistance, HOB elevated ,  bed rail.  · Transfers:     · Sit to Stand: x 5 trials from bed and 1 trial from bedside chair  minimum assistance and moderate assistance( from chair )  with quad cane.V/T's for safety technique and sequence.   · Bed to Chair: moderate assistance with  no AD  using  Stand Pivot  · Gait:    Patient ambulated  ~ 4-5 side steps x 2 and ~ 2-3 steps fwd/bwd x 2 on level tile with Small base quad cane with Moderate Assistance. Noted with decreased L foot clearing , difficult advancing LLE, L lateral leaning  . Pt required L knee blocked upon standing / ambulating 2* L  knee buckled . V/T cues for upright posture.    Pt with demonstarting a  3-point gait with decreased shaji, increased time in double stance, decreased velocity of limb motion, decreased step length, decreased stride length, increased stride width, decreased swing-to-stance ratio, decreased toe-to-floor clearance and decreased weight-shifting ability.Impairments contributing to gait deviations include impaired balance, impaired coordination, impaired motor control, impaired postural control, decreased ROM and decreased strength    · Balance:  Fair+ with sitting balance, poor with standing.      AM-PAC 6 CLICK MOBILITY  Turning over in bed (including adjusting bedclothes, sheets and blankets)?: 4  Sitting down on and standing up from a chair with arms (e.g., wheelchair, bedside commode, etc.): 2  Moving from lying on back to sitting on the side of the bed?: 3  Moving to and from a bed to a chair (including a wheelchair)?: 2  Need to walk in hospital room?: 2  Climbing 3-5 steps with a railing?: 2  Total Score: 15       Therapeutic Activities and Exercises:   pt performed transfer and gait training as above.  Pt tolerated sitting balance EOB x ~ 15 min with SBA-CGA . Performed weight shifting/ reaching  side to side , fwd/bwd . V/T cues to activated abdominal to maintain upright posture.   George/bill gown with mod A .   Pt performed seated BLE x 10 reps :  LAQ.     Patient left up in reclined chair with all lines intact, call button in reach, nurse notified and spouse present..    GOALS:    Physical Therapy Goals        Problem: Physical Therapy Goal    Goal Priority Disciplines Outcome Goal Variances Interventions   Physical Therapy Goal     PT/OT, PT Ongoing (interventions implemented as appropriate)     Description:  Goals to be met by: 18     Patient will increase functional independence with mobility by performin. Supine to sit with Stand-by Assistance  2. Rolling to Left and Right with Stand-by Assistance  3. Sit to stand transfer with Stand-by Assistance using QC  4. Bed to chair transfer with Stand-by Assistance using Quad Cane  5. Gait  x 50 feet with Stand-by Assistance using Quad Cane   6. Lower extremity exercise program x 20 reps per handout, with assistance as needed                      Time Tracking:     PT Received On: 18  PT Start Time: 0950     PT Stop Time: 1037  PT Total Time (min): 47 min     Billable Minutes: Gait Training 15, Therapeutic Activity 15 and Neuromuscular Re-education 12    Treatment Type: Treatment  PT/PTA: PTA     PTA Visit Number: 1     Sera Gutierrez, APPLE  2018

## 2018-04-28 NOTE — PROGRESS NOTES
[facility] [place]  Neurology  Progress Note    Patient Name: Jigar Michaels  MRN: [id number]  Admission Date: 4/26/2018  Hospital Length of Stay: 2 days  Code Status: Full Code   Attending Provider: Alexander Florence MD  Primary Care Physician: Hannah Andino MD   Principal Problem:Acute CVA (cerebrovascular accident)    Subjective:     Interval History: 63 y/o male with medical Hx as listed below comes to ED for weakness on left side. Pt has Hx of stroke with residual left sided weakness but family reports worsening of motor deficits. His wife states that he is able to walk and feed himself at baseline but his speech is slurred. He is adherent to medications according to wife. Pt has already had at least two strokes.    -4/28/18: Pt with severe coughing when eating.    Current Neurological Medications:     Current Facility-Administered Medications   Medication Dose Route Frequency Provider Last Rate Last Dose    aspirin EC tablet 325 mg  325 mg Oral Daily Silvano Velasquez MD   325 mg at 04/28/18 0846    atorvastatin tablet 80 mg  80 mg Oral Daily Silvano Velasquez MD   80 mg at 04/28/18 0846    clopidogrel tablet 75 mg  75 mg Oral Daily Silvano Velasquez MD   75 mg at 04/28/18 0846    dextrose 50% injection 12.5 g  12.5 g Intravenous PRN Silvano Velasquez MD        glucagon (human recombinant) injection 1 mg  1 mg Intramuscular PRN Silvano Velasquez MD        insulin aspart U-100 pen 1-10 Units  1-10 Units Subcutaneous Q6H PRN Silvano Velasquez MD   2 Units at 04/27/18 0546    insulin detemir U-100 pen 15 Units  15 Units Subcutaneous QHS Dorina Summers MD   15 Units at 04/27/18 2133    labetalol injection 10 mg  10 mg Intravenous Q15 Min PRN Silvano Velasquez MD        mineral oil enema 1 enema  1 enema Rectal Daily PRN Silvano Velasquez MD        ondansetron disintegrating tablet 8 mg  8 mg Oral Q8H PRN Silvano Velasquez MD   8 mg at 04/28/18 0959    pantoprazole 40 mg in dextrose 5  % 100 mL infusion (ready to mix system)  40 mg Intravenous Daily Daphnie Guo MD   40 mg at 04/28/18 0846    polyethylene glycol packet 17 g  17 g Oral Daily Silvano Velasquez MD   17 g at 04/28/18 0846    promethazine suppository 25 mg  25 mg Rectal Q6H PRN Silvano Velasquez MD        senna-docusate 8.6-50 mg per tablet 1 tablet  1 tablet Oral BID Silvano Velasquez MD   1 tablet at 04/28/18 0846    sodium chloride 0.9% bolus 500 mL  500 mL Intravenous Continuous PRN Silvano Velasquez MD        sodium chloride 0.9% flush 3 mL  3 mL Intravenous Q8H Silvano Velasquez MD   3 mL at 04/28/18 0633       Review of Systems   Constitutional: Negative for fever.   HENT: Negative for trouble swallowing.    Eyes: Negative for photophobia.   Respiratory: Negative for stridor.    Cardiovascular: Negative for palpitations.   Gastrointestinal: Negative for abdominal pain.   Genitourinary: Negative for dysuria.   Musculoskeletal: Negative for back pain.   Neurological: Negative for headaches.   Psychiatric/Behavioral: Negative for confusion.          Objective:     Vital Signs (Most Recent):  Temp: 98.5 °F (36.9 °C) (04/28/18 0805)  Pulse: 86 (04/28/18 0805)  Resp: 20 (04/28/18 0805)  BP: (!) 162/75 (04/28/18 0805)  SpO2: 95 % (04/28/18 0805) Vital Signs (24h Range):  Temp:  [97.2 °F (36.2 °C)-98.7 °F (37.1 °C)] 98.5 °F (36.9 °C)  Pulse:  [83-90] 86  Resp:  [16-20] 20  SpO2:  [92 %-95 %] 95 %  BP: (155-176)/(65-81) 162/75     Weight: 65.2 kg (143 lb 11.8 oz)  Body mass index is 23.92 kg/m².    Physical Exam  Constitutional: He is oriented to person, place, and time. No distress.   HENT:   Head: Normocephalic.   Eyes: Right eye exhibits no discharge. Left eye exhibits no discharge.   Neck: Normal range of motion.   Cardiovascular: Normal rate and regular rhythm.    Pulmonary/Chest: Effort normal and breath sounds normal.   Abdominal: Soft. Bowel sounds are normal.   Musculoskeletal: He exhibits no edema.    Neurological: He is oriented to person, place, and time.   Skin: He is not diaphoretic.   Psychiatric: His speech is slurred.         NEUROLOGICAL EXAMINATION:      MENTAL STATUS   Oriented to person, place, and time.   Speech: slurred   Level of consciousness: alert     CRANIAL NERVES      CN III, IV, VI   Right pupil: Size: 2 mm. Shape: regular.   Left pupil: Size: 2 mm. Shape: regular.   Nystagmus: none   Ophthalmoparesis: none     CN V   Right facial sensation deficit: none  Left facial sensation deficit: none     CN VII   Right facial weakness: none  Left facial weakness: central     CN IX, X   Palate: asymmetric     CN XII   Tongue deviation: left     MOTOR EXAM        increased tone on LUE; partial ROM    Right 4+/5       Significant Labs:   CBC:   Recent Labs  Lab 04/26/18 2035 04/28/18  0613   WBC 6.56 8.63   HGB 14.8 13.4*   HCT 42.7 38.1*    195     CMP:   Recent Labs  Lab 04/26/18 2035 04/28/18  0613   * 120*    141   K 4.2 3.8    108   CO2 25 25   BUN 23 21   CREATININE 2.2* 2.0*   CALCIUM 9.9 8.9   MG 2.4 2.0   PROT 7.7 6.0   ALBUMIN 3.8 2.9*   BILITOT 0.4 0.5   ALKPHOS 78 65   AST 20 27   ALT 27 22   ANIONGAP 9 8   EGFRNONAA 31* 34*       Assessment and Plan:     61 y/o male with acute stroke     1. Stroke: acute areas of infarction on posterior circulation. With his multiple previous strokes and now to more areas this suggests embolism. No Hx of atrial fibrillation but new HF.            -MRA head. Pt with abnormal Cr and will avoid use of IV contrast.           -ASA and statin for now. Considering OAC but will what MRA of head shows.           -PT/OT/ST: inpatient rehab.    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

## 2018-04-28 NOTE — CARE UPDATE
Cross Cover Note:    Called for rescue around 5pm. Patient was being fed by his wife and had an aspiration event. Resp/RN suctioned with improvement. However, the patient continued with increased RR, and on venti mask at 94 %.  FIO2 of 55%.  . BP stable.  Will move to ICU to monitor overnight. NPO.  IV fluids. IV Clinda for now. CXR.  Close monitoring in ICU. Lung exam has improved after suctioning.

## 2018-04-28 NOTE — SUBJECTIVE & OBJECTIVE
Interval History: No acute events overnight.    Review of Systems   HENT: Negative for ear discharge and ear pain.    Eyes: Negative for pain and itching.   Endocrine: Negative for polyphagia and polyuria.   Neurological: Negative for seizures and syncope.     Objective:     Vital Signs (Most Recent):  Temp: 97.9 °F (36.6 °C) (04/28/18 1131)  Pulse: 95 (04/28/18 1131)  Resp: 20 (04/28/18 1131)  BP: (!) 178/79 (04/28/18 1131)  SpO2: (!) 92 % (04/28/18 1131) Vital Signs (24h Range):  Temp:  [97.2 °F (36.2 °C)-98.7 °F (37.1 °C)] 97.9 °F (36.6 °C)  Pulse:  [83-95] 95  Resp:  [16-20] 20  SpO2:  [92 %-95 %] 92 %  BP: (155-178)/(65-81) 178/79     Weight: 65.2 kg (143 lb 11.8 oz)  Body mass index is 23.92 kg/m².    Intake/Output Summary (Last 24 hours) at 04/28/18 1353  Last data filed at 04/28/18 0500   Gross per 24 hour   Intake              180 ml   Output                0 ml   Net              180 ml      Physical Exam   Constitutional: He is oriented to person, place, and time. No distress.   HENT:   Head: Normocephalic and atraumatic.   Eyes: Conjunctivae are normal. Right eye exhibits no discharge. Left eye exhibits no discharge.   Neck: Neck supple.   Cardiovascular: Normal rate, regular rhythm and normal heart sounds.    Pulmonary/Chest: Effort normal and breath sounds normal. No stridor.   Abdominal: Soft. Bowel sounds are normal.   Musculoskeletal: Normal range of motion. He exhibits no deformity.   Neurological: He is alert and oriented to person, place, and time.   Slurred speech   Skin: Skin is warm and dry. He is not diaphoretic.       Significant Labs:   BMP:   Recent Labs  Lab 04/28/18  0613   *      K 3.8      CO2 25   BUN 21   CREATININE 2.0*   CALCIUM 8.9   MG 2.0     CBC:   Recent Labs  Lab 04/26/18 2035 04/28/18  0613   WBC 6.56 8.63   HGB 14.8 13.4*   HCT 42.7 38.1*    195       Significant Imaging: I have reviewed all pertinent imaging results/findings within the past 24  hours.

## 2018-04-28 NOTE — NURSING
Called to patient's room by his wife, upon assessment pt was coughing persistently. Obtained V/S immediately and O2 sats were 82%. Set up suction with yanker and suctioned patient's mouth. Food was found within the suction tubing. Pt's O2 sats did not improve so pt was placed on a venti-mask and sats increased to 88-89%. Deep suction was performed to both the nose and the mouth by ICU nurse. , house supervisor, 2 respiratory techs and myself were at the bedside. Pt continued to cough with gurgling sounds. Pt was alert to speech the entire event. Pt's sats continued to drop again to 82-83% so he was placed on a non-rebreather. Sats increased to 90-93%. Patient alert and coughing but still gurgling. The decision was made at this time by  to transfer patient to ICU for further evaluation. Rolled patient down to ICU with Mae (ICU nurse). Gave report to nurse.

## 2018-04-28 NOTE — PROGRESS NOTES
[facility] Hospital Medicine  Progress Note    Patient Name: Jigar Michaels  MRN: [id number]  Patient Class: IP- Inpatient   Admission Date: 4/26/2018  Length of Stay: 2 days  Attending Physician: Alexander Florence MD  Primary Care Provider: Hannah Andino MD        Subjective:     Principal Problem:Acute CVA (cerebrovascular accident)    HPI:  Jigar Michaels is a 62 y.o. male that (in part)  has a past medical history of Diabetes mellitus; Hypertension; and Stroke.  Presents to [facility] Emergency Department complaining of generalized weakness.  Subacute onset 1 week ago with progressive worsening.  Last known to be normal several days ago.  Was found to be significantly worse this morning by the patient's family member reports significant worsening of the left upper and lower extremity chronic weakness from a previous stroke.  He is unable to communicate effectively and is coughing, which may be consistent with aspiration.  Denies fever, chills, or recent infection.  No recent hospitalizations.  No report of syncope or collapse.  Significantly decreased oral intake over the last few days.  Patient was refusing to come to the Hospital prior to this.  Unfortunately due to the current condition of the patient remained of the history is limited    In the emergency department routine laboratory studies, CT of the head, and a consultation to tele-neurology were performed.  There is concern for acute CVA.  He is being admitted for stroke workup and neurology consultation.  MRI of the morning.    Hospital medicine has been asked to admit for further evaluation and treatment.     Hospital Course:  MRI confirms acute CVA with concern for possible embolic cause. ECHO showed new CHF, therefore, Cardiology consulted.  Neurology consulted. Continue permissive HTN and will likely need rehab for placement. Will consult social service to begin discharge planning with rehab placement.     Interval  History: No acute events overnight.    Review of Systems   HENT: Negative for ear discharge and ear pain.    Eyes: Negative for pain and itching.   Endocrine: Negative for polyphagia and polyuria.   Neurological: Negative for seizures and syncope.     Objective:     Vital Signs (Most Recent):  Temp: 97.9 °F (36.6 °C) (04/28/18 1131)  Pulse: 95 (04/28/18 1131)  Resp: 20 (04/28/18 1131)  BP: (!) 178/79 (04/28/18 1131)  SpO2: (!) 92 % (04/28/18 1131) Vital Signs (24h Range):  Temp:  [97.2 °F (36.2 °C)-98.7 °F (37.1 °C)] 97.9 °F (36.6 °C)  Pulse:  [83-95] 95  Resp:  [16-20] 20  SpO2:  [92 %-95 %] 92 %  BP: (155-178)/(65-81) 178/79     Weight: 65.2 kg (143 lb 11.8 oz)  Body mass index is 23.92 kg/m².    Intake/Output Summary (Last 24 hours) at 04/28/18 1353  Last data filed at 04/28/18 0500   Gross per 24 hour   Intake              180 ml   Output                0 ml   Net              180 ml      Physical Exam   Constitutional: He is oriented to person, place, and time. No distress.   HENT:   Head: Normocephalic and atraumatic.   Eyes: Conjunctivae are normal. Right eye exhibits no discharge. Left eye exhibits no discharge.   Neck: Neck supple.   Cardiovascular: Normal rate, regular rhythm and normal heart sounds.    Pulmonary/Chest: Effort normal and breath sounds normal. No stridor.   Abdominal: Soft. Bowel sounds are normal.   Musculoskeletal: Normal range of motion. He exhibits no deformity.   Neurological: He is alert and oriented to person, place, and time.   Slurred speech   Skin: Skin is warm and dry. He is not diaphoretic.       Significant Labs:   BMP:   Recent Labs  Lab 04/28/18  0613   *      K 3.8      CO2 25   BUN 21   CREATININE 2.0*   CALCIUM 8.9   MG 2.0     CBC:   Recent Labs  Lab 04/26/18 2035 04/28/18  0613   WBC 6.56 8.63   HGB 14.8 13.4*   HCT 42.7 38.1*    195       Significant Imaging: I have reviewed all pertinent imaging results/findings within the past 24  hours.    Assessment/Plan:      * Acute CVA (cerebrovascular accident)    MRI showing acute lacunar-type infarctions involving the superior right terrence and in the left cerebral peduncle as above.  Neurology consulted.  Permissive HTN.  Currently on ASA and Plavix.  PT/OT evaluation.  May need inpatient rehab.          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  Cardiology consulted.          Acute renal failure    Last Creat we have in record is from 2012.  May have underlying CKD secondary to HTN and DM (but unable to determine).  Stable.  Continue to monitor.          Type 2 diabetes mellitus    Uncontrolled with hyperglycemia.  Elevated HgA1c.  Continue current regimen.          Hyperglycemia due to type 2 diabetes mellitus              CVA, old, hemiparesis              Hypertension    Permissive HTN for now.          Dysphagia                VTE Risk Mitigation         Ordered     IP VTE LOW RISK PATIENT  Once      04/27/18 0248     Place sequential compression device  Until discontinued      04/27/18 0248              Alexander Florence MD  Department of Hospital Medicine   [facility]

## 2018-04-28 NOTE — PT/OT/SLP PROGRESS
Occupational Therapy      Patient Name:  Jigar Michaels   MRN:  [id number]    Patient not seen today secondary to Patient ill (Comment). Patient found slumped in chair, fatigue N/V reported from family member in room. OT will follow-up tomorrow as able.    Viki Wheeler, OT  4/28/2018

## 2018-04-28 NOTE — PROGRESS NOTES
1810: Pt arrived to  post rapid response. Pt gurgling on 55% VM. Pt placed on cardiac monitor and suctioned. Will continue to monitor.    1815: notified MD Chew of pt's temp of 101.4, new orders given for tylenol IV and order BC.

## 2018-04-28 NOTE — SUBJECTIVE & OBJECTIVE
Interval History: No worsening cardiopulmonary complaints and sitting in chair today    Telemetry: Normal sinus rhythm    Review of Systems   All other systems reviewed and are negative.    Objective:     Vital Signs (Most Recent):  Temp: 97.9 °F (36.6 °C) (04/28/18 1131)  Pulse: 95 (04/28/18 1131)  Resp: 20 (04/28/18 1131)  BP: (!) 178/79 (04/28/18 1131)  SpO2: (!) 92 % (04/28/18 1131) Vital Signs (24h Range):  Temp:  [97.2 °F (36.2 °C)-98.7 °F (37.1 °C)] 97.9 °F (36.6 °C)  Pulse:  [83-95] 95  Resp:  [16-20] 20  SpO2:  [92 %-95 %] 92 %  BP: (155-178)/(65-81) 178/79     Weight: 65.2 kg (143 lb 11.8 oz)  Body mass index is 23.92 kg/m².     SpO2: (!) 92 %  O2 Device (Oxygen Therapy): room air      Intake/Output Summary (Last 24 hours) at 04/28/18 1329  Last data filed at 04/28/18 0500   Gross per 24 hour   Intake              180 ml   Output                0 ml   Net              180 ml       Lines/Drains/Airways     Peripheral Intravenous Line                 Peripheral IV - Single Lumen 04/26/18 2035 Right Hand 1 day                Physical Exam   Constitutional: He is oriented to person, place, and time. No distress.   Cardiovascular: Normal rate and regular rhythm.    Pulmonary/Chest: Effort normal and breath sounds normal.   Musculoskeletal: He exhibits no edema.   Neurological: He is alert and oriented to person, place, and time.       Significant Labs:   BMP:   Recent Labs  Lab 04/26/18 2035 04/28/18  0613   * 120*    141   K 4.2 3.8    108   CO2 25 25   BUN 23 21   CREATININE 2.2* 2.0*   CALCIUM 9.9 8.9   MG 2.4 2.0    and CBC   Recent Labs  Lab 04/26/18 2035 04/28/18  0613   WBC 6.56 8.63   HGB 14.8 13.4*   HCT 42.7 38.1*    195       Significant Imaging: Echocardiogram:   2D echo with color flow doppler:   Results for orders placed or performed during the hospital encounter of 04/26/18   2D echo with color flow doppler   Result Value Ref Range    EF 35 (A) 55 - 65    Mitral Valve  Regurgitation TRIVIAL     Diastolic Dysfunction Yes (A)     Aortic Valve Regurgitation MILD TO MODERATE (A)     Est. PA Systolic Pressure 31.09     Pericardial Effusion NONE     Tricuspid Valve Regurgitation TRIVIAL TO MILD

## 2018-04-28 NOTE — ASSESSMENT & PLAN NOTE
Last Creat we have in record is from 2012.  May have underlying CKD secondary to HTN and DM (but unable to determine).  Stable.  Continue to monitor.

## 2018-04-29 NOTE — PROGRESS NOTES
[facility] Hospital Medicine  Progress Note    Patient Name: Jigar Michaels  MRN: [id number]  Patient Class: IP- Inpatient   Admission Date: 4/26/2018  Length of Stay: 3 days  Attending Physician: Junior Chew MD  Primary Care Provider: Hannah Andino MD        Subjective:     Principal Problem:Acute CVA (cerebrovascular accident)    HPI:  Jigar Michaels is a 62 y.o. male that (in part)  has a past medical history of Diabetes mellitus; Hypertension; and Stroke.  Presents to [facility] Emergency Department complaining of generalized weakness.  Subacute onset 1 week ago with progressive worsening.  Last known to be normal several days ago.  Was found to be significantly worse this morning by the patient's family member reports significant worsening of the left upper and lower extremity chronic weakness from a previous stroke.  He is unable to communicate effectively and is coughing, which may be consistent with aspiration.  Denies fever, chills, or recent infection.  No recent hospitalizations.  No report of syncope or collapse.  Significantly decreased oral intake over the last few days.  Patient was refusing to come to the Hospital prior to this.  Unfortunately due to the current condition of the patient remained of the history is limited    In the emergency department routine laboratory studies, CT of the head, and a consultation to tele-neurology were performed.  There is concern for acute CVA.  He is being admitted for stroke workup and neurology consultation.  MRI of the morning.    Hospital medicine has been asked to admit for further evaluation and treatment.     Hospital Course:  MRI confirms acute CVA with concern for possible embolic cause. ECHO showed new CHF, therefore, Cardiology consulted.  Neurology consulted. Continue permissive HTN and will likely need rehab for placement. Will consult social service to begin discharge planning with rehab placement.  Patient  apparently aspirated after being fed some meat by wife on 4/28.  Became more hypoxic and transferred to ICU.  Placed NPO and started on NRB mask.      Interval History: Transferred to ICU after episode of probable aspiration.    Review of Systems   HENT: Negative for ear discharge and ear pain.    Eyes: Negative for pain and itching.   Endocrine: Negative for polyphagia and polyuria.   Neurological: Negative for seizures and syncope.     Objective:     Vital Signs (Most Recent):  Temp: 97.8 °F (36.6 °C) (04/29/18 1115)  Pulse: 80 (04/29/18 1400)  Resp: (!) 33 (04/29/18 1400)  BP: (!) 146/67 (04/29/18 1400)  SpO2: 100 % (04/29/18 1400) Vital Signs (24h Range):  Temp:  [97.8 °F (36.6 °C)-101.4 °F (38.6 °C)] 97.8 °F (36.6 °C)  Pulse:  [] 80  Resp:  [18-46] 33  SpO2:  [84 %-100 %] 100 %  BP: (118-184)/() 146/67     Weight: 65 kg (143 lb 4.8 oz)  Body mass index is 23.85 kg/m².    Intake/Output Summary (Last 24 hours) at 04/29/18 1545  Last data filed at 04/29/18 1400   Gross per 24 hour   Intake          1661.25 ml   Output                0 ml   Net          1661.25 ml      Physical Exam   Constitutional: He is oriented to person, place, and time. No distress.   HENT:   Head: Normocephalic and atraumatic.   Eyes: Conjunctivae are normal. Right eye exhibits no discharge. Left eye exhibits no discharge.   Neck: Neck supple.   Cardiovascular: Normal rate, regular rhythm and normal heart sounds.    Pulmonary/Chest: No stridor.   Slightly tachypnic.  Coarse BS bilaterally   Abdominal: Soft. Bowel sounds are normal.   Musculoskeletal: Normal range of motion. He exhibits no deformity.   Neurological: He is alert and oriented to person, place, and time.   Slurred speech   Skin: Skin is warm and dry. He is not diaphoretic.       Significant Labs:   BMP:     Recent Labs  Lab 04/28/18  0613 04/29/18  0401   * 208*    141   K 3.8 4.3    108   CO2 25 21*   BUN 21 29*   CREATININE 2.0* 2.4*   CALCIUM  8.9 8.8   MG 2.0  --      CBC:     Recent Labs  Lab 04/28/18  0613 04/29/18  0401   WBC 8.63 11.77   HGB 13.4* 13.4*   HCT 38.1* 38.1*    177       Significant Imaging: I have reviewed all pertinent imaging results/findings within the past 24 hours.    Assessment/Plan:      * Acute CVA (cerebrovascular accident)    MRI showing acute lacunar-type infarctions involving the superior right terrence and in the left cerebral peduncle as above.  Neurology consulted.  Permissive HTN.  Currently on ASA and Plavix.  PT/OT evaluation.  May need inpatient rehab.  Acute hypoxic respiratory failure (not POA)--secondary to episode of probable aspiration.  Moved to ICU.  Currently doing better on NRB.  Will try to wean down oxygen.  Started on Clindamycin.  ST already following.          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  Cardiology consulted.          Acute renal failure    Last Creat we have in record is from 2012.  May have underlying CKD secondary to HTN and DM (but unable to determine).  Stable.  Continue to monitor.          Type 2 diabetes mellitus    Uncontrolled with hyperglycemia.  Elevated HgA1c.  Insulin sliding scale.          Hyperglycemia due to type 2 diabetes mellitus              CVA, old, hemiparesis              Hypertension    Permissive HTN for now.          Dysphagia    Apparent episode of aspiration.  ST already following.          VTE Risk Mitigation         Ordered     enoxaparin injection 30 mg  Daily      04/29/18 1549     IP VTE LOW RISK PATIENT  Once      04/27/18 0248     Place sequential compression device  Until discontinued      04/27/18 0248          Critical care time spent on the evaluation and treatment of severe organ dysfunction, review of pertinent labs and imaging studies, discussions with consulting providers and discussions with patient/family: 40 minutes.    Alexander Florence MD  Department of Hospital  Medicine   [facility]

## 2018-04-29 NOTE — PROGRESS NOTES
Seen briefly in ICU transferred last night for aspiration post feeding.  He is somnolent and unable to answer any questions as a.m.  His wife is not at bedside currently.  We will currently defer plans for further cardiac workup pending improvement in baseline status.

## 2018-04-29 NOTE — PLAN OF CARE
Problem: Patient Care Overview  Goal: Plan of Care Review  Pt continues in the ICU.  No fever overnight.  Wife at bedside.  Pt is alert.  sats 93-98%.  Pt has a nonproductive cough.  Pt is sinus to sinus tach on the monitor. Pt denies any pain. No falls/injuries this shift.

## 2018-04-29 NOTE — SUBJECTIVE & OBJECTIVE
Interval History: Transferred to ICU after episode of probable aspiration.    Review of Systems   HENT: Negative for ear discharge and ear pain.    Eyes: Negative for pain and itching.   Endocrine: Negative for polyphagia and polyuria.   Neurological: Negative for seizures and syncope.     Objective:     Vital Signs (Most Recent):  Temp: 97.8 °F (36.6 °C) (04/29/18 1115)  Pulse: 80 (04/29/18 1400)  Resp: (!) 33 (04/29/18 1400)  BP: (!) 146/67 (04/29/18 1400)  SpO2: 100 % (04/29/18 1400) Vital Signs (24h Range):  Temp:  [97.8 °F (36.6 °C)-101.4 °F (38.6 °C)] 97.8 °F (36.6 °C)  Pulse:  [] 80  Resp:  [18-46] 33  SpO2:  [84 %-100 %] 100 %  BP: (118-184)/() 146/67     Weight: 65 kg (143 lb 4.8 oz)  Body mass index is 23.85 kg/m².    Intake/Output Summary (Last 24 hours) at 04/29/18 1545  Last data filed at 04/29/18 1400   Gross per 24 hour   Intake          1661.25 ml   Output                0 ml   Net          1661.25 ml      Physical Exam   Constitutional: He is oriented to person, place, and time. No distress.   HENT:   Head: Normocephalic and atraumatic.   Eyes: Conjunctivae are normal. Right eye exhibits no discharge. Left eye exhibits no discharge.   Neck: Neck supple.   Cardiovascular: Normal rate, regular rhythm and normal heart sounds.    Pulmonary/Chest: No stridor.   Slightly tachypnic.  Coarse BS bilaterally   Abdominal: Soft. Bowel sounds are normal.   Musculoskeletal: Normal range of motion. He exhibits no deformity.   Neurological: He is alert and oriented to person, place, and time.   Slurred speech   Skin: Skin is warm and dry. He is not diaphoretic.       Significant Labs:   BMP:     Recent Labs  Lab 04/28/18  0613 04/29/18  0401   * 208*    141   K 3.8 4.3    108   CO2 25 21*   BUN 21 29*   CREATININE 2.0* 2.4*   CALCIUM 8.9 8.8   MG 2.0  --      CBC:     Recent Labs  Lab 04/28/18  0613 04/29/18  0401   WBC 8.63 11.77   HGB 13.4* 13.4*   HCT 38.1* 38.1*    177        Significant Imaging: I have reviewed all pertinent imaging results/findings within the past 24 hours.

## 2018-04-29 NOTE — PROGRESS NOTES
Notified MD Florence of scheduled IV tylenol, tylenol ordered for temp of 101.4 yesterday, per MD ramirez to D/C.

## 2018-04-29 NOTE — ASSESSMENT & PLAN NOTE
MRI showing acute lacunar-type infarctions involving the superior right terrence and in the left cerebral peduncle as above.  Neurology consulted.  Permissive HTN.  Currently on ASA and Plavix.  PT/OT evaluation.  May need inpatient rehab.  Acute hypoxic respiratory failure (not POA)--secondary to episode of probable aspiration.  Moved to ICU.  Currently doing better on NRB.  Will try to wean down oxygen.  Started on Clindamycin.  ST already following.

## 2018-04-29 NOTE — PROGRESS NOTES
[facility] [place]    Patient Name: Jigar Michaels  MRN: [id number]  Admission Date: 4/26/2018  Hospital Length of Stay: 3 days  Code Status: Full Code   Attending Provider: Junior Chew MD  Primary Care Physician: Hannah Andino MD   Principal Problem:Acute CVA (cerebrovascular accident)    Subjective:     Interval History: 63 y/o male with medical Hx as listed below comes to ED for weakness on left side. Pt has Hx of stroke with residual left sided weakness but family reports worsening of motor deficits. His wife states that he is able to walk and feed himself at baseline but his speech is slurred. He is adherent to medications according to wife. Pt has already had at least two strokes.     -4/28/18: Pt with severe coughing when eating.    -4/29/18: Transferred to ICU after episode aspiration while eating.    Current Neurological Medications:     Current Facility-Administered Medications   Medication Dose Route Frequency Provider Last Rate Last Dose    0.9%  NaCl infusion   Intravenous Continuous Junior Chew MD 75 mL/hr at 04/29/18 1700      albuterol-ipratropium 2.5mg-0.5mg/3mL nebulizer solution 3 mL  3 mL Nebulization Q4H PRN Alexander Florence MD   3 mL at 04/29/18 1140    aspirin EC tablet 325 mg  325 mg Oral Daily Silvano Velasquez MD   Stopped at 04/29/18 0900    atorvastatin tablet 80 mg  80 mg Oral Daily Silvano Velasquez MD   Stopped at 04/29/18 0900    clindamycin 600 MG/50 ML D5W 600 mg/50 mL IVPB 600 mg  600 mg Intravenous Q8H Junior hCew  mL/hr at 04/29/18 1818 600 mg at 04/29/18 1818    clopidogrel tablet 75 mg  75 mg Oral Daily Silvano Velasquez MD   Stopped at 04/29/18 0900    dextrose 50% injection 12.5 g  12.5 g Intravenous PRN Silvano Velasquez MD        enoxaparin injection 30 mg  30 mg Subcutaneous Daily Alexander Florence MD   30 mg at 04/29/18 1800    glucagon (human recombinant) injection 1 mg  1 mg Intramuscular  PRN Silvano Velasquez MD        insulin aspart U-100 pen 1-10 Units  1-10 Units Subcutaneous Q6H PRN Silvano Velasquez MD   2 Units at 04/29/18 1214    labetalol injection 10 mg  10 mg Intravenous Q15 Min PRN Silvano Velasquez MD        mineral oil enema 1 enema  1 enema Rectal Daily PRN Silvano Velasquez MD        ondansetron disintegrating tablet 8 mg  8 mg Oral Q8H PRN Silvano Velasquez MD   8 mg at 04/28/18 0959    pantoprazole 40 mg in dextrose 5 % 100 mL infusion (ready to mix system)  40 mg Intravenous Daily Daphnie Guo MD   40 mg at 04/29/18 0916    polyethylene glycol packet 17 g  17 g Oral Daily Silvano Velasquez MD   Stopped at 04/29/18 0900    promethazine suppository 25 mg  25 mg Rectal Q6H PRN Silvano Velasquez MD        senna-docusate 8.6-50 mg per tablet 1 tablet  1 tablet Oral BID Silvano Velasquez MD   Stopped at 04/29/18 0900    sodium chloride 0.9% bolus 500 mL  500 mL Intravenous Continuous PRN Silvano Velasquez MD        sodium chloride 0.9% flush 3 mL  3 mL Intravenous Q8H Silvano Velasquez MD   3 mL at 04/29/18 1400       Review of Systems   Constitutional: Negative for fever.   HENT: Negative for trouble swallowing.    Eyes: Negative for photophobia.   Respiratory: Negative for stridor.    Cardiovascular: Negative for palpitations.   Gastrointestinal: Negative for abdominal pain.   Genitourinary: Negative for dysuria.   Musculoskeletal: Negative for back pain.   Neurological: Negative for headaches.   Psychiatric/Behavioral: Negative for confusion.     Objective:     Vital Signs (Most Recent):  Temp: 97.9 °F (36.6 °C) (04/29/18 1515)  Pulse: 79 (04/29/18 1730)  Resp: (!) 33 (04/29/18 1730)  BP: (!) 149/69 (04/29/18 1730)  SpO2: 95 % (04/29/18 1730) Vital Signs (24h Range):  Temp:  [97.8 °F (36.6 °C)-99.4 °F (37.4 °C)] 97.9 °F (36.6 °C)  Pulse:  [] 79  Resp:  [25-51] 33  SpO2:  [92 %-100 %] 95 %  BP: (118-161)/() 149/69     Weight: 65 kg (143 lb  4.8 oz)  Body mass index is 23.85 kg/m².    Physical Exam  Constitutional: He is oriented to person, place, and time. No distress.   HENT:   Head: Normocephalic.   Eyes: Right eye exhibits no discharge. Left eye exhibits no discharge.   Neck: Normal range of motion.   Cardiovascular: Normal rate and regular rhythm.    Pulmonary/Chest: Effort normal and breath sounds normal.   Abdominal: Soft. Bowel sounds are normal.   Musculoskeletal: He exhibits no edema.   Neurological: He is oriented to person, place, and time.   Skin: He is not diaphoretic.   Psychiatric: His speech is slurred.         NEUROLOGICAL EXAMINATION:      MENTAL STATUS   Oriented to person, place, and time.   Speech: slurred   Level of consciousness: alert     CRANIAL NERVES      CN III, IV, VI   Right pupil: Size: 2 mm. Shape: regular.   Left pupil: Size: 2 mm. Shape: regular.   Nystagmus: none   Ophthalmoparesis: none     CN V   Right facial sensation deficit: none  Left facial sensation deficit: none     CN VII   Right facial weakness: none  Left facial weakness: central     CN IX, X   Palate: asymmetric     CN XII   Tongue deviation: left     MOTOR EXAM        Left spastic hemiparesis           Right 4+/5            Significant Labs:   CBC:   Recent Labs  Lab 04/28/18  0613 04/29/18  0401   WBC 8.63 11.77   HGB 13.4* 13.4*   HCT 38.1* 38.1*    177     CMP:   Recent Labs  Lab 04/28/18  0613 04/29/18  0401   * 208*    141   K 3.8 4.3    108   CO2 25 21*   BUN 21 29*   CREATININE 2.0* 2.4*   CALCIUM 8.9 8.8   MG 2.0  --    PROT 6.0 6.2   ALBUMIN 2.9* 2.7*   BILITOT 0.5 0.7   ALKPHOS 65 60   AST 27 30   ALT 22 22   ANIONGAP 8 12   EGFRNONAA 34* 28*       Significant Imaging:   MRA  Extensive intracranial atheromatous disease is noted.  There is a focal high-grade stenosis involving the right A2 segment, significant stenoses involving the bilateral intracranial carotid arteries including the left cavernous artery, the right  cavernous artery in the right paraclinoid artery, significant focal stenosis with probable occlusion of the distal left vertebral artery near its junction with the right vertebral artery with segmental high-grade stenoses involving the entirety of the basilar artery.  Consider further evaluation with CTA.      Electronically signed by: Keila Arthur MD  Date: 04/28/2018  Time: 15:32    Assessment and Plan:     61 y/o male with acute stroke     1. Stroke: acute areas of infarction on posterior circulation. With his multiple previous strokes and now to more areas this suggests embolism. No Hx of atrial fibrillation but new HF.            -MRA head shows significant basilar stenosis among other findings. Will discuss with vascular neurology. CTA would be excellent to assess intracranial circulation but pt has abnormal renal function and will avoid use of IV contrast for now.           -ASA and statin for now.            -PT/OT/ST: inpatient rehab.        -NPO for now.    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 [I50.41] 04/27/2018 Yes    Hypertension [I10] 12/09/2012 Yes    Type 2 diabetes mellitus [E11.9] 12/09/2012 Yes     Chronic    CVA, old, hemiparesis [I69.359] 12/09/2012 Not Applicable    Dysphagia [R13.10] 12/08/2012 Yes      Problems Resolved During this Admission:    Diagnosis Date Noted Date Resolved POA       VTE Risk Mitigation         Ordered     enoxaparin injection 30 mg  Daily      04/29/18 1549     IP VTE LOW RISK PATIENT  Once      04/27/18 0248     Place sequential compression device  Until discontinued      04/27/18 0248          Lul Wylie MD  Neurology  [facility] [place]

## 2018-04-29 NOTE — PLAN OF CARE
Problem: Patient Care Overview  Goal: Plan of Care Review  Outcome: Ongoing (interventions implemented as appropriate)  Pt transferred as a rescue this evening d/t aspiration with dinner. Temp of 101.4 axillary, tylenol IV given x1. ST on monitor. Blood cultures drawn x2, abx started. NS at 75cc/hr started. Pt now NPO. No falls, injures or new skin breakdown this shift, precautions maintained for all.

## 2018-04-30 PROBLEM — T17.908A ASPIRATION INTO AIRWAY: Status: ACTIVE | Noted: 2018-01-01

## 2018-04-30 NOTE — SUBJECTIVE & OBJECTIVE
Interval History: comfortable on NC but gurgling     Review of Systems   Constitutional: Negative for activity change.     Objective:     Vital Signs (Most Recent):  Temp: 99.3 °F (37.4 °C) (04/30/18 0400)  Pulse: 105 (04/30/18 0600)  Resp: (!) 40 (04/30/18 0600)  BP: (!) 149/71 (04/30/18 0600)  SpO2: (!) 94 % (04/30/18 0600) Vital Signs (24h Range):  Temp:  [97.8 °F (36.6 °C)-99.3 °F (37.4 °C)] 99.3 °F (37.4 °C)  Pulse:  [] 105  Resp:  [22-58] 40  SpO2:  [91 %-100 %] 94 %  BP: (118-166)/() 149/71     Weight: 65 kg (143 lb 4.8 oz)  Body mass index is 23.85 kg/m².    Intake/Output Summary (Last 24 hours) at 04/30/18 0640  Last data filed at 04/30/18 0600   Gross per 24 hour   Intake             1875 ml   Output              730 ml   Net             1145 ml      Physical Exam   Constitutional: He appears well-developed and well-nourished.   HENT:   Head: Normocephalic and atraumatic.   Cardiovascular: Normal rate.    Pulmonary/Chest: Effort normal. He has rales.   Abdominal: He exhibits no distension.   Neurological: He is alert.   Vitals reviewed.      Significant Labs:   BMP:   Recent Labs  Lab 04/29/18 0401   *      K 4.3      CO2 21*   BUN 29*   CREATININE 2.4*   CALCIUM 8.8     CBC:   Recent Labs  Lab 04/29/18 0401   WBC 11.77   HGB 13.4*   HCT 38.1*          Significant Imaging

## 2018-04-30 NOTE — PLAN OF CARE
Problem: Physical Therapy Goal  Goal: Physical Therapy Goal  Goals to be met by: 18     Patient will increase functional independence with mobility by performin. Supine to sit with Stand-by Assistance  2. Rolling to Left and Right with Stand-by Assistance  3. Sit to stand transfer with Stand-by Assistance using QC  4. Bed to chair transfer with Stand-by Assistance using Quad Cane  5. Gait  x 50 feet with Stand-by Assistance using Quad Cane   6. Lower extremity exercise program x 20 reps per handout, with assistance as needed     Outcome: Ongoing (interventions implemented as appropriate)  Pt with limited verbal communication, appeared tired and required dep x 2 for functional mobility.

## 2018-04-30 NOTE — PROGRESS NOTES
" [facility]  Adult Nutrition  Progress Note    SUMMARY       Recommendations  Recommendation/Intervention:   1.  Rec TF of Diabetisource initiated @ 10 ml/hr and advanced 10 ml q 4 hrs to goal rate of 60 ml/hr.  -Fluid flushes for normal fluid intake: 135 ml q 6 hrs  -Hold TF for n/v/abd discomfort; Residuals >500 ml; HOB >30  -TF to provide: 1728 kcal, 86 g pro, 1178 ml fluid  2. Advance diet per SLP to 2000 ADA/Cardiac  3. RD to monitor    Goals: Initiate nutrition within 72 hours  Nutrition Goal Status: new  Communication of RD Recs: reviewed with RN    Discharge plan: No discharge plan at this time.    Reason for Assessment  Reason for Assessment: RD follow-up  Diagnosis:  (Acute CVA)  Relevant Medical History: DM, Stroke, HTN  General Information Comments: Awaiting SLP eval. Pt hx of dysphagia noted. Pt NPO x 2 days (susepcted aspiration event). Pt appears nourished.  Addendum: Update on status: SLP recs NPO.    Nutrition Risk Screen  Nutrition Risk Screen: no indicators present    Nutrition/Diet History  Food Preferences: JEN Zoroastrian food preferences.   Do you have any cultural, spiritual, Zoroastrian conflicts, given your current situation?: None reported  Factors Affecting Nutritional Intake: NPO    Anthropometrics  Temp: 98 °F (36.7 °C)  Height Method: Measured  Height: 5' 5" (165.1 cm)  Height (inches): 65 in  Weight Method: Bed Scale  Weight: 65 kg (143 lb 4.8 oz)  Weight (lb): 143.3 lb  Ideal Body Weight (IBW), Male: 136 lb  % Ideal Body Weight, Male (lb): 105.37 lb  BMI (Calculated): 23.9  BMI Grade: 18.5-24.9 - normal    Lab/Procedures/Meds  Pertinent Labs Reviewed: reviewed  Pertinent Medications Reviewed: reviewed    Physical Findings/Assessment  Overall Physical Appearance: nourished  Oral/Mouth Cavity: WDL  Skin: intact (Luigi-11)    Estimated/Assessed Needs  Weight Used For Calorie Calculations: 65 kg (143 lb 4.8 oz)  Energy Calorie Requirements (kcal): 1625 (25kcal/kg)  Energy " Need Method: Kcal/kg  Protein Requirements: 65g (1g/kg)  Weight Used For Protein Calculations: 65 kg (143 lb 4.8 oz)     Fluid Need Method: RDA Method  RDA Method (mL): 1625    CHO: 200g    Nutrition Prescription Ordered  Current Diet Order: NPO    Evaluation of Received Nutrient/Fluid Intake  IV Fluid (mL): 1800  I/O: 563/320  Energy Calories Required: not meeting needs  Protein Required: not meeting needs  Fluid Required: meeting needs  % Intake of Estimated Energy Needs:0%  % Meal Intake: NPO    Nutrition Risk  Level of Risk/Frequency of Follow-up:  (2 x week)     Assessment and Plan  Nutrition Problem  Inadequate oral intake    Related to (etiology):   Dysphagia     Signs and Symptoms (as evidenced by):   NPO    Interventions/Recommendations (treatment strategy):  See reccs    Nutrition Diagnosis Status:   New    Service: Nutrition    Monitor and Evaluation  Food and Nutrient Intake: energy intake, food and beverage intake  Food and Nutrient Adminstration: diet order  Knowledge/Beliefs/Attitudes: food and nutrition knowledge/skill, beliefs and attitudes  Physical Activity and Function: nutrition-related ADLs and IADLs  Anthropometric Measurements: weight  Biochemical Data, Medical Tests and Procedures: electrolyte and renal panel  Nutrition-Focused Physical Findings: overall appearance     Nutrition Follow-Up  RD Follow-up?: Yes    I certify that I directed the dietetic intern in service delivery and guided them using my skilled judgment. As the cosigning dietitian, I have reviewed the dietetic interns documentation and am responsible for the treatment, assessment, and plan.    Lida Driver, Dietetic Intern

## 2018-04-30 NOTE — PROGRESS NOTES
[facility]  Cardiology  Progress Note    Patient Name: Jigar Michaels  MRN: [id number]  Admission Date: 4/26/2018  Hospital Length of Stay: 4 days  Code Status: Full Code   Attending Physician: Junior Chew MD   Primary Care Physician: Hannah Andino MD  Expected Discharge Date:   Principal Problem:Acute CVA (cerebrovascular accident)    Subjective:     Hospital Course:   4-27: Newly diagnosed cardiomyopathy admitted with acute on chronic CVA  4-29: Transferred to ICU for possible aspiration    Interval History: Looks better today and minimally responsive    Telemetry: Normal sinus rhythm    Review of Systems   All other systems reviewed and are negative.    Objective:     Vital Signs (Most Recent):  Temp: 98 °F (36.7 °C) (04/30/18 0730)  Pulse: (!) 112 (04/30/18 0830)  Resp: (!) 29 (04/30/18 0830)  BP: (!) 154/72 (04/30/18 0830)  SpO2: 97 % (04/30/18 0830) Vital Signs (24h Range):  Temp:  [97.8 °F (36.6 °C)-99.3 °F (37.4 °C)] 98 °F (36.7 °C)  Pulse:  [] 112  Resp:  [22-58] 29  SpO2:  [91 %-100 %] 97 %  BP: (118-166)/(58-78) 154/72     Weight: 65 kg (143 lb 4.8 oz)  Body mass index is 23.85 kg/m².     SpO2: 97 %  O2 Device (Oxygen Therapy): nasal cannula      Intake/Output Summary (Last 24 hours) at 04/30/18 0915  Last data filed at 04/30/18 0800   Gross per 24 hour   Intake             1820 ml   Output              805 ml   Net             1015 ml       Lines/Drains/Airways     Drain                 Urethral Catheter 04/29/18 1620 16 Fr. less than 1 day          Peripheral Intravenous Line                 Peripheral IV - Single Lumen 04/26/18 2035 Right Hand 3 days         Peripheral IV - Single Lumen 04/28/18 1847 Right Forearm 1 day                Physical Exam   Constitutional: No distress.   Cardiovascular: Normal rate and regular rhythm.    Pulmonary/Chest: Effort normal and breath sounds normal.   Musculoskeletal: He exhibits no edema.   Neurological: He is alert.       Significant  Labs:   BMP:   Recent Labs  Lab 04/29/18  0401   *      K 4.3      CO2 21*   BUN 29*   CREATININE 2.4*   CALCIUM 8.8    and CBC   Recent Labs  Lab 04/29/18  0401   WBC 11.77   HGB 13.4*   HCT 38.1*          Significant Imaging: Echocardiogram:   2D echo with color flow doppler:   Results for orders placed or performed during the hospital encounter of 04/26/18   2D echo with color flow doppler   Result Value Ref Range    EF 35 (A) 55 - 65    Mitral Valve Regurgitation TRIVIAL     Diastolic Dysfunction Yes (A)     Aortic Valve Regurgitation MILD TO MODERATE (A)     Est. 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    Newly diagnosed  Add beta blocker  No signs of volume overload  We'll discuss with family regarding further ischemic workup NST versus R/LHC, likely NST when stable with renal insufficiency prohibiting for cardiac catheterization        Type 2 diabetes mellitus    Per IM        Hypertension                 VTE Risk Mitigation         Ordered     enoxaparin injection 30 mg  Daily      04/29/18 1549     IP VTE LOW RISK PATIENT  Once      04/27/18 0248     Place sequential compression device  Until discontinued      04/27/18 0248          Jayson Pringle MD  Cardiology  [facility]

## 2018-04-30 NOTE — PT/OT/SLP PROGRESS
Speech Language Pathology Treatment    Patient Name:  Jigar Michaels   MRN:  [id number]  Admitting Diagnosis: Acute CVA (cerebrovascular accident)    Recommendations:                 General Recommendations:  Dysphagia therapy and Speech language evaluation  Diet recommendations:  NPO, Liquid Diet Level: NPO   Aspiration Precautions: Frequent oral care and Standard aspiration precautions   General Precautions: Standard, aspiration, fall  Communication strategies: pt does not speak english--severe communication deficits noted    Subjective     RN reports pt exhibiting significant dcr and mgmt of secretions requiring frequent deep suctioning. Pt unable to cough secretions into oral cavity.    Pain/Comfort:  · Pain Rating 1: 0/10    Objective:     Has the patient been evaluated by SLP for swallowing?   Yes  Keep patient NPO? Yes   Current Respiratory Status: nasal cannula      Pt exhibiting dcr alertness/participation. Pt exhibiting poor secretion mgmt--unable to cough secretions into oral cavity. SLP repositioned pt to 90 degree angle in bed. Pt exhibiting severe oral motor weakness and dcr coordination/ROM of oral musculature. Pt given x1 small tsp of thin liquids and r8byvey tsp of purees. Weak coughing noted during swallow and wet/gurgly vocal quality noted after swallow. Pt presenting with several clinical indicators of high risk of aspiration at this time. Pt is unsafe for ALL po intake.    Assessment:     Jigar Michaels is a 62 y.o. male with an SLP diagnosis of severe oropharyngeal Dysphagia.  He presents with several clinical indicators of high risk of aspiration including poor secretion mgmt, weak coughing, coughing during swallow, and wet/gurgly vocal quality noted after swallow. Pt is unsafe for all PO intake at this time and should remain NPO with alternative means of nutrition/hydration/medication. ST will continue to follow.    Goals:    SLP Goals        Problem: SLP Goal    Goal Priority Disciplines Outcome   SLP  Goal     SLP Ongoing (interventions implemented as appropriate)   Description:  SLP Short Term Goals:  1. Patient will successfully participate in ongoing clinical swallow evaluation and tolerate po trials with no overt s/s of aspiration.   2. Patient will tolerate dental soft diet w/ thin liquids with no overt s/s of aspiration. --DISCONTINUE  3. Patient will participate in full language/speech/cognitive assessment in order to obtain baseline.                     Plan:     · Patient to be seen:  3 x/week   · Plan of Care expires:  05/26/18  · Plan of Care reviewed with:  other (see comments) (RN)   · SLP Follow-Up:  Yes       Discharge recommendations:  rehabilitation facility   Barriers to Discharge:  nutritional status    Time Tracking:     SLP Treatment Date:   04/30/18  Speech Start Time:  1305  Speech Stop Time:  1320     Speech Total Time (min):  15 min    Billable Minutes: Treatment Swallowing Dysfunction 15 minutes    Gris Puentes CCC-SLP  04/30/2018

## 2018-04-30 NOTE — PLAN OF CARE
Problem: Patient Care Overview  Goal: Plan of Care Review  Outcome: Ongoing (interventions implemented as appropriate)  Pt remains in the ICU this shift. Remains NPO, NRB weaned to 12L high flow NC, tolerating well. Pt still requires frequent suctions, thick white-yellow secretions. Cuevas placed today d/t incontinence, UA and urine culture done post cuevas placement. No falls, injuries, or new skin breakdown, precautions maintained for all.

## 2018-04-30 NOTE — ASSESSMENT & PLAN NOTE
Choked on food being fed to him by wife on 4/28.  Requiring 55% mask initially. Now down to NC. Still gurgling (audible) Started on Clinda. CXR- no acute change on 4/28. Will repeat another portable today.  Blood cultures are negative.  CBC pending

## 2018-04-30 NOTE — PT/OT/SLP PROGRESS
Physical Therapy Treatment    Patient Name:  Jigar Michaels   MRN:  [id number]    Recommendations:     Discharge Recommendations:  rehabilitation facility   Discharge Equipment Recommendations:  (TBD)   Barriers to discharge: Pt with decreased mobility.    Assessment:     Jigar Michaels is a 62 y.o. male admitted with a medical diagnosis of Acute CVA (cerebrovascular accident).  He presents with the following impairments/functional limitations:  weakness, impaired endurance, impaired self care skills, impaired functional mobilty, gait instability, impaired balance, decreased coordination, decreased upper extremity function, decreased lower extremity function, decreased safety awareness, abnormal tone, decreased ROM, impaired coordination, impaired fine motor.    Rehab Prognosis:  fair; patient would benefit from acute skilled PT services to address these deficits and reach maximum level of function.      Recent Surgery: * No surgery found *      Plan:     During this hospitalization, patient to be seen daily to address the above listed problems via gait training, therapeutic activities, therapeutic exercises  · Plan of Care Expires:  05/11/18   Plan of Care Reviewed with: patient, spouse    Subjective     Communicated with nurse Wei prior to session.  Patient found in bed upon PT entry to room, agreeable to treatment.      Chief Complaint: Spouse reported that pt kept asking for Ensure because he's hungry.  Patient comments/goals: Pt did not state.   Pain/Comfort:  · Pain Rating 1:  (Pt pointed to his throat, spouse reported pain from coughing.)      Objective:     Patient found with: blood pressure cuff, cuevas catheter, oxygen 15L, peripheral IV, pulse ox (continuous), telemetry     General Precautions: Standard, fall   Orthopedic Precautions:N/A     Functional Mobility:  Pt appeared tired, limited verbal communication today.  Pt able to follow simple commands, however unable to assist with functional mobility.    · Bed  Mobility:     · Rolling Right: dependence  · Scooting: dependence and of 2 persons  · Bridging: dependence and of 2 persons  · Supine to Sit: dependence and of 2 persons  · Sit to Supine: dependence and of 2 persons  · Transfers:     · Sit to Stand:  dependence and of 2 persons with no AD and rolling walker x 3 trials.  Pt with forward flexed posture, required assistance with trunk, hip, and knee extension.    · Balance: Pt with fair- sit balance and poor stand balance.  While sitting EOB, pt with R sided LOB.        AM-PAC 6 CLICK MOBILITY  Turning over in bed (including adjusting bedclothes, sheets and blankets)?: 2  Sitting down on and standing up from a chair with arms (e.g., wheelchair, bedside commode, etc.): 2  Moving from lying on back to sitting on the side of the bed?: 2  Moving to and from a bed to a chair (including a wheelchair)?: 1  Need to walk in hospital room?: 1  Climbing 3-5 steps with a railing?: 1  Total Score: 9       Therapeutic Activities and Exercises:  PROM LLE seated in all available planes.      Balance Training  Static Standing:  Patient performed static standing on level surface  using rolling walker and no AD with Dependent Assistance and maximal verbal cues for trunk, hip, and knee extension.     Dynamic Sitting:  Patient performed static sitting on foam mat with Maximal Assistance and maximal verbal cues during minimal excursions reaching with RUE.    Patient left right sidelying and HOB elevated and BUE elevated on pillows and B heel offloaded with all lines intact, call button in reach, nurse Sanjuana notified and spouse present.    GOALS:    Physical Therapy Goals        Problem: Physical Therapy Goal    Goal Priority Disciplines Outcome Goal Variances Interventions   Physical Therapy Goal     PT/OT, PT Ongoing (interventions implemented as appropriate)     Description:  Goals to be met by: 5/11/18     Patient will increase functional independence with mobility by  performin. Supine to sit with Stand-by Assistance  2. Rolling to Left and Right with Stand-by Assistance  3. Sit to stand transfer with Stand-by Assistance using QC  4. Bed to chair transfer with Stand-by Assistance using Quad Cane  5. Gait  x 50 feet with Stand-by Assistance using Quad Cane   6. Lower extremity exercise program x 20 reps per handout, with assistance as needed                      Time Tracking:     PT Received On: 18  PT Start Time: 1040     PT Stop Time: 1107  PT Total Time (min): 27 min     Billable Minutes: Therapeutic Activity 14 min co-tx with OT    Treatment Type: Treatment  PT/PTA: PT     PTA Visit Number: 0     Stephanie POLO Resendez, PT  2018

## 2018-04-30 NOTE — PROGRESS NOTES
[facility] [place]  Neurology  Progress Note    Patient Name: Jigar Michaels  MRN: [id number]  Admission Date: 4/26/2018  Hospital Length of Stay: 4 days  Code Status: Full Code   Attending Provider: Junior Chew MD  Primary Care Physician: Hannah Andino MD   Principal Problem:Acute CVA (cerebrovascular accident)    Subjective:     Interval History: 63 y/o male with medical Hx as listed below comes to ED for weakness on left side. Pt has Hx of stroke with residual left sided weakness but family reports worsening of motor deficits. His wife states that he is able to walk and feed himself at baseline but his speech is slurred. He is adherent to medications according to wife. Pt has already had at least two strokes.     -4/28/18: Pt with severe coughing when eating.     -4/29/18: Transferred to ICU after episode aspiration while eating.    -4/30/18: Pt is more dysarthric.     Current Neurological Medications:     Current Facility-Administered Medications   Medication Dose Route Frequency Provider Last Rate Last Dose    0.9%  NaCl infusion   Intravenous Continuous Lul Wylie  mL/hr at 04/30/18 1235      albuterol-ipratropium 2.5mg-0.5mg/3mL nebulizer solution 3 mL  3 mL Nebulization Q4H PRN Alexander Florence MD   3 mL at 04/29/18 1140    aspirin suppository 300 mg  300 mg Rectal Daily Lul Wylie MD        atorvastatin tablet 80 mg  80 mg Oral Daily Silvano Velasquez MD   Stopped at 04/29/18 0900    clindamycin 600 MG/50 ML D5W 600 mg/50 mL IVPB 600 mg  600 mg Intravenous Q8H Junior Chew  mL/hr at 04/30/18 0945 600 mg at 04/30/18 0945    clopidogrel tablet 75 mg  75 mg Oral Daily Silvano Velasquez MD   Stopped at 04/29/18 0900    dextrose 50% injection 12.5 g  12.5 g Intravenous PRN Silvano Velasquez MD        enoxaparin injection 30 mg  30 mg Subcutaneous Daily Alexander Florence MD   30 mg at 04/29/18 1800    glucagon (human recombinant) injection 1 mg  1 mg  Intramuscular PRN Silvano Velasquez MD        insulin aspart U-100 pen 1-10 Units  1-10 Units Subcutaneous Q6H PRN Silvano Velasquez MD   2 Units at 04/30/18 1132    labetalol injection 10 mg  10 mg Intravenous Q15 Min PRN Silvano Velasquez MD        mineral oil enema 1 enema  1 enema Rectal Daily PRN Silvano Velasquez MD        ondansetron disintegrating tablet 8 mg  8 mg Oral Q8H PRN Silvano Velasquez MD   8 mg at 04/28/18 0959    pantoprazole 40 mg in dextrose 5 % 100 mL infusion (ready to mix system)  40 mg Intravenous Daily Daphnie Guo MD   40 mg at 04/30/18 0841    polyethylene glycol packet 17 g  17 g Oral Daily Silvano Velasquez MD   Stopped at 04/29/18 0900    promethazine suppository 25 mg  25 mg Rectal Q6H PRN Silvano Velasquez MD        senna-docusate 8.6-50 mg per tablet 1 tablet  1 tablet Oral BID Silvano Velasquez MD   Stopped at 04/29/18 0900    sodium chloride 0.9% bolus 500 mL  500 mL Intravenous Continuous PRN Silvano Velasquez MD        sodium chloride 0.9% flush 3 mL  3 mL Intravenous Q8H Silvano Velasquez MD   3 mL at 04/30/18 0700       Review of Systems    Severely dysarthric but answers yes or no  Constitutional: Negative for fever.   HENT: Negative for trouble swallowing.    Eyes: Negative for photophobia.   Respiratory: Negative for stridor.    Cardiovascular: Negative for palpitations.   Gastrointestinal: Negative for abdominal pain.   Genitourinary: Negative for dysuria.   Musculoskeletal: Negative for back pain.   Neurological: Negative for headaches.   Psychiatric/Behavioral: Negative for confusion.        Objective:     Vital Signs (Most Recent):  Temp: 99.3 °F (37.4 °C) (04/30/18 1200)  Pulse: 98 (04/30/18 1200)  Resp: (!) 35 (04/30/18 1200)  BP: (!) 144/69 (04/30/18 1200)  SpO2: (!) 92 % (04/30/18 1200) Vital Signs (24h Range):  Temp:  [97.8 °F (36.6 °C)-99.5 °F (37.5 °C)] 99.3 °F (37.4 °C)  Pulse:  [] 98  Resp:  [22-58] 35  SpO2:  [91 %-100  %] 92 %  BP: (132-166)/(63-78) 144/69     Weight: 65 kg (143 lb 4.8 oz)  Body mass index is 23.85 kg/m².    Physical Exam  Constitutional: He is oriented to person, place, and time. No distress.   HENT:   Head: Normocephalic.   Eyes: Right eye exhibits no discharge. Left eye exhibits no discharge.   Neck: Normal range of motion.   Cardiovascular: Normal rate and regular rhythm.    Pulmonary/Chest: Effort normal and breath sounds normal.   Abdominal: Soft. Bowel sounds are normal.   Musculoskeletal: He exhibits no edema.   Neurological: He is oriented to person, place, and time.   Skin: He is not diaphoretic.   Psychiatric: His speech is severely dysarthric         NEUROLOGICAL EXAMINATION:      MENTAL STATUS   Oriented to person, place, and time.   Speech: slurred   Level of consciousness: alert     CRANIAL NERVES      CN III, IV, VI   Right pupil: Size: 2 mm. Shape: regular.   Left pupil: Size: 2 mm. Shape: regular.   Nystagmus: none   Ophthalmoparesis: none     CN V   Right facial sensation deficit: none  Left facial sensation deficit: none     CN VII   Right facial weakness: none  Left facial weakness: central     CN IX, X   Palate: asymmetric     CN XII   Tongue deviation: left     MOTOR EXAM        Left spastic hemiparesis           Right 4+/5          Significant Labs:   CBC:   Recent Labs  Lab 04/29/18  0401 04/30/18  0816   WBC 11.77 9.30   HGB 13.4* 12.6*   HCT 38.1* 35.8*    195     CMP:   Recent Labs  Lab 04/29/18  0401 04/30/18  0816   * 146*    141   K 4.3 4.1    110   CO2 21* 17*   BUN 29* 30*   CREATININE 2.4* 2.2*   CALCIUM 8.8 8.8   PROT 6.2  --    ALBUMIN 2.7*  --    BILITOT 0.7  --    ALKPHOS 60  --    AST 30  --    ALT 22  --    ANIONGAP 12 14   EGFRNONAA 28* 31*       Significant Imaging:  MRA  Extensive intracranial atheromatous disease is noted.  There is a focal high-grade stenosis involving the right A2 segment, significant stenoses involving the bilateral  intracranial carotid arteries including the left cavernous artery, the right cavernous artery in the right paraclinoid artery, significant focal stenosis with probable occlusion of the distal left vertebral artery near its junction with the right vertebral artery with segmental high-grade stenoses involving the entirety of the basilar artery.  Consider further evaluation with CTA.      Electronically signed by: Keila Arthur MD  Date: 04/28/2018  Time: 15:32    Assessment and Plan:     61 y/o male with acute stroke     1. Stroke: acute areas of infarction on posterior circulation. With his multiple previous strokes and now to more areas this suggests embolism. No Hx of atrial fibrillation but new HF.            -MRA head shows significant basilar stenosis among other findings likely to atheromatous disease. This can make him prone to other strokes and decompensation if brainstem is involved more. Note that this pt has multiple co-morbidities including HF and his situation may change critically.For now high dose statin and dual antiplatelets. Will hold on OAC for now given strokes likely result of intracranial arterial disease.   Pt is NPO. Will change ASA to rectal route. May need NGT soon to administer his oral medication as pt had severe dysphagia. ST eval.   -Will attempt to increase SBP to 160-180's due to possible stroke in evolution. NaCl 500 ml bolus and ncreasing rate to 150 cc/hr but likely to be for a short period of time as need to watch for fluid overload given his HF. Pt is in a delicate condition.   -Will get CXR due to tachypnea.            -Inpatient rehab.    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 [I50.41] 04/27/2018 Yes    Hypertension [I10] 12/09/2012 Yes    Type 2 diabetes mellitus [E11.9] 12/09/2012 Yes     Chronic    CVA, old, hemiparesis [I69.359] 12/09/2012 Not Applicable    Dysphagia [R13.10] 12/08/2012 Yes      Problems Resolved During this Admission:    Diagnosis Date Noted Date Resolved POA       VTE Risk Mitigation         Ordered     enoxaparin injection 30 mg  Daily      04/29/18 1549     IP VTE LOW RISK PATIENT  Once      04/27/18 0248     Place sequential compression device  Until discontinued      04/27/18 0248          Lul Wylie MD  Neurology  [facility]

## 2018-04-30 NOTE — PT/OT/SLP PROGRESS
Occupational Therapy   Treatment    Name: Jigar Michaels  MRN: [id number]  Admitting Diagnosis:  Acute CVA (cerebrovascular accident)       Recommendations:     Discharge Recommendations: rehabilitation facility  Discharge Equipment Recommendations:   (TBD)  Barriers to discharge:  Decreased caregiver support    Subjective     Communicated with:Sanjuana little prior to session.  Pain/Comfort:  Pain Rating 1: 1/10 (c/o throat pain 2* coughing)    Patients cultural, spiritual, [employer] conflicts given the current situation: no    Objective:     Patient found with: blood pressure cuff, cuevas catheter, oxygen, peripheral IV, pulse ox (continuous), telemetry    General Precautions: Standard, fall   Orthopedic Precautions:N/A   Braces: N/A     Occupational Performance:    Bed Mobility:    · Patient completed Rolling/Turning to Right with dependent  · Patient completed Scooting/Bridging with dependent and 2 persons  · Patient completed Supine to Sit with dependent  · Patient completed Sit to Supine with 2 persons     Functional Mobility/Transfers:  · Patient completed Sit <> Stand Transfer with dependence and of 2 persons  with  no assistive device       Activities of Daily Living:  · Feeding:  NPO    · UB Dressing: dependence      Patient left right sidelying with all lines intact, call button in reach, nurseSanjuana notified and spouse present    [facility] 6 Click:  [facility] Total Score: 7    Treatment & Education:  The patient sat EOB ~20 min with fair- sitting balance. The patient received PROM to LUE in all plains. The patient was rigid and resisted ROM but responded to slow, passive stretch of shldr and elbow. The LUE was placed in weight bearing while on EOB. The patient required mod/max assist to perform reaching activities with the RUE. Education:    Assessment:     Jigar Michaels is a 62 y.o. male with a medical diagnosis of Acute CVA (cerebrovascular accident).  Performance deficits affecting function are weakness, impaired  endurance, impaired sensation, impaired self care skills, impaired functional mobilty, gait instability, impaired balance, decreased coordination, decreased upper extremity function, decreased lower extremity function, decreased safety awareness, pain, decreased ROM, impaired coordination, impaired fine motor, impaired joint extensibility, impaired cardiopulmonary response to activity.      Rehab Prognosis:  fair; patient would benefit from acute skilled OT services to address these deficits and reach maximum level of function.       Plan:     Patient to be seen 5 x/week to address the above listed problems via self-care/home management, therapeutic activities, therapeutic exercises  · Plan of Care Expires: 05/18/18  · Plan of Care Reviewed with: patient, spouse    This Plan of care has been discussed with the patient who was involved in its development and understands and is in agreement with the identified goals and treatment plan    GOALS:    Occupational Therapy Goals        Problem: Occupational Therapy Goal    Goal Priority Disciplines Outcome Interventions   Occupational Therapy Goal     OT, PT/OT Ongoing (interventions implemented as appropriate)    Description:  Goals to be met by: 05/18/18     Patient will increase functional independence with ADLs by performing:    UE Dressing with Set-up Assistance.  LE Dressing with Minimal Assistance.  Grooming while standing at sink with Minimal Assistance.  Toileting from bedside commode with Minimal Assistance for hygiene and clothing management.   Supine to sit with Stand-by Assistance.  Toilet transfer to bedside commode with Contact Guard Assistance.                      Time Tracking:     OT Date of Treatment: 04/30/18  OT Start Time: 1039  OT Stop Time: 1107  OT Total Time (min): 28 min    Billable Minutes:Therapeutic Activity 14  Total Time 24 (co-tx with PT)    Shreya Murray OT  4/30/2018

## 2018-04-30 NOTE — PLAN OF CARE
Recommendations  Recommendation/Intervention:   1.  Rec TF of Diabetisource initiated @ 10 ml/hr and advanced 10 ml q 4 hrs to goal rate of 60 ml/hr.  -Fluid flushes for normal fluid intake: 135 ml q 6 hrs  -Hold TF for n/v/abd discomfort; Residuals >500 ml; HOB >30  -TF to provide: 1728 kcal, 86 g pro, 1178 ml fluid  2. Advance diet per SLP to 2000 ADA/Cardiac  3. RD to monitor     Goals: Initiate nutrition within 72 hours  Nutrition Goal Status: new  Communication of RD Recs: reviewed with RN     Discharge plan: No discharge plan at this time.

## 2018-04-30 NOTE — PROGRESS NOTES
1745 - pts breathing more labored, grunting, dyspenic. Resp called for breathing treatment.     1830 - Pt lethargic, difficult to arouse, Dr Jenkins paged, orders noted for ABG.     1840 - Resp therapist called ABG results to Dr Jenkins, will place pt on BiPAP.

## 2018-04-30 NOTE — PLAN OF CARE
04/30/18 1136   Discharge Reassessment   Assessment Type Discharge Planning Reassessment   Provided patient/caregiver education on the expected discharge date and the discharge plan No   Do you have any problems affording any of your prescribed medications? No   Discharge Plan A Rehab   Discharge Plan B Home with family;Home Health   Patient choice form signed by patient/caregiver No   Can the patient answer the patient profile reliably? No, cognitively impaired   How does the patient rate their overall health at the present time? Poor   Describe the patient's ability to walk at the present time. Does not walk or unable to take any steps at all  (ICU at this time)   How often would a person be available to care for the patient? Often   Number of comorbid conditions (as recorded on the chart) Four   During the past month, has the patient often been bothered by feeling down, depressed or hopeless? No  (record)   During the past month, has the patient often been bothered by little interest or pleasure in doing things? No  (record)   Patient transferred to ICU for possible aspiration on 4/28. RO reviewed chart, met with Dr Chew in bed huddle. Patient will have swallow evaluation. RO also met with Shawnee from  inpatient rehab who came to review patient as received referral on 4/27. Shawnee informs met with patient and his wife in room, provided written information regarding  inpatient rehab. Shawnee will continue to follow with patient as patient progresses. RO will follow in ICU and assist as needed.

## 2018-04-30 NOTE — PROGRESS NOTES
[facility] Hospital Medicine  Progress Note    Patient Name: Jigar Michales  MRN: [id number]  Patient Class: IP- Inpatient   Admission Date: 4/26/2018  Length of Stay: 4 days  Attending Physician: Junior Chew MD  Primary Care Provider: Hannah Andino MD        Subjective:     Principal Problem:Acute CVA (cerebrovascular accident)    HPI:  Jigar Michaels is a 62 y.o. male that (in part)  has a past medical history of Diabetes mellitus; Hypertension; and Stroke.  Presents to [facility] Emergency Department complaining of generalized weakness.  Subacute onset 1 week ago with progressive worsening.  Last known to be normal several days ago.  Was found to be significantly worse this morning by the patient's family member reports significant worsening of the left upper and lower extremity chronic weakness from a previous stroke.  He is unable to communicate effectively and is coughing, which may be consistent with aspiration.  Denies fever, chills, or recent infection.  No recent hospitalizations.  No report of syncope or collapse.  Significantly decreased oral intake over the last few days.  Patient was refusing to come to the Hospital prior to this.  Unfortunately due to the current condition of the patient remained of the history is limited    In the emergency department routine laboratory studies, CT of the head, and a consultation to tele-neurology were performed.  There is concern for acute CVA.  He is being admitted for stroke workup and neurology consultation.  MRI of the morning.    Hospital medicine has been asked to admit for further evaluation and treatment.     Hospital Course:  MRI confirms acute CVA with concern for possible embolic cause. ECHO showed new CHF, therefore, Cardiology consulted.  Neurology consulted. Continue permissive HTN and will likely need rehab for placement. Will consult social service to begin discharge planning with rehab placement.  Patient  apparently aspirated after being fed some meat by wife on 4/28.  Became more hypoxic and transferred to ICU.  Placed NPO and started on NRB mask.     Interval History: comfortable on NC but gurgling     Review of Systems   Constitutional: Negative for activity change.     Objective:     Vital Signs (Most Recent):  Temp: 99.3 °F (37.4 °C) (04/30/18 0400)  Pulse: 105 (04/30/18 0600)  Resp: (!) 40 (04/30/18 0600)  BP: (!) 149/71 (04/30/18 0600)  SpO2: (!) 94 % (04/30/18 0600) Vital Signs (24h Range):  Temp:  [97.8 °F (36.6 °C)-99.3 °F (37.4 °C)] 99.3 °F (37.4 °C)  Pulse:  [] 105  Resp:  [22-58] 40  SpO2:  [91 %-100 %] 94 %  BP: (118-166)/() 149/71     Weight: 65 kg (143 lb 4.8 oz)  Body mass index is 23.85 kg/m².    Intake/Output Summary (Last 24 hours) at 04/30/18 0640  Last data filed at 04/30/18 0600   Gross per 24 hour   Intake             1875 ml   Output              730 ml   Net             1145 ml      Physical Exam   Constitutional: He appears well-developed and well-nourished.   HENT:   Head: Normocephalic and atraumatic.   Cardiovascular: Normal rate.    Pulmonary/Chest: Effort normal. He has rales.   Abdominal: He exhibits no distension.   Neurological: He is alert.   Vitals reviewed.      Significant Labs:   BMP:   Recent Labs  Lab 04/29/18 0401   *      K 4.3      CO2 21*   BUN 29*   CREATININE 2.4*   CALCIUM 8.8     CBC:   Recent Labs  Lab 04/29/18 0401   WBC 11.77   HGB 13.4*   HCT 38.1*          Significant Imaging    Assessment/Plan:      * Acute CVA (cerebrovascular accident)    MRI showing acute lacunar-type infarctions involving the superior right terrence and in the left cerebral peduncle as above.  Neurology consulted.  Permissive HTN.  Currently on ASA and Plavix.  PT/OT evaluation.  May need inpatient rehab.  Acute hypoxic respiratory failure (not POA)--secondary to episode of probable aspiration.  Moved to ICU.  Currently doing better on NRB.  Will try to  wean down oxygen.  Started on Clindamycin.  ST already following.  BP in reasonable range for now.           Aspiration into airway    Choked on food being fed to him by wife on 4/28.  Requiring 55% mask initially. Now down to NC. Still gurgling (audible) Started on Clinda. CXR- no acute change on 4/28. Will repeat another portable today.  Blood cultures are negative.  CBC pending          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    Echo showing EF of 35% with diastolic dysfunction.  Cardiology consulted.          Acute renal failure    Last Creat we have in record is from 2012.  May have underlying CKD secondary to HTN and DM (but unable to determine).  Stable.  Continue to monitor  BMP pending .          Hyperglycemia due to type 2 diabetes mellitus              CVA, old, hemiparesis              Type 2 diabetes mellitus    Uncontrolled with hyperglycemia.  Elevated HgA1c.  Insulin sliding scale.          Hypertension    Permissive HTN for now.          Dysphagia    Apparent episode of aspiration 4/28.  ST already following.          VTE Risk Mitigation         Ordered     enoxaparin injection 30 mg  Daily      04/29/18 1549     IP VTE LOW RISK PATIENT  Once      04/27/18 0248     Place sequential compression device  Until discontinued      04/27/18 0248          Critical care time spent on the evaluation and treatment of severe organ dysfunction, review of pertinent labs and imaging studies, discussions with consulting providers and discussions with patient/family: 25  Minutes.    PT/OT recommend inpatient rehab. Will watch in unit today. Speech following. Hopefully to the floor tomorrow.  Continue PT/OT.     Junior Jenkins MD  Department of Hospital Medicine   [facility]

## 2018-04-30 NOTE — SUBJECTIVE & OBJECTIVE
Interval History: Looks better today and minimally responsive    Telemetry: Normal sinus rhythm    Review of Systems   All other systems reviewed and are negative.    Objective:     Vital Signs (Most Recent):  Temp: 98 °F (36.7 °C) (04/30/18 0730)  Pulse: (!) 112 (04/30/18 0830)  Resp: (!) 29 (04/30/18 0830)  BP: (!) 154/72 (04/30/18 0830)  SpO2: 97 % (04/30/18 0830) Vital Signs (24h Range):  Temp:  [97.8 °F (36.6 °C)-99.3 °F (37.4 °C)] 98 °F (36.7 °C)  Pulse:  [] 112  Resp:  [22-58] 29  SpO2:  [91 %-100 %] 97 %  BP: (118-166)/(58-78) 154/72     Weight: 65 kg (143 lb 4.8 oz)  Body mass index is 23.85 kg/m².     SpO2: 97 %  O2 Device (Oxygen Therapy): nasal cannula      Intake/Output Summary (Last 24 hours) at 04/30/18 0915  Last data filed at 04/30/18 0800   Gross per 24 hour   Intake             1820 ml   Output              805 ml   Net             1015 ml       Lines/Drains/Airways     Drain                 Urethral Catheter 04/29/18 1620 16 Fr. less than 1 day          Peripheral Intravenous Line                 Peripheral IV - Single Lumen 04/26/18 2035 Right Hand 3 days         Peripheral IV - Single Lumen 04/28/18 1847 Right Forearm 1 day                Physical Exam   Constitutional: No distress.   Cardiovascular: Normal rate and regular rhythm.    Pulmonary/Chest: Effort normal and breath sounds normal.   Musculoskeletal: He exhibits no edema.   Neurological: He is alert.       Significant Labs:   BMP:   Recent Labs  Lab 04/29/18  0401   *      K 4.3      CO2 21*   BUN 29*   CREATININE 2.4*   CALCIUM 8.8    and CBC   Recent Labs  Lab 04/29/18  0401   WBC 11.77   HGB 13.4*   HCT 38.1*          Significant Imaging: Echocardiogram:   2D echo with color flow doppler:   Results for orders placed or performed during the hospital encounter of 04/26/18   2D echo with color flow doppler   Result Value Ref Range    EF 35 (A) 55 - 65    Mitral Valve Regurgitation TRIVIAL     Diastolic  Dysfunction Yes (A)     Aortic Valve Regurgitation MILD TO MODERATE (A)     Est. PA Systolic Pressure 31.09     Pericardial Effusion NONE     Tricuspid Valve Regurgitation TRIVIAL TO MILD

## 2018-04-30 NOTE — ASSESSMENT & PLAN NOTE
Last Creat we have in record is from 2012.  May have underlying CKD secondary to HTN and DM (but unable to determine).  Stable.  Continue to monitor  BMP pending .

## 2018-04-30 NOTE — ASSESSMENT & PLAN NOTE
MRI showing acute lacunar-type infarctions involving the superior right terrence and in the left cerebral peduncle as above.  Neurology consulted.  Permissive HTN.  Currently on ASA and Plavix.  PT/OT evaluation.  May need inpatient rehab.  Acute hypoxic respiratory failure (not POA)--secondary to episode of probable aspiration.  Moved to ICU.  Currently doing better on NRB.  Will try to wean down oxygen.  Started on Clindamycin.  ST already following.  BP in reasonable range for now.

## 2018-04-30 NOTE — PLAN OF CARE
Problem: Occupational Therapy Goal  Goal: Occupational Therapy Goal  Goals to be met by: 05/18/18     Patient will increase functional independence with ADLs by performing:    UE Dressing with Set-up Assistance.  LE Dressing with Minimal Assistance.  Grooming while standing at sink with Minimal Assistance.  Toileting from bedside commode with Minimal Assistance for hygiene and clothing management.   Supine to sit with Stand-by Assistance.  Toilet transfer to bedside commode with Contact Guard Assistance.     Outcome: Ongoing (interventions implemented as appropriate)  Patient demo a decline in function since initial OT eval. Patient will benefit from continued OT to address functional deficits. OT goals will be modified as appropriate.

## 2018-04-30 NOTE — PLAN OF CARE
Problem: Patient Care Overview  Goal: Plan of Care Review  Outcome: Ongoing (interventions implemented as appropriate)  Pt continues in the ICU. fever overnight. Max is 99.3.  Wife at bedside.  Pt is alert.  sats 93-98%.  Pt has a productive cough.  Pt is sinus to sinus tach on the monitor. Pt denies any pain. No falls/injuries this shift

## 2018-04-30 NOTE — PLAN OF CARE
Problem: SLP Goal  Goal: SLP Goal  SLP Short Term Goals:  1. Patient will successfully participate in ongoing clinical swallow evaluation and tolerate po trials with no overt s/s of aspiration.   2. Patient will tolerate dental soft diet w/ thin liquids with no overt s/s of aspiration. --DISCONTINUE  3. Patient will participate in full language/speech/cognitive assessment in order to obtain baseline.   Outcome: Ongoing (interventions implemented as appropriate)  4/30/2018: Swallow re-assessment completed this PM. Pt exhibiting dcr oral secretion mgmt, coughing and wet/gurgly vocal quality noted during swallow with small tsp sips of thin liquids and purees. Pt unsafe for all PO intake at this time. ST will f/u for ongoing BSE.  LUNA Calderón. CCC-SLP  Speech-Language Pathologist

## 2018-04-30 NOTE — PROGRESS NOTES
04/30/18 1838   Critical Value Communication   Notified Physician/Designee    Date Result Received 04/30/18   Time Result Received 1838   Resulting Department of Critical Value Resp   Who communicated critical value from resulting department? Martin   Critical Test #1 pH   Critical Test #1 Result 7.36   Critical Test #2 Pco2   Critical Test #2 Result 26   Critical Test #3  PO2   Critical Test #3 Result 75   Critical Test #4 be   Critical Test #4 Result -9   Critical Test #5 HCO3   Critical Test #5 Result 15   Date Notified 04/30/18   Time Notified 1839   Read Back Verification Yes   Physician Directive place pt on Bipap

## 2018-05-01 PROBLEM — Z99.11 ON MECHANICALLY ASSISTED VENTILATION: Status: ACTIVE | Noted: 2018-01-01

## 2018-05-01 PROBLEM — I63.50 CEREBROVASCULAR ACCIDENT (CVA) DUE TO STENOSIS OF CEREBRAL ARTERY: Status: ACTIVE | Noted: 2018-01-01

## 2018-05-01 PROBLEM — I65.1 BASILAR ARTERY STENOSIS/OCCLUSION: Status: ACTIVE | Noted: 2018-01-01

## 2018-05-01 PROBLEM — G31.9 DIFFUSE CEREBRAL ATROPHY: Status: ACTIVE | Noted: 2018-01-01

## 2018-05-01 PROBLEM — R31.0 GROSS HEMATURIA: Status: ACTIVE | Noted: 2018-01-01

## 2018-05-01 PROBLEM — I67.2 ATHEROSCLEROTIC CEREBROVASCULAR DISEASE: Status: ACTIVE | Noted: 2018-01-01

## 2018-05-01 PROBLEM — G31.9 CEREBELLAR ATROPHY: Status: ACTIVE | Noted: 2018-01-01

## 2018-05-01 NOTE — ASSESSMENT & PLAN NOTE
As seen on MRA. This is a concerning finding and appears to have progressed despite supportive measures. He does have significant stenosis. Had no meaningful neuro recovery per our exam today while off sedation. I am concerned he will end up with trach and PEG. Neurologist, Pulmonologist and myself discussed with wife, two sons and daughter end of life measures vs trach/PEG. We recommend withdrawal of life saving intervention if no meaningful recovery appreciated by the end of this week.

## 2018-05-01 NOTE — ASSESSMENT & PLAN NOTE
Apparent episode of aspiration 4/28. Has significant cerebrovascular disease and multiple strokes. Currently intubated. May need NG tube placed once extubated. Will re-consult speech when able to participate.

## 2018-05-01 NOTE — ASSESSMENT & PLAN NOTE
Choked on food being fed to him by wife on 4/28. Declined respiratory wise and now intubated. On empiric clindamycin IV. Resp Cx not impressive. BCx thus far negative. TMax 100F. Will continue with clindamycin and suction ETT as needed. Will likely need NG tube pending Speech eval once extubated.

## 2018-05-01 NOTE — CONSULTS
[facility]  Pulmonology  Consult Note    Patient Name: Jigar Michaels  MRN: [id number]  Admission Date: 4/26/2018  Hospital Length of Stay: 5 days  Code Status: Full Code  Attending Physician: Sosa Holder MD  Primary Care Provider: Hannah Andino MD   Principal Problem: Cerebrovascular accident (CVA) due to stenosis of cerebral artery    Inpatient consult to Pulmonology  Consult performed by: PALOMO SEGOVIA  Consult ordered by: AVEL ROD        Subjective:     HPI:  62 y.o. male that (in part)  has a past medical history of Diabetes mellitus; Hypertension; and Stroke.  Presents to [facility] Emergency Department complaining of generalized weakness.  Subacute onset 1 week ago with progressive worsening.  Last known to be normal several days ago.  Was found to be significantly worse this morning by the patient's family member reports significant worsening of the left upper and lower extremity chronic weakness from a previous stroke.  He is unable to communicate effectively and is coughing, which may be consistent with aspiration.  Denies fever, chills, or recent infection.  No recent hospitalizations.  No report of syncope or collapse.  Significantly decreased oral intake over the last few days.  Patient was refusing to come to the Hospital prior to this.  Unfortunately due to the current condition of the patient remained of the history is limited   Patient with episode of aspiration on the floor. He was brought to the ICU. Last night intubated for respiratory failure.   Pulmonary consulted for ventilator management and ICU co management.     Past Medical History:   Diagnosis Date    Diabetes mellitus     Hypertension     Stroke        History reviewed. No pertinent surgical history.    Review of patient's allergies indicates:  No Known Allergies    Family History     None        Social History Main Topics    Smoking status: Never Smoker    Smokeless  tobacco: Not on file    Alcohol use No    Drug use: No    Sexual activity: Yes         Review of Systems   Unable to perform ROS: Acuity of condition     Objective:     Vital Signs (Most Recent):  Temp: 100.3 °F (37.9 °C) (05/01/18 0400)  Pulse: 79 (05/01/18 0830)  Resp: 15 (05/01/18 0830)  BP: (!) 113/57 (05/01/18 0830)  SpO2: 95 % (05/01/18 0830) Vital Signs (24h Range):  Temp:  [99.2 °F (37.3 °C)-100.3 °F (37.9 °C)] 100.3 °F (37.9 °C)  Pulse:  [] 79  Resp:  [15-60] 15  SpO2:  [91 %-100 %] 95 %  BP: (106-187)/(54-96) 113/57     Weight: 61.3 kg (135 lb 2.3 oz)  Body mass index is 22.49 kg/m².      Intake/Output Summary (Last 24 hours) at 05/01/18 0935  Last data filed at 05/01/18 0715   Gross per 24 hour   Intake          1665.43 ml   Output              690 ml   Net           975.43 ml       Physical Exam   Constitutional: He appears well-developed and well-nourished. No distress.   HENT:   Head: Normocephalic and atraumatic.   ETT in place   Eyes: Pupils are equal, round, and reactive to light.   Neck: Normal range of motion.   Cardiovascular: Normal rate and regular rhythm.    Pulmonary/Chest: Effort normal. He has no wheezes.   Abdominal: Soft. Bowel sounds are normal.   Musculoskeletal: He exhibits no edema or deformity.   Neurological:   Sedated   Skin: Skin is warm and dry. He is not diaphoretic.   Nursing note and vitals reviewed.      Vents:  Vent Mode: PRVC (05/01/18 0917)  Ventilator Initiated: Yes (05/01/18 0021)  Set Rate: 15 bmp (05/01/18 0917)  Vt Set: 450 mL (05/01/18 0917)  PEEP/CPAP: 5 cmH20 (05/01/18 0917)  Oxygen Concentration (%): 40 (05/01/18 0917)  Peak Airway Pressure: 13.2 cmH2O (05/01/18 0917)  Total Ve: 10.6 mL (05/01/18 0917)  F/VT Ratio<105 (RSBI): (!) 36.04 (05/01/18 0747)    Lines/Drains/Airways     Drain                 Urethral Catheter 04/29/18 1620 16 Fr. 1 day         NG/OG Tube 04/30/18 1705 Left nostril less than 1 day          Airway                 Airway -  Non-Surgical 04/30/18 2357 Endotracheal Tube less than 1 day          Peripheral Intravenous Line                 Peripheral IV - Single Lumen 04/28/18 1847 Right Forearm 2 days         Peripheral IV - Single Lumen 05/01/18 0025 Left Wrist less than 1 day         Peripheral IV - Single Lumen 05/01/18 0025 Right Forearm less than 1 day                Significant Labs:    CBC/Anemia Profile:    Recent Labs  Lab 04/30/18  0816   WBC 9.30   HGB 12.6*   HCT 35.8*      MCV 82   RDW 13.6        Chemistries:    Recent Labs  Lab 04/30/18  0816 05/01/18  0344    143   K 4.1 4.6    112*   CO2 17* 16*   BUN 30* 40*   CREATININE 2.2* 2.4*   CALCIUM 8.8 8.4*       All pertinent labs within the past 24 hours have been reviewed.    Significant Imaging:   I have reviewed all pertinent imaging results/findings within the past 24 hours.    Assessment/Plan:     * Cerebrovascular accident (CVA) due to stenosis of cerebral artery    Per Neurology. MRI. Concern for evolving CVA.        On mechanically assisted ventilation    Intubated for aspiration PNA. Seen this morning on ventilator. Nystagmus on exam. Concern for evolving stroke.   Low Vt Ventilation.   Wean as tolerated.   Neuro function will dictate liberation from the ventilator.         Aspiration pneumonia    Ok with Clindamycin. Low threshold to broaden Abx.              Thank you for your consult. I will follow-up with patient. Please contact us if you have any additional questions.     Dulce Alegre MD  Pulmonology  [facility]

## 2018-05-01 NOTE — ASSESSMENT & PLAN NOTE
Uncontrolled with hyperglycemia.  Elevated HgA1c to 8.6% despite insulin therapy at home. Dietary non compliance?  BG not at goal. Will add long acting insulin. Adjust as needed for goal BG < 180. Plan to start tube feeds today.

## 2018-05-01 NOTE — PROGRESS NOTES
Pt with HR in the 140's, RR in the upper 40's, and minimally responsive.    Paged MD Velasquez at the request of the EICU doctor for pt to be intubated.    Dr Velasquez requests anesthesia to intubate.     Dr Padron paged and arrived at bedside to intubate. Pt bagged with 100% O2 and successfully intubated per Dr Padron.

## 2018-05-01 NOTE — EICU
23:54 Called into room for intubation timeout  23:57 Intubated by Dr. Padron with 7.0 ETT, positive EtCO2 and bilateral breath sounds.   Xin Brown RN,  eICU

## 2018-05-01 NOTE — ASSESSMENT & PLAN NOTE
Intubated for aspiration PNA. Seen this morning on ventilator. Nystagmus on exam. Concern for evolving stroke.   Low Vt Ventilation.   Wean as tolerated.   Neuro function will dictate liberation from the ventilator.

## 2018-05-01 NOTE — PT/OT/SLP PROGRESS
Physical Therapy      Patient Name:  Jigar Michaels   MRN:  [id number]    Patient not seen today for PT ROM protocol secondary to Other (Pt now intubated.  New orders for ROM while intubated/sedated received.  Pt did not pass the Early Mobility Move Screen.). Will follow-up tomorrow.    Stephanie Resendez, PT

## 2018-05-01 NOTE — SIGNIFICANT EVENT
Hospital Medicine  Cross-Cover Note        Diagnosis leading to hospitalization: Acute CVA (cerebrovascular accident)    Length of Stay since admission: 4     HPI - Interval History:       Called by nursing staff for impending respiratory failure.  Evaluated by eICU physician.  Recommending elective intubation.      Vitals:  Vitals:    04/30/18 1800 04/30/18 1900 04/30/18 2000 04/30/18 2249   BP: (!) 163/79 139/73 (!) 135/57    BP Location:       Patient Position:       Pulse: (!) 145 (!) 126 106 (!) 125   Resp: (!) 44 (!) 35 (!) 36 (!) 40   Temp:   99.4 °F (37.4 °C)    TempSrc:       SpO2: (!) 93% 97% 98% (!) 94%   Weight:       Height:           Current Medications:  Scheduled Meds:   aspirin  300 mg Rectal Daily    atorvastatin  80 mg Oral Daily    clindamycin (CLEOCIN) IVPB  600 mg Intravenous Q8H    clopidogrel  75 mg Oral Daily    enoxaparin  30 mg Subcutaneous Daily    pantoprazole 40 mg in dextrose 5 % 100 mL infusion (ready to mix system)  40 mg Intravenous Daily    polyethylene glycol  17 g Oral Daily    senna-docusate 8.6-50 mg  1 tablet Oral BID    sodium chloride 0.9%  3 mL Intravenous Q8H     Continuous Infusions:   sodium chloride 0.9%       PRN Meds:.albuterol-ipratropium 2.5mg-0.5mg/3mL, dextrose 50%, glucagon (human recombinant), insulin aspart U-100, labetalol, mineral oil, ondansetron, promethazine, sodium chloride 0.9%    Lab Results:     CBC:    Recent Labs  Lab 04/28/18  0613 04/29/18  0401 04/30/18  0816   WBC 8.63 11.77 9.30   HGB 13.4* 13.4* 12.6*    177 195   MCV 82 83 82   MCH 28.8 29.0 29.0   MCHC 35.2 35.2 35.2   RBC 4.65 4.62 4.35*       CMP:    Recent Labs  Lab 04/26/18 2035 04/28/18  0613 04/29/18  0401 04/30/18  0816    141 141 141   K 4.2 3.8 4.3 4.1    108 108 110   CO2 25 25 21* 17*   BUN 23 21 29* 30*   CREATININE 2.2* 2.0* 2.4* 2.2*   CALCIUM 9.9 8.9 8.8 8.8   MG 2.4 2.0  --   --    PHOS  --  3.0  --   --    PROT 7.7 6.0 6.2  --    BILITOT 0.4 0.5  0.7  --    ALKPHOS 78 65 60  --    ALT 27 22 22  --    AST 20 27 30  --      Coagulantion studies    Recent Labs  Lab 04/26/18 2035 04/28/18  0613   INR 1.0 1.0     Cardiac Enzymes    Recent Labs  Lab 04/26/18 2035 04/28/18  0613   CPK  --  1252*   TROPONINI 0.038* 0.076*     No results for input(s): LACTATE in the last 168 hours.      Consults:  IP CONSULT TO PHYSICAL MEDICINE REHAB  IP CONSULT TO REGISTERED DIETITIAN/NUTRITIONIST  IP CONSULT TO SOCIAL WORK/CASE MANAGEMENT  IP CONSULT TO NEUROLOGY  IP CONSULT TO CARDIOLOGY  IP CONSULT TO SOCIAL WORK/CASE MANAGEMENT  IP CONSULT TO PULMONOLOGY     Assessment and Plan:    Active Hospital Problems    Diagnosis  POA    *Acute CVA (cerebrovascular accident) [I63.9]  Yes     Priority: 1 - High    Acute renal failure [N17.9]  Yes     Priority: 2     CVA, old, hemiparesis [I69.359]  Not Applicable     Priority: 3     Aspiration into airway [T17.908A]  No    Hyperglycemia due to type 2 diabetes mellitus [E11.65]  Yes    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 [I50.41]  Yes    Hypertension [I10]  Yes    Type 2 diabetes mellitus [E11.9]  Yes     Chronic    Dysphagia [R13.10]  Yes      Resolved Hospital Problems    Diagnosis Date Resolved POA   No resolved problems to display.       Labs were reviewed.  Imaging was reviewed.  Problem listed reviewed and updated where needed.    Impending respiratory failure with metabolic and respiratory acidosis.  Decreased mental status and increased work of breathing.  Thick secretions with poor cough.    Agree with recommendations for intubation.    Additional orders placed:    1. Anesthesia called for elective intubation  2. Ventilator order bundle set placed  3. Low threshold to initiate vasopressor status post intubation if necessary to maintain mean arterial pressure greater than 65 mmHg    Silvano Velasquez MD, MPH  Hospital Medicine   Pager: [phone]

## 2018-05-01 NOTE — PT/OT/SLP PROGRESS
Speech Language Pathology      Jigar Michaels  MRN: [id number]    Pt with change in medical status and transfer to ICU.  Previous ST orders will be discontinued.   Please re-consult ST if/when appropriate.     BEENA Reaves, CCC-SLP

## 2018-05-01 NOTE — SIGNIFICANT EVENT
Pt intubated by dr manning with 7.0 ett 24cm at lip.  Pt placed on prvc rr 15 vt 450 peep 5 fio2 100%.   Pt is without distress.  All alarms are on and working.  Pt is tolerating vent.  Ambu bag @hob.  Will continue to monitor.

## 2018-05-01 NOTE — EICU
Now on Vent.  CxR: ET and OG in place.  Stable PNA and chf.    Camera Eval:  Discussed with bed side RN.  sats 98% on fio2 100%.  No central line.  .  .    Plan:  - Vent orderes/bundle entered ( PRVC 450/5/15/100%  - get ABG  - Saline bolus 500 ml once ( BNP > 100, but looks stable on CxR). No lasix yet, could drop BP ( neurology wants it to be up ).  - might need central line to keep BP up later on with phenylephrine.

## 2018-05-01 NOTE — PLAN OF CARE
Problem: Patient Care Overview  Goal: Plan of Care Review  05/01/2018    Recommendations    Recommendation/Intervention: 1) Monitor electrolytes (K, Phos, Mg, Ca), replete prn. 2) Recommend Novasource @ 30 cc/hr with Beneprotein once daily, provides 1465 calories, 71 g protein, 516 cc free water. Initiate at 10 cc/hr and advance by 10 cc Q6 hours or as tolerated until goal rate is acheived. Flushes per MD for hydration and patency. Take strict aspiration precautions. HOB > 30 degrees. Check residuals Q4 hours. Hold x4 hours if > 300 cc or if symptoms of feeding intolerance occurs (abdominal distention, vomiting). If consistently held due to intolerance, recommend prokinetic if no contraindications  Goals: 1) Initiate nutrition support with 24 - 48 hours  Nutrition Goal Status: new  Communication of ROSS Recs: reviewed with physician    Jeanette Taylor, MPH, RD, LDN

## 2018-05-01 NOTE — PROGRESS NOTES
Pt received intubated and on the Servo i vent with the following settings: PRVC 15, , +5 PEEP, 50%. NARN

## 2018-05-01 NOTE — PROGRESS NOTES
Patient seen briefly.  Patient was being evaluated by primary and neurology.  He was intubated yesterday for respiratory failure and airway protection.  Plans for further cardiac evaluation on hold pending recovery.  No further recommendations at this time and continue supportive care.  We'll see as needed.

## 2018-05-01 NOTE — SUBJECTIVE & OBJECTIVE
Past Medical History:   Diagnosis Date    Diabetes mellitus     Hypertension     Stroke        History reviewed. No pertinent surgical history.    Review of patient's allergies indicates:  No Known Allergies    Family History     None        Social History Main Topics    Smoking status: Never Smoker    Smokeless tobacco: Not on file    Alcohol use No    Drug use: No    Sexual activity: Yes         Review of Systems   Unable to perform ROS: Acuity of condition     Objective:     Vital Signs (Most Recent):  Temp: 100.3 °F (37.9 °C) (05/01/18 0400)  Pulse: 79 (05/01/18 0830)  Resp: 15 (05/01/18 0830)  BP: (!) 113/57 (05/01/18 0830)  SpO2: 95 % (05/01/18 0830) Vital Signs (24h Range):  Temp:  [99.2 °F (37.3 °C)-100.3 °F (37.9 °C)] 100.3 °F (37.9 °C)  Pulse:  [] 79  Resp:  [15-60] 15  SpO2:  [91 %-100 %] 95 %  BP: (106-187)/(54-96) 113/57     Weight: 61.3 kg (135 lb 2.3 oz)  Body mass index is 22.49 kg/m².      Intake/Output Summary (Last 24 hours) at 05/01/18 0935  Last data filed at 05/01/18 0715   Gross per 24 hour   Intake          1665.43 ml   Output              690 ml   Net           975.43 ml       Physical Exam   Constitutional: He appears well-developed and well-nourished. No distress.   HENT:   Head: Normocephalic and atraumatic.   ETT in place   Eyes: Pupils are equal, round, and reactive to light.   Neck: Normal range of motion.   Cardiovascular: Normal rate and regular rhythm.    Pulmonary/Chest: Effort normal. He has no wheezes.   Abdominal: Soft. Bowel sounds are normal.   Musculoskeletal: He exhibits no edema or deformity.   Neurological:   Sedated   Skin: Skin is warm and dry. He is not diaphoretic.   Nursing note and vitals reviewed.      Vents:  Vent Mode: PRVC (05/01/18 0917)  Ventilator Initiated: Yes (05/01/18 0021)  Set Rate: 15 bmp (05/01/18 0917)  Vt Set: 450 mL (05/01/18 0917)  PEEP/CPAP: 5 cmH20 (05/01/18 0917)  Oxygen Concentration (%): 40 (05/01/18 0917)  Peak Airway Pressure:  13.2 cmH2O (05/01/18 0917)  Total Ve: 10.6 mL (05/01/18 0917)  F/VT Ratio<105 (RSBI): (!) 36.04 (05/01/18 0747)    Lines/Drains/Airways     Drain                 Urethral Catheter 04/29/18 1620 16 Fr. 1 day         NG/OG Tube 04/30/18 1705 Left nostril less than 1 day          Airway                 Airway - Non-Surgical 04/30/18 2357 Endotracheal Tube less than 1 day          Peripheral Intravenous Line                 Peripheral IV - Single Lumen 04/28/18 1847 Right Forearm 2 days         Peripheral IV - Single Lumen 05/01/18 0025 Left Wrist less than 1 day         Peripheral IV - Single Lumen 05/01/18 0025 Right Forearm less than 1 day                Significant Labs:    CBC/Anemia Profile:    Recent Labs  Lab 04/30/18  0816   WBC 9.30   HGB 12.6*   HCT 35.8*      MCV 82   RDW 13.6        Chemistries:    Recent Labs  Lab 04/30/18  0816 05/01/18  0344    143   K 4.1 4.6    112*   CO2 17* 16*   BUN 30* 40*   CREATININE 2.2* 2.4*   CALCIUM 8.8 8.4*       All pertinent labs within the past 24 hours have been reviewed.    Significant Imaging:   I have reviewed all pertinent imaging results/findings within the past 24 hours.

## 2018-05-01 NOTE — ANESTHESIA PROCEDURE NOTES
Intubation    Diagnosis: respiratory failure  Patient location during procedure: ICU  Procedure start time: 4/30/2018 11:57 PM  Timeout: 4/30/2018 11:54 PM  Procedure end time: 4/30/2018 11:59 PM  Staffing  Anesthesiologist: FELA BLOUNT  Performed: anesthesiologist   Anesthesiologist was present at the time of the procedure.  Preanesthetic Checklist  Completed: patient identified, site marked, surgical consent, pre-op evaluation, timeout performed, IV checked, risks and benefits discussed, monitors and equipment checked and anesthesia consent given  Intubation  Indication: respiratory distress  Pre-oxygenation. Induction: intravenous w cricoid pressure, mask ventilation: moderately difficult with oral airway., laryngoscopy direct, Twyla 3.  Endotracheal Tube: oral, 7.0 mm ID, cuffed (inflated to minimal occlusive pressure)  Attempts: 1, Grade III - only arytenoids seen  Complicating Factors: anterior larynx, large/floppy epiglottis and short neck  Tube secured at 23 cm at the lips.  Findings post-intubation: bilateral breath sounds, positive ETCO2, atraumatic / condition of teeth unchanged  Position Confirmation: auscultation  Additional Notes  Note that bouge was needed for intubation..no sux do to recent stroke

## 2018-05-01 NOTE — SUBJECTIVE & OBJECTIVE
Interval History: intubated last night for progressive respiratory failure despite supplemental O2 and bronchodilator support. Oxygenating well today while on vent. Is sedated. With low grade fever. Is on clinda IV. . Renal function poor but grossly unchanged. Wife asking when he can be fed.      Review of Systems   Unable to perform ROS: Intubated     Objective:     Vital Signs (Most Recent):  Temp: 100.3 °F (37.9 °C) (05/01/18 0400)  Pulse: 79 (05/01/18 0830)  Resp: 15 (05/01/18 0830)  BP: (!) 113/57 (05/01/18 0830)  SpO2: 95 % (05/01/18 0830) Vital Signs (24h Range):  Temp:  [99.2 °F (37.3 °C)-100.3 °F (37.9 °C)] 100.3 °F (37.9 °C)  Pulse:  [] 79  Resp:  [15-60] 15  SpO2:  [91 %-100 %] 95 %  BP: (106-187)/(54-96) 113/57     Weight: 61.3 kg (135 lb 2.3 oz)  Body mass index is 22.49 kg/m².    Intake/Output Summary (Last 24 hours) at 05/01/18 0922  Last data filed at 05/01/18 0715   Gross per 24 hour   Intake          1665.43 ml   Output              690 ml   Net           975.43 ml      Physical Exam   Constitutional: He appears well-developed. No distress.   HENT:   Head: Normocephalic and atraumatic.   ETT in place   Eyes: Pupils are equal, round, and reactive to light.   Cardiovascular: Normal rate and regular rhythm.    Pulmonary/Chest: Effort normal. He has rales (distant).   On MV   Abdominal: Soft. Bowel sounds are normal. He exhibits no distension.   Musculoskeletal: Normal range of motion. He exhibits no edema.   Neurological: He is alert. He displays normal reflexes.   sedated   Skin: He is not diaphoretic.   Nursing note and vitals reviewed.      Significant Labs: All pertinent labs within the past 24 hours have been reviewed.    Significant Imaging: I have reviewed all pertinent imaging results/findings within the past 24 hours.  I have reviewed and interpreted all pertinent imaging results/findings within the past 24 hours.

## 2018-05-01 NOTE — ANESTHESIA PREPROCEDURE EVALUATION
05/01/2018  Jigar Michaels is a 62 y.o., male.    Anesthesia Evaluation    I have reviewed the Patient Summary Reports.    I have reviewed the Nursing Notes.      Review of Systems  Anesthesia Hx:  No previous Anesthesia    Social:  Non-Smoker    Hematology/Oncology:  Hematology Normal   Oncology Normal     EENT/Dental:EENT/Dental Normal   Cardiovascular:   Hypertension CHF    Pulmonary:  Pulmonary Normal    Renal/:   Chronic Renal Disease    Hepatic/GI:  Hepatic/GI Normal    Musculoskeletal:  Musculoskeletal Normal    Neurological:   CVA    Endocrine:   Diabetes    Dermatological:  Skin Normal    Psych:  Psychiatric Normal           Physical Exam   Airway/Jaw/Neck:  Airway Findings: General Airway Assessment: Adult TM Distance: < 4 cm      Dental:  Dental Findings: In tact             Anesthesia Plan  Type of Anesthesia, risks & benefits discussed:  Anesthesia Type:  general  Patient's Preference:   Intra-op Monitoring Plan: standard ASA monitors  Intra-op Monitoring Plan Comments:   Post Op Pain Control Plan: multimodal analgesia, IV/PO Opioids PRN and per primary service following discharge from PACU  Post Op Pain Control Plan Comments:   Induction:    Beta Blocker:  Patient is not currently on a Beta-Blocker (No further documentation required).       Informed Consent: Patient understands risks and agrees with Anesthesia plan.  Questions answered. Anesthesia consent signed with patient.  ASA Score: 4     Day of Surgery Review of History & Physical:    H&P update referred to the provider.  H&P completed by Anesthesiologist.   Anesthesia Plan Notes: npo        Ready For Surgery From Anesthesia Perspective.

## 2018-05-01 NOTE — PT/OT/SLP PROGRESS
Occupational Therapy      Patient Name:  Jigar Michaels   MRN:  [id number]    Patient is currently intubated and not appropriate for OT and will discontinued. Please re-consult when appropriate.    Shreya Murray, OT  5/1/2018

## 2018-05-01 NOTE — EICU
ABG: worsening metabolic and resp acidosis.  CxR: mild worsening of rt sided air space densnity and chf.    Camera Eval:  Ve 33 lit, RR 40, increased wob.  Worsening mentation.  Thick secretion unable to cough up. HR still 120 to 130.    Plan:  - Impending resp failure .  Elective intubation is advised.  Bed side RN going to inform hospital ist.     - might need levophed to keep BP up, post intubation ( sympathetic drive will go down post intubation and can cause hypotension ).

## 2018-05-01 NOTE — ASSESSMENT & PLAN NOTE
Difficult to allow permissive HTN given patient's LV systolic depression to 35% (per TTE), however he may no longer benefit from permissive HTN as he is > 72 hrs post new stroke. Is currently off antihypertensive therapy.

## 2018-05-01 NOTE — PROGRESS NOTES
[facility] Hospital Medicine  Progress Note    Patient Name: Jigar Michaels  MRN: [id number]  Patient Class: IP- Inpatient   Admission Date: 4/26/2018  Length of Stay: 5 days  Attending Physician: Sosa Holder MD  Primary Care Provider: Hannah Andino MD        Subjective:     Principal Problem:Cerebrovascular accident (CVA) due to stenosis of cerebral artery    HPI:  Jigar Michaels is a 62 y.o. male that (in part)  has a past medical history of Diabetes mellitus; Hypertension; and Stroke.  Presents to [facility] Emergency Department complaining of generalized weakness.  Subacute onset 1 week ago with progressive worsening.  Last known to be normal several days ago.  Was found to be significantly worse this morning by the patient's family member reports significant worsening of the left upper and lower extremity chronic weakness from a previous stroke.  He is unable to communicate effectively and is coughing, which may be consistent with aspiration.  Denies fever, chills, or recent infection.  No recent hospitalizations.  No report of syncope or collapse.  Significantly decreased oral intake over the last few days.  Patient was refusing to come to the Hospital prior to this.  Unfortunately due to the current condition of the patient remained of the history is limited    In the emergency department routine laboratory studies, CT of the head, and a consultation to tele-neurology were performed.  There is concern for acute CVA.  He is being admitted for stroke workup and neurology consultation.  MRI of the morning.    Hospital medicine has been asked to admit for further evaluation and treatment.    Hospital Course:  MRI confirms acute CVA with concern for possible embolic cause. ECHO showed new CHF, therefore, Cardiology consulted.  Neurology consulted. Continue permissive HTN and will likely need rehab for placement. Will consult social service to begin discharge planning with  rehab placement.  Patient apparently aspirated after being fed some meat by wife on 4/28.  Became more hypoxic and transferred to ICU. Respiratory status continued to worsen requiring endotracheal intubation.     Interval History: intubated last night for progressive respiratory failure despite supplemental O2 and bronchodilator support. Oxygenating well today while on vent. Is sedated. With low grade fever. Is on clinda IV. . Renal function poor but grossly unchanged. Wife asking when he can be fed.      Review of Systems   Unable to perform ROS: Intubated     Objective:     Vital Signs (Most Recent):  Temp: 100.3 °F (37.9 °C) (05/01/18 0400)  Pulse: 79 (05/01/18 0830)  Resp: 15 (05/01/18 0830)  BP: (!) 113/57 (05/01/18 0830)  SpO2: 95 % (05/01/18 0830) Vital Signs (24h Range):  Temp:  [99.2 °F (37.3 °C)-100.3 °F (37.9 °C)] 100.3 °F (37.9 °C)  Pulse:  [] 79  Resp:  [15-60] 15  SpO2:  [91 %-100 %] 95 %  BP: (106-187)/(54-96) 113/57     Weight: 61.3 kg (135 lb 2.3 oz)  Body mass index is 22.49 kg/m².    Intake/Output Summary (Last 24 hours) at 05/01/18 0922  Last data filed at 05/01/18 0715   Gross per 24 hour   Intake          1665.43 ml   Output              690 ml   Net           975.43 ml      Physical Exam   Constitutional: He appears well-developed. No distress.   HENT:   Head: Normocephalic and atraumatic.   ETT in place   Eyes: Pupils are equal, round, and reactive to light.   Cardiovascular: Normal rate and regular rhythm.    Pulmonary/Chest: Effort normal. He has rales (distant).   On MV   Abdominal: Soft. Bowel sounds are normal. He exhibits no distension.   Musculoskeletal: Normal range of motion. He exhibits no edema.   Neurological: He is alert. He displays normal reflexes.   sedated   Skin: He is not diaphoretic.   Nursing note and vitals reviewed.      Significant Labs: All pertinent labs within the past 24 hours have been reviewed.    Significant Imaging: I have reviewed all pertinent  imaging results/findings within the past 24 hours.  I have reviewed and interpreted all pertinent imaging results/findings within the past 24 hours.    Assessment/Plan:      * Cerebrovascular accident (CVA) due to stenosis of cerebral artery    MRI showing acute lacunar-type infarctions involving the superior right terrence and in the left cerebral peduncle as above.  MRA with extensive diffuse cerebrovascular disease with multiple areas of high grade stenosis.  Neurology discussed findings with stroke service at [facility]. Recommended continuing optimized medical therapy  On clopidogrel, statin and ASA. Adjusting insulin for better BG control.    BP stable thus far but could not achieve permissive HTN given degree LV systolic dysfunction. Is out of window for this anyway. Vasopressor support as needed if hypotension.    PT/OT when able to participate           Gross hematuria    May be from traumatic cuevas placement. Is on ASA and plavix for presenting stroke, which is needs at this time. Is also on lovenox for DVT proph which I can hold. No CBC drawn today but prior levels have been stable. Will obtain one now. Will consult urology for co-management.           Basilar artery stenosis/occlusion    As seen on MRA. This is a concerning finding. Pending formal eval by neuro while off sedation. If not responding, patient may require repeat brain imaging          On mechanically assisted ventilation    On day # 2 of vent. Famotidine for stress ulcer proph. Pulm on board for co-management.           Atherosclerotic cerebrovascular disease    Seen on MRA. Likely 2/2 to poorly controlled DM2          Diffuse cerebral atrophy    Seen on MRI. Likely 2/2 chronic microvascular disease.           Cerebellar atrophy    Seen on MRI. Likely 2/2 chronic microvascular disease.           Aspiration into airway    Choked on food being fed to him by wife on 4/28. Declined respiratory wise and now intubated. On empiric clindamycin IV.  Resp Cx not impressive. BCx thus far negative. TMax 100F. Will continue with clindamycin and suction ETT as needed. Will likely need NG tube pending Speech eval once extubated.           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  Cardiology consulted. Planning on ischemic workup with LHC vs NST pending improvement of renal fx  On ASA, clopidogrel and statin. Will start a BB          Acute renal failure    Last Creat we have in record is from 2012.  May have underlying CKD secondary to HTN and DM   Is to receive a dose of IV lasix today for rales suggesting pulm edema.   Is also on ASA supp 300 mg which patient currently needs  Avoid any other nephrotoxin as much as possible as he would be a poor candidate for RRT if he were to need it  Mcghee in place. Strict I/Os. Renal panel in AM          Hyperglycemia due to type 2 diabetes mellitus    Adjust insulin therapy for goal BG < 180          CVA, old, hemiparesis    As above          Type 2 diabetes mellitus with hyperglycemia, with long-term current use of insulin    Uncontrolled with hyperglycemia.  Elevated HgA1c to 8.6% despite insulin therapy at home. Dietary non compliance?  BG not at goal. Will add long acting insulin. Adjust as needed for goal BG < 180. Plan to start tube feeds today.           Hypertension    Difficult to allow permissive HTN given patient's LV systolic depression to 35% (per TTE), however he may no longer benefit from permissive HTN as he is > 72 hrs post new stroke. Is currently off antihypertensive therapy.           Other dysphagia    Apparent episode of aspiration 4/28. Has significant cerebrovascular disease and multiple strokes. Currently intubated. May need NG tube placed once extubated. Will re-consult speech when able to participate.           VTE Risk Mitigation         Ordered     enoxaparin injection 30 mg  Daily      04/29/18 1549     IP VTE LOW RISK PATIENT   Once      04/27/18 0248     Place sequential compression device  Until discontinued      04/27/18 0248        Adjust insulin dose. Nutrition consulted for TF. Neuro exam while off sedation. Plan of care discussed with patient's wife at bedside.     Critical care time spent on the evaluation and treatment of severe organ dysfunction, review of pertinent labs and imaging studies, discussions with consulting providers and discussions with patient/family: > 35 minutes.    Sosa Tellez MD  Department of Hospital Medicine   [facility]

## 2018-05-01 NOTE — HPI
62 y.o. male that (in part)  has a past medical history of Diabetes mellitus; Hypertension; and Stroke.  Presents to [facility] Emergency Department complaining of generalized weakness.  Subacute onset 1 week ago with progressive worsening.  Last known to be normal several days ago.  Was found to be significantly worse this morning by the patient's family member reports significant worsening of the left upper and lower extremity chronic weakness from a previous stroke.  He is unable to communicate effectively and is coughing, which may be consistent with aspiration.  Denies fever, chills, or recent infection.  No recent hospitalizations.  No report of syncope or collapse.  Significantly decreased oral intake over the last few days.  Patient was refusing to come to the Hospital prior to this.  Unfortunately due to the current condition of the patient remained of the history is limited   Patient with episode of aspiration on the floor. He was brought to the ICU. Last night intubated for respiratory failure.   Pulmonary consulted for ventilator management and ICU co management.

## 2018-05-01 NOTE — ASSESSMENT & PLAN NOTE
Echo showing EF of 35% with diastolic dysfunction.  Cardiology consulted. Planning on ischemic workup with LHC vs NST pending improvement of renal fx  On ASA, clopidogrel and statin. Will start a BB

## 2018-05-01 NOTE — EICU
Camera Eval:  Discussed with bed side RN.  Getting more sob and wob.  Did receive fluid bolus and increased saline earlier by Neurology to keep BP > 150 for his CVA.    On BiPAP: 15/5/rate 12/60%.  sats 94%.  RR 18 to 20.  .    Mentation worsening?    A:  1. CVA, asp PNA -respiratory failure  2. Encephalopathy.  3. Metabolic acidosis from LARISA and above  4. Low ef.  Could have added chf    - full code  - stat CxR and ABG, BNP  - low threshold for intubation.  - high risk for aspiration.

## 2018-05-01 NOTE — PLAN OF CARE
Problem: Patient Care Overview  Goal: Plan of Care Review  Outcome: Ongoing (interventions implemented as appropriate)  Pt remains in ICU now on ventilator. Propofol and fentanyl for sedation. NSR on the monitor post intubation. Tmax 100.3. NGT intact. Mcghee draining dark ceferino/ reddish urine. Remains free from fall, injury, or breakdown throughout the shift.

## 2018-05-01 NOTE — PROGRESS NOTES
[facility] [place]  Neurology  Progress Note    Patient Name: Jigar Michaels  MRN: [id number]  Admission Date: 4/26/2018  Hospital Length of Stay: 5 days  Code Status: Full Code   Attending Provider: Sosa Holder MD  Primary Care Physician: Hannah Andino MD   Principal Problem:Cerebrovascular accident (CVA) due to stenosis of cerebral artery    Subjective:     Interval History: 61 y/o male with medical Hx as listed below comes to ED for weakness on left side. Pt has Hx of stroke with residual left sided weakness but family reports worsening of motor deficits. His wife states that he is able to walk and feed himself at baseline but his speech is slurred. He is adherent to medications according to wife. Pt has already had at least two strokes.     -4/28/18: Pt with severe coughing when eating.     -4/29/18: Transferred to ICU after episode aspiration while eating.     -4/30/18: Pt is more dysarthric.     -5/1/18: Intubated overnight. Off sedation pt is not responsive.    Current Neurological Medications:     Current Facility-Administered Medications   Medication Dose Route Frequency Provider Last Rate Last Dose    albuterol-ipratropium 2.5mg-0.5mg/3mL nebulizer solution 3 mL  3 mL Nebulization Q4H PRN Alexander Florence MD   3 mL at 04/30/18 1759    aspirin suppository 300 mg  300 mg Rectal Daily Lul Wylie MD   300 mg at 05/01/18 0900    atorvastatin tablet 80 mg  80 mg Per OG tube Daily Cristóbal Batista MD   80 mg at 05/01/18 0900    chlorhexidine 0.12 % solution 15 mL  15 mL Mouth/Throat BID Cristóbal Batista MD   15 mL at 05/01/18 0900    clindamycin 600 MG/50 ML D5W 600 mg/50 mL IVPB 600 mg  600 mg Intravenous Q8H Junior Chew  mL/hr at 05/01/18 1000 600 mg at 05/01/18 1000    clopidogrel tablet 75 mg  75 mg Per OG tube Daily Cristóbal Batista MD   75 mg at 05/01/18 0900    dextrose 50% injection 12.5 g  12.5 g Intravenous PRN Silvano Velasquez MD        enoxaparin injection 30 mg   30 mg Subcutaneous Daily Alexander Florence MD   30 mg at 04/30/18 1719    famotidine (PF) injection 20 mg  20 mg Intravenous Daily Cristóbal Batista MD   20 mg at 05/01/18 0900    fentaNYL 2500 mcg in 0.9% sodium chloride 250 mL infusion premix (titrating)  25 mcg/hr Intravenous Continuous Silvano Velasquez MD   Stopped at 05/01/18 0845    fentaNYL injection 50 mcg  50 mcg Intravenous Q1H PRN Silvano Velasquez MD        glucagon (human recombinant) injection 1 mg  1 mg Intramuscular PRN Silvano Velasquez MD        insulin aspart U-100 pen 1-10 Units  1-10 Units Subcutaneous Q6H PRN Silvano Velasquez MD   4 Units at 05/01/18 0522    insulin detemir U-100 pen 5 Units  5 Units Subcutaneous QHS Sosa Holder MD        labetalol injection 10 mg  10 mg Intravenous Q15 Min PRN Silvano Velasquez MD        [START ON 5/2/2018] metoprolol tartrate (LOPRESSOR) tablet 25 mg  25 mg Per OG tube BID Sosa Holder MD        mineral oil enema 1 enema  1 enema Rectal Daily PRN Silvano Velasquez MD        ondansetron disintegrating tablet 8 mg  8 mg Oral Q8H PRN Silvano Velasquez MD   8 mg at 04/28/18 0959    polyethylene glycol packet 17 g  17 g Per G Tube Daily Sosa Holder MD   17 g at 05/01/18 0900    promethazine suppository 25 mg  25 mg Rectal Q6H PRN Silvano Velasquez MD        propofol (DIPRIVAN) 10 mg/mL infusion  5 mcg/kg/min Intravenous Continuous Silvano Velasquez MD 3.9 mL/hr at 05/01/18 1200 10 mcg/kg/min at 05/01/18 1200    senna-docusate 8.6-50 mg per tablet 1 tablet  1 tablet Per OG tube BID Sosa Holder MD   1 tablet at 05/01/18 0900    sodium chloride 0.9% bolus 500 mL  500 mL Intravenous Continuous PRN Silvano Velasquez MD        sodium chloride 0.9% bolus 500 mL  500 mL Intravenous Once Cristóbal Batista MD        sodium chloride 0.9% flush 3 mL  3 mL Intravenous Q8H Silvano Velasquez MD   3 mL at 04/30/18 2200       Review of Systems   Unable to obtain; pt  intubated    Objective:     Vital Signs (Most Recent):  Temp: 98.8 °F (37.1 °C) (05/01/18 0715)  Pulse: 83 (05/01/18 0951)  Resp: 20 (05/01/18 0951)  BP: 130/60 (05/01/18 0932)  SpO2: 95 % (05/01/18 0951) Vital Signs (24h Range):  Temp:  [98.8 °F (37.1 °C)-100.3 °F (37.9 °C)] 98.8 °F (37.1 °C)  Pulse:  [] 83  Resp:  [15-60] 20  SpO2:  [91 %-100 %] 95 %  BP: (106-187)/(54-96) 130/60     Weight: 61.3 kg (135 lb 2.3 oz)  Body mass index is 22.49 kg/m².    Physical Exam  Constitutional: Intubated  Head: Normocephalic.   Eyes: Right eye exhibits no discharge. Left eye exhibits no discharge.   Neck: Normal range of motion.   Cardiovascular: Normal rate and regular rhythm.    Pulmonary/Chest: Effort normal and breath sounds normal.   Abdominal: Soft. Bowel sounds are normal.   Musculoskeletal: He exhibits no edema.   Neurological: He is oriented to person, place, and time.   Skin: He is not diaphoretic.         NEUROLOGICAL EXAMINATION:      MENTAL STATUS   Intubated; unresponsive to verbal stimuli     CRANIAL NERVES      CN III, IV, VI   Right pupil: Size: 2 mm. Shape: regular.   Left pupil: Size: 2 mm. Shape: regular.   Nystagmus: vertical.  Eyes deviated downward       CN VII   Right facial weakness: none  Left facial weakness: central     Postive cough and gag reflex     MOTOR EXAM        Bilateral Babinski; withdraws on left side        Significant Labs:   CBC:   Recent Labs  Lab 04/30/18  0816   WBC 9.30   HGB 12.6*   HCT 35.8*        CMP:   Recent Labs  Lab 04/30/18  0816 05/01/18  0344   * 208*    143   K 4.1 4.6    112*   CO2 17* 16*   BUN 30* 40*   CREATININE 2.2* 2.4*   CALCIUM 8.8 8.4*   ANIONGAP 14 15   EGFRNONAA 31* 28*       Significant Imaging:  MRI brain    Examination mildly degraded by patient motion artifact.    Multiple small posterior circulation distribution infarcts, which appear slightly progressed from the recent study of 04/27/2018 as further detailed  above.    Acute findings superimposed upon a background of moderate chronic ischemic change with generalized cerebral volume loss.      Electronically signed by: Sudarshan Isaac MD  Date: 05/01/2018  Time: 12:22    Assessment and Plan:     61 y/o male with acute stroke     1. Stroke: acute areas of infarction on posterior circulation. With his multiple previous strokes and now to more areas this suggests embolism. No Hx of atrial fibrillation but new HF.            -MRA head shows significant basilar stenosis among other findings likely due to atheromatous disease. This can make him prone to other strokes and decompensation if brainstem is involved more. Note that this pt has multiple co-morbidities including HF and his situation may change critically.For now high dose statin and dual antiplatelets.           -Will repeat MRI of brain as pt is unresponsive off sedation and now presenting vertical nystagmus. Note that involvements of bilateral cerebral peduncle sand ventral terrence can result in a locked-in state. Will continue to assess.               Active Diagnoses:    Diagnosis Date Noted POA    PRINCIPAL PROBLEM:  Cerebrovascular accident (CVA) due to stenosis of cerebral artery [I63.50] 04/27/2018 Yes    Cerebellar atrophy [G31.9] 05/01/2018 Yes    Diffuse cerebral atrophy [G31.9] 05/01/2018 Yes    Atherosclerotic cerebrovascular disease [I67.2] 05/01/2018 Yes    On mechanically assisted ventilation [Z99.11] 05/01/2018 Not Applicable    Basilar artery stenosis/occlusion [I65.1] 05/01/2018 Yes    Aspiration into airway [T17.908A] 04/30/2018 No    Hyperglycemia due to type 2 diabetes mellitus [E11.65] 04/27/2018 Yes    Acute renal failure [N17.9] 04/27/2018 Yes    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 [I50.41] 04/27/2018 Yes    Aspiration pneumonia [J69.0] 12/17/2012 Yes    Hypertension [I10] 12/09/2012 Yes    Type 2 diabetes mellitus with hyperglycemia, with long-term current use  of insulin [E11.65, Z79.4] 12/09/2012 Not Applicable    CVA, old, hemiparesis [I69.359] 12/09/2012 Not Applicable    Other dysphagia [R13.19] 12/08/2012 Yes      Problems Resolved During this Admission:    Diagnosis Date Noted Date Resolved POA       VTE Risk Mitigation         Ordered     enoxaparin injection 30 mg  Daily      04/29/18 1549     IP VTE LOW RISK PATIENT  Once      04/27/18 0248     Place sequential compression device  Until discontinued      04/27/18 0248          Lul Wylie MD  Neurology  [facility]

## 2018-05-01 NOTE — CONSULTS
"    [facility]  Adult Nutrition  Consult Note    SUMMARY     Recommendations    Recommendation/Intervention: 1) Monitor electrolytes (K, Phos, Mg, Ca), replete prn. 2) Recommend Novasource @ 30 cc/hr with Beneprotein once daily, provides 1465 calories, 71 g protein, 516 cc free water. Initiate at 10 cc/hr and advance by 10 cc Q6 hours or as tolerated until goal rate is acheived. Flushes per MD for hydration and patency. Take strict aspiration precautions. HOB > 30 degrees. Check residuals Q4 hours. Hold x4 hours if > 300 cc or if symptoms of feeding intolerance occurs (abdominal distention, vomiting). If consistently held due to intolerance, recommend prokinetic if no contraindications  Goals: 1) Initiate nutrition support with 24 - 48 hours  Nutrition Goal Status: new  Communication of RD Recs: reviewed with physician    Reason for Assessment    Reason for Assessment: consult, new tube feeding  Diagnosis: other (see comments) (Acute CVA, ARF)  Relevant Medical History: DM, Stroke, HTN  Interdisciplinary Rounds: did not attend    General Information Comments: Patient intubated, sedated. Propofol infusing. OGT in place. Prior to intubation, Speech Therapy recommends NPO. Renal function poor per MD.     Nutrition Discharge Planning: Too soon to determine. Will monitor/follow-up.     Nutrition Risk Screen    Nutrition Risk Screen: no indicators present    Nutrition/Diet History    Do you have any cultural, spiritual, Jewish conflicts, given your current situation?: JEN cultural, Jewish or ethnic food preferences  Factors Affecting Nutritional Intake: difficulty/impaired swallowing, NPO, on mechanical ventilation    Anthropometrics    Temp: 98.8 °F (37.1 °C)  Height Method: Measured  Height: 5' 5" (165.1 cm)  Height (inches): 65 in  Weight Method: Bed Scale  Weight: 61.3 kg (135 lb 2.3 oz)  Weight (lb): 135.14 lb  Ideal Body Weight (IBW), Male: 136 lb  % Ideal Body Weight, Male (lb): 99.37 " lb  BMI (Calculated): 22.5  BMI Grade: 18.5-24.9 - normal       Lab/Procedures/Meds    Pertinent Labs Reviewed: reviewed  Pertinent Labs Comments: Serum Blood Glucose 208 (H), BUN 40 (H), Creat 2.4 (H), GFR 28 (L)  Pertinent Medications Reviewed: reviewed  Pertinent Medications Comments: propofol, lasix, insulin, fentanyl    Physical Findings/Assessment    Overall Physical Appearance: edematous, nourished, on ventilator support (trace edema 1+)  Tubes: orogastric tube  Oral/Mouth Cavity: WDL  Skin: edema, intact (Luigi Score 12)    Estimated/Assessed Needs    Weight Used For Calorie Calculations: 61.3 kg (135 lb 2.3 oz)  Energy Need Method: [facility] (1585)  Protein Requirements: 69 - 76 g  Weight Used For Protein Calculations: 65 kg (143 lb 4.8 oz)  Fluid Need Method: other (per MD)    CHO Requirement: 170 g      Nutrition Prescription Ordered    Current Diet Order: NPO    Evaluation of Received Nutrient/Fluid Intake    Other Calories (kcal): 103 (propofol)  IV Fluid (mL): 1800  I/O: 1929/795  Energy Calories Required: not meeting needs  Protein Required: not meeting needs  Fluid Required: other (see comments) (per MD)  Comments: LBM: 4/26  % Intake of Estimated Energy Needs: 0 - 25 %  % Meal Intake: NPO    Nutrition Risk    Level of Risk/Frequency of Follow-up:  (f/u 2x weekly)     Assessment and Plan    Nutrition Diagnosis:  Problem: Inadequate energy intake  Etiology: decreased ability to consume sufficient energy  Signs/Symptoms: NPO, intubated, receiving 0 -25% of EEN  Status: new       Monitor and Evaluation    Food and Nutrient Intake: energy intake, enteral nutrition intake  Food and Nutrient Adminstration: enteral and parenteral nutrition administration  Knowledge/Beliefs/Attitudes: food and nutrition knowledge/skill, beliefs and attitudes  Physical Activity and Function: nutrition-related ADLs and IADLs  Anthropometric Measurements: weight, weight change, body mass index  Biochemical Data, Medical Tests  and Procedures: electrolyte and renal panel, gastrointestinal profile, glucose/endocrine profile, lipid profile  Nutrition-Focused Physical Findings: overall appearance     Nutrition Follow-Up    RD Follow-up?: Yes

## 2018-05-01 NOTE — ASSESSMENT & PLAN NOTE
Last Creat we have in record is from 2012.  May have underlying CKD secondary to HTN and DM   Is to receive a dose of IV lasix today for rales suggesting pulm edema.   Is also on ASA supp 300 mg which patient currently needs  Avoid any other nephrotoxin as much as possible as he would be a poor candidate for RRT if he were to need it  Mcghee in place. Strict I/Os. Renal panel in AM

## 2018-05-01 NOTE — PLAN OF CARE
Problem: Patient Care Overview  Goal: Plan of Care Review  Outcome: Ongoing (interventions implemented as appropriate)  Pt remains in ICU today with continued symptoms of CVA. Pt with left sided hemiparesis, inability to swallow or contain his own oral secretions. NT suctioning required throughout shift, secretions very thick. Patient with frequent weak cough, unable to clear secretions. Respiratory status declines throughout day, this afternoon patient becomes labored with grunting and dyspnea, breathing treatment done and then patient becomes quite lethargic, ABG done and Bipap ordered by Dr Jenkins. Pulm consulted placed for tomorrow. NGT placed. Pts wife at bedside throughout shift, included in plan of care. Pt with no signs of fall, trauma, injury today. Precautions in place for each of these.

## 2018-05-01 NOTE — ASSESSMENT & PLAN NOTE
MRI showing acute lacunar-type infarctions involving the superior right terrence and in the left cerebral peduncle as above.  MRA with extensive diffuse cerebrovascular disease with multiple areas of high grade stenosis.  Neurology discussed findings with stroke service at main campus. Recommended continuing optimized medical therapy  On clopidogrel, statin and ASA. Adjusting insulin for better BG control.    BP stable thus far but could not achieve permissive HTN given degree LV systolic dysfunction. Is out of window for this anyway. Vasopressor support as needed if hypotension.    PT/OT when able to participate

## 2018-05-01 NOTE — ASSESSMENT & PLAN NOTE
May be from traumatic cuevas placement. Is on ASA and plavix for presenting stroke, which is needs at this time. Hematuria appears to be resolving. Will restart lovenox for DVT proph as he is at high risk

## 2018-05-02 NOTE — ASSESSMENT & PLAN NOTE
Agree with moxifloxacin as empiric treatment. On vent support. Tube feeds started. May need PEG as dysphagia is unlikely to resolve given extent of cerebrovascular disease.

## 2018-05-02 NOTE — PROGRESS NOTES
Received patient on vent settings PRVC 15/450/+5/FIO2 40% ETT size 7.5 secured at 23cm at the lip Ambu bag and mask at the bed side

## 2018-05-02 NOTE — PROGRESS NOTES
[facility] Hospital Medicine  Progress Note    Patient Name: Jigar Michaels  MRN: [id number]  Patient Class: IP- Inpatient   Admission Date: 4/26/2018  Length of Stay: 6 days  Attending Physician: Sosa Holder MD  Primary Care Provider: Hannah Andino MD        Subjective:     Principal Problem:Cerebrovascular accident (CVA) due to stenosis of cerebral artery    HPI:  Jigar Michaels is a 62 y.o. male that (in part)  has a past medical history of Diabetes mellitus; Hypertension; and Stroke.  Presents to [facility] Emergency Department complaining of generalized weakness.  Subacute onset 1 week ago with progressive worsening.  Last known to be normal several days ago.  Was found to be significantly worse this morning by the patient's family member reports significant worsening of the left upper and lower extremity chronic weakness from a previous stroke.  He is unable to communicate effectively and is coughing, which may be consistent with aspiration.  Denies fever, chills, or recent infection.  No recent hospitalizations.  No report of syncope or collapse.  Significantly decreased oral intake over the last few days.  Patient was refusing to come to the Hospital prior to this.  Unfortunately due to the current condition of the patient remained of the history is limited    In the emergency department routine laboratory studies, CT of the head, and a consultation to tele-neurology were performed.  There is concern for acute CVA.  He is being admitted for stroke workup and neurology consultation.  MRI of the morning.    Hospital medicine has been asked to admit for further evaluation and treatment.    Hospital Course:  MRI confirms acute CVA with concern for possible embolic cause. ECHO showed new CHF, therefore, Cardiology consulted.  Neurology consulted. Continue permissive HTN and will likely need rehab for placement. Will consult social service to begin discharge planning with  rehab placement.  Patient apparently aspirated after being fed some meat by wife on 4/28.  Became more hypoxic and transferred to ICU. Respiratory status continued to worsen requiring endotracheal intubation.     Interval History: BG at goal. Not much neuro improvement seen while off sedation for awhile. MRI suggests progression of stroke.     Review of Systems   Unable to perform ROS: Intubated     Objective:     Vital Signs (Most Recent):  Temp: 97.9 °F (36.6 °C) (05/02/18 0715)  Pulse: 88 (05/02/18 1708)  Resp: (!) 50 (05/02/18 1708)  BP: (!) 156/73 (05/02/18 1600)  SpO2: 95 % (05/02/18 1708) Vital Signs (24h Range):  Temp:  [97.9 °F (36.6 °C)-98.4 °F (36.9 °C)] 97.9 °F (36.6 °C)  Pulse:  [61-90] 88  Resp:  [15-50] 50  SpO2:  [94 %-99 %] 95 %  BP: (116-174)/(56-81) 156/73     Weight: 62.3 kg (137 lb 5.6 oz)  Body mass index is 22.86 kg/m².    Intake/Output Summary (Last 24 hours) at 05/02/18 1752  Last data filed at 05/02/18 1600   Gross per 24 hour   Intake            629.8 ml   Output              925 ml   Net           -295.2 ml      Physical Exam   Constitutional: He appears well-developed. No distress.   HENT:   Head: Normocephalic and atraumatic.   ETT in place   Eyes: Pupils are equal, round, and reactive to light.   Cardiovascular: Normal rate and regular rhythm.    Pulmonary/Chest: Effort normal. He has rales (distant).   On MV   Abdominal: Soft. Bowel sounds are normal. He exhibits no distension.   Musculoskeletal: Normal range of motion. He exhibits no edema.   Neurological: He is alert. He displays normal reflexes.   sedated   Skin: He is not diaphoretic.   Nursing note and vitals reviewed.      Significant Labs: All pertinent labs within the past 24 hours have been reviewed.    Significant Imaging: I have reviewed all pertinent imaging results/findings within the past 24 hours.  I have reviewed and interpreted all pertinent imaging results/findings within the past 24 hours.    Assessment/Plan:      *  Cerebrovascular accident (CVA) due to stenosis of cerebral artery    MRI showing acute lacunar-type infarctions involving the superior right terrence and in the left cerebral peduncle as above.  MRA with extensive diffuse cerebrovascular disease with multiple areas of high grade stenosis.  Neurology discussed findings with stroke service at main [place]. Recommended continuing optimized medical therapy  On clopidogrel, statin and ASA. Adjusting insulin for better BG control.    BP stable. Degree of LV systolic dysfunction is a limiting factor.  Vasopressor support as needed if hypotension.              Basilar artery stenosis/occlusion    As seen on MRA. This is a concerning finding and appears to have progressed despite supportive measures. He does have significant stenosis. Had no meaningful neuro recovery per our exam today while off sedation. I am concerned he will end up with trach and PEG. Neurologist, Pulmonologist and myself discussed with wife, two sons and daughter end of life measures vs trach/PEG. We recommend withdrawal of life saving intervention if no meaningful recovery appreciated by the end of this week.           On mechanically assisted ventilation    On day # 3 of vent. Famotidine for stress ulcer proph. Pulm on board for co-management.           Aspiration pneumonia    Agree with moxifloxacin as empiric treatment. On vent support. Tube feeds started. May need PEG as dysphagia is unlikely to resolve given extent of cerebrovascular disease.           Gross hematuria    May be from traumatic cuevas placement. Is on ASA and plavix for presenting stroke, which is needs at this time. Hematuria appears to be resolving. Will restart lovenox for DVT proph as he is at high risk          Atherosclerotic cerebrovascular disease    Seen on MRA. Likely 2/2 to poorly controlled DM2          Diffuse cerebral atrophy    Seen on MRI. Likely 2/2 chronic microvascular disease.           Cerebellar atrophy    Seen on  MRI. Likely 2/2 chronic microvascular disease.           Aspiration into airway    Choked on food being fed to him by wife on 4/28. Declined respiratory wise and now intubated.  Resp Cx not impressive. BCx thus far negative. 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  Cardiology consulted. Planning on ischemic workup with LHC vs NST pending improvement of renal fx  On ASA, clopidogrel and statin. Will start a BB          Acute renal failure    Last Creat we have in record is from 2012.  May have underlying CKD secondary to HTN and DM   Is to receive a dose of IV lasix today for rales suggesting pulm edema.   Is also on ASA supp 300 mg which patient currently needs  Avoid any other nephrotoxin as much as possible as he would be a poor candidate for RRT if he were to need it  Mcghee in place. Strict I/Os. Renal panel in AM          Hyperglycemia due to type 2 diabetes mellitus    Adjust insulin therapy for goal BG < 180          CVA, old, hemiparesis    As above          Type 2 diabetes mellitus with hyperglycemia, with long-term current use of insulin    Uncontrolled with hyperglycemia.  Elevated HgA1c to 8.6% despite insulin therapy at home. Dietary non compliance?  BG not at goal. Will add long acting insulin. Adjust as needed for goal BG < 180. Plan to start tube feeds today.           Hypertension    Difficult to allow permissive HTN given patient's LV systolic depression to 35% (per TTE), however he may no longer benefit from permissive HTN as he is > 72 hrs post new stroke. Is currently off antihypertensive therapy.           Other dysphagia    Apparent episode of aspiration 4/28. Has significant cerebrovascular disease and multiple strokes. Currently intubated. May need NG tube placed once extubated. Will re-consult speech when able to participate.           VTE Risk Mitigation         Ordered      enoxaparin injection 30 mg  Daily      05/02/18 1803     IP VTE LOW RISK PATIENT  Once      04/27/18 0248     Place sequential compression device  Until discontinued      04/27/18 0248        Plan of care discussed with wife, two sons and daughter.     Critical care time spent on the evaluation and treatment of severe organ dysfunction, review of pertinent labs and imaging studies, discussions with consulting providers and discussions with patient/family: > 35 minutes.    Sosa Tellez MD  Department of Hospital Medicine   [facility]

## 2018-05-02 NOTE — ASSESSMENT & PLAN NOTE
Choked on food being fed to him by wife on 4/28. Declined respiratory wise and now intubated.  Resp Cx not impressive. BCx thus far negative. On appropriate empiric abx Tx

## 2018-05-02 NOTE — PROGRESS NOTES
[facility]  Pulmonology  Progress Note    Patient Name: Jigar Michaels  MRN: [id number]  Admission Date: 4/26/2018  Hospital Length of Stay: 6 days  Code Status: Full Code  Attending Provider: Sosa Holder MD  Primary Care Provider: Hannah Andino MD   Principal Problem: Cerebrovascular accident (CVA) due to stenosis of cerebral artery    Subjective:     Interval History: Patient off sedation. Moves left foot to command. Downward nystagmus.   Review of Systems   Unable to perform ROS: Intubated     Objective:     Vital Signs (Most Recent):  Temp: 98.4 °F (36.9 °C) (05/02/18 0400)  Pulse: 65 (05/02/18 0745)  Resp: (!) 43 (05/02/18 0745)  BP: (!) 140/63 (05/02/18 0600)  SpO2: 99 % (05/02/18 0745) Vital Signs (24h Range):  Temp:  [98.1 °F (36.7 °C)-98.4 °F (36.9 °C)] 98.4 °F (36.9 °C)  Pulse:  [65-90] 65  Resp:  [15-43] 43  SpO2:  [94 %-99 %] 99 %  BP: (116-162)/(56-74) 140/63     Weight: 62.3 kg (137 lb 5.6 oz)  Body mass index is 22.86 kg/m².    Intake/Output Summary (Last 24 hours) at 05/02/18 0845  Last data filed at 05/02/18 0600   Gross per 24 hour   Intake            643.5 ml   Output             2100 ml   Net          -1456.5 ml      Physical Exam   Constitutional: He appears well-developed. No distress.   HENT:   Head: Normocephalic and atraumatic.   ETT in place   Eyes: Pupils are equal, round, and reactive to light.   Cardiovascular: Normal rate and regular rhythm.    Pulmonary/Chest: Effort normal. He has rales (distant).   On MV   Abdominal: Soft. Bowel sounds are normal. He exhibits no distension.   Musculoskeletal: Normal range of motion. He exhibits no edema.   Neurological: He is alert. He displays normal reflexes.   Moves left foot to command.   Withdraws to pain.   Breathing over the ventilator.   Unable to move upper extremities.    Skin: He is not diaphoretic.   Nursing note and vitals reviewed.      Significant Labs: All pertinent labs within the past 24 hours have been  reviewed.    Significant Imaging: I have reviewed all pertinent imaging results/findings within the past 24 hours.  I have reviewed and interpreted all pertinent imaging results/findings within the past 24 hours.    Assessment/Plan:     * Cerebrovascular accident (CVA) due to stenosis of cerebral artery    Per Neurology. MRI. Worry for worsening Neuro status.         On mechanically assisted ventilation    Intubated for aspiration PNA. Seen this morning on ventilator. Nystagmus on exam. Concern for evolving stroke.   Low Vt Ventilation.   Wean as tolerated.   Neuro function will dictate liberation from the ventilator.   Consider goals of care.   May need tach if family would like to continue care.         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    Good diuresis with Lasix. Will continue today.         Acute renal failure    Monitor UOP.         Aspiration pneumonia    WBC normal. Can likely switch to Avelox. Complete 7 day course.                Dulce Alegre MD  Pulmonology  [facility]

## 2018-05-02 NOTE — PHYSICIAN QUERY
"PT Name: Jigar Michaels  MR #: [id number]     Physician Query Form - Documentation Clarification   CDS: Gloria Donovan RN, CCDS       Contact information: [email or web address]    This form is a permanent document in the medical record.     Query Date: May 2, 2018    By submitting this query, we are merely seeking further clarification of documentation. Please utilize your independent clinical judgment when addressing the question(s) below.    The Medical record reflects the following:    Supporting Clinical Findings Location in Medical Record     · Differential diagnosis includes CVA, TIA, subarachnoid hemorrhage, complex migraine, seizure, encephalopathy, and/or tumor     4/27-H&P     MRI confirms acute CVA with concern for possible embolic cause.    Getting more sob and wob.  Mentation worsening?  CVA, asp PNA -respiratory failure  Encephalopathy.  Impending resp failure. Elective intubation is advised.   4/28-Hospital Med PN      4/30-eICU notes                                                                          Doctor, Please specify diagnosis or diagnoses associated with above clinical findings.    Please clarify if diagnosis of Encephalopathy is ruled in or ruled out.  If ruled in, please specify "Type" of Encephalopathy.    Provider Use Only       [ x  ] Encephalopathy is ruled in   Please specify Type:__stroke______________________     [   ] Encephalopathy is ruled out     [   ] Other clarification (please specify)____________________________                                                                                                          [   ] Clinically undetermined            "

## 2018-05-02 NOTE — SUBJECTIVE & OBJECTIVE
Interval History: BG at goal. Not much neuro improvement seen while off sedation for awhile. MRI suggests progression of stroke.     Review of Systems   Unable to perform ROS: Intubated     Objective:     Vital Signs (Most Recent):  Temp: 97.9 °F (36.6 °C) (05/02/18 0715)  Pulse: 88 (05/02/18 1708)  Resp: (!) 50 (05/02/18 1708)  BP: (!) 156/73 (05/02/18 1600)  SpO2: 95 % (05/02/18 1708) Vital Signs (24h Range):  Temp:  [97.9 °F (36.6 °C)-98.4 °F (36.9 °C)] 97.9 °F (36.6 °C)  Pulse:  [61-90] 88  Resp:  [15-50] 50  SpO2:  [94 %-99 %] 95 %  BP: (116-174)/(56-81) 156/73     Weight: 62.3 kg (137 lb 5.6 oz)  Body mass index is 22.86 kg/m².    Intake/Output Summary (Last 24 hours) at 05/02/18 1752  Last data filed at 05/02/18 1600   Gross per 24 hour   Intake            629.8 ml   Output              925 ml   Net           -295.2 ml      Physical Exam   Constitutional: He appears well-developed. No distress.   HENT:   Head: Normocephalic and atraumatic.   ETT in place   Eyes: Pupils are equal, round, and reactive to light.   Cardiovascular: Normal rate and regular rhythm.    Pulmonary/Chest: Effort normal. He has rales (distant).   On MV   Abdominal: Soft. Bowel sounds are normal. He exhibits no distension.   Musculoskeletal: Normal range of motion. He exhibits no edema.   Neurological: He is alert. He displays normal reflexes.   sedated   Skin: He is not diaphoretic.   Nursing note and vitals reviewed.      Significant Labs: All pertinent labs within the past 24 hours have been reviewed.    Significant Imaging: I have reviewed all pertinent imaging results/findings within the past 24 hours.  I have reviewed and interpreted all pertinent imaging results/findings within the past 24 hours.

## 2018-05-02 NOTE — PHYSICIAN QUERY
PT Name: Jigar Michaels  MR #: [id number]  Physician Query Form - Renal Clarification     CDS: Gloria Donovan RN, CCDS       Contact information: [email or web address]    This form is a permanent document in the medical record.     QueryDate: May 2, 2018    By submitting this query, we are merely seeking further clarification of documentation. Please utilize your independent clinical judgment when addressing the question(s) below.    The Medical record contains the following:   Indicator Supporting Clinical Findings Location in Medical Record    Kidney (Renal) Insufficiency     X Kidney (Renal) Failure / Injury Acute renal failure  Last Creat we have in record is from 2012.  May have underlying CKD secondary to HTN and DM   Is to receive a dose of IV lasix today for rales suggesting pulm edema.   Is also on ASA supp 300 mg which patient currently needs  Avoid any other nephrotoxin as much as possible as he would be a poor candidate for RRT if he were to need it  Mcghee in place. Strict I/Os. Renal panel in AM 5/1-PN    Nephrotoxic Agents     X  X BUN/Creatinine GFR BUN=23, Cr=2.2, GFR=31  BUN=44, Cr=2.4, GFR=28 4/26-Labs  5/2-Labs   X Urine: Casts         Eosinophils Hyaline Casts=4  Granular Casts=2 4/26-Urinalysis    Dehydration      Nausea/Vomiting      Dialysis/CRRT      Treatment:      Other:          Provider, please specify the diagnosis or diagnoses associated with above clinical findings.    [ x] Acute Kidney Failure/Injury with Tubular Necrosis  [ ] Other Acute Kidney Failure/Injury (please specify): ____________  [ ] Acute on Chronic Renal Failure (please specify stage*): _____________  [ ] Chronic Kidney Disease (CKD) (please specify stage* below):    *[employer] Definitions:   Stage Description      eGFR (mL/min)   [ ] I  Slight kidney damage with normal or increased filtration  90+   [ ] II  Mildly reduced kidney function     60-89   [ ] III  Moderately reduced kidney function    30-59   [  ] IV  Severly reduced kidney function     15-29   [ ] V  Kidney failure, requiring transplant or dialysis   < 15  [ ] Other (please specify): _______________________________  [ ] Clinically Undetermined    Please document in your progress notes daily for the duration of treatment, until resolved, and include in your discharge summary.

## 2018-05-02 NOTE — ASSESSMENT & PLAN NOTE
Intubated for aspiration PNA. Seen this morning on ventilator. Nystagmus on exam. Concern for evolving stroke.   Low Vt Ventilation.   Wean as tolerated.   Neuro function will dictate liberation from the ventilator.   Consider goals of care.   May need tach if family would like to continue care.

## 2018-05-02 NOTE — PHYSICIAN QUERY
"PT Name: Jigar Michaels  MR #: [id number]     Physician Query Form - Documentation Clarification   CDS: Gloria Donovan RN, CCDS       Contact information: [email or web address]    This form is a permanent document in the medical record.     Query Date: May 2, 2018    By submitting this query, we are merely seeking further clarification of documentation. Please utilize your independent clinical judgment when addressing the question(s) below.    The Medical record reflects the following:    Supporting Clinical Findings Location in Medical Record     He had a echocardiogram performed today which shows newly diagnosed cardiomyopathy in comparison to remote echo done in 2012.     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  Newly diagnosed  Add beta blocker  No signs of volume overload  We'll discuss with family regarding further ischemic workup NST versus R/LHC     Hypertension     4/27-Cardiology Consult        4/27-Cardiology Consult              4/27-Cardiology Consult     Plans for further cardiac evaluation on hold pending recovery. No further recommendations at this time and continue supportive care.    CONCLUSIONS     1 - Moderately depressed left ventricular systolic function (EF 35-40%).     2 - Concentric hypertrophy.     3 - Impaired LV relaxation, elevated LAP (grade 2 diastolic dysfunction).     4 - Mild to moderate aortic regurgitation.        5/1-Cardiology PN        4/27-ECHO                                                                          Doctor, Please specify diagnosis or diagnoses associated with above clinical findings.    Please clarify "Type" of Cardiomyopathy.    Provider Use Only     [   ] Arteriosclerotic   [   ] Hypertensive   [   ] Ischemic   [   ] Non-Ischemic   [   ] Dilated   [   ] Other (please specify)______________________________                                      [  x ] Clinically undetermined            "

## 2018-05-02 NOTE — PROGRESS NOTES
[facility]  Neurology  Progress Note    Patient Name: Jigar Michaels  MRN: [id number]  Admission Date: 4/26/2018  Hospital Length of Stay: 6 days  Code Status: Full Code   Attending Provider: Sosa Holder MD  Primary Care Physician: Hannah Andino MD   Principal Problem:Cerebrovascular accident (CVA) due to stenosis of cerebral artery    Subjective:     Interval History: 61 y/o male with medical Hx as listed below comes to ED for weakness on left side. Pt has Hx of stroke with residual left sided weakness but family reports worsening of motor deficits. His wife states that he is able to walk and feed himself at baseline but his speech is slurred. He is adherent to medications according to wife. Pt has already had at least two strokes.     -4/28/18: Pt with severe coughing when eating.     -4/29/18: Transferred to ICU after episode aspiration while eating.     -4/30/18: Pt is more dysarthric.      -5/1/18: Intubated overnight. Off sedation pt is not responsive.    -5/2/18: Pt moving feet spontaneously but following commands.    Current Neurological Medications:     Current Facility-Administered Medications   Medication Dose Route Frequency Provider Last Rate Last Dose    albuterol-ipratropium 2.5mg-0.5mg/3mL nebulizer solution 3 mL  3 mL Nebulization Q4H PRN Alexander Florence MD   3 mL at 04/30/18 1759    aspirin suppository 300 mg  300 mg Rectal Daily Lul Wylie MD   300 mg at 05/02/18 1000    atorvastatin tablet 80 mg  80 mg Per OG tube Daily Cristóbal Batista MD   80 mg at 05/02/18 1000    chlorhexidine 0.12 % solution 15 mL  15 mL Mouth/Throat BID Cristóbal Batista MD   15 mL at 05/02/18 1000    clopidogrel tablet 75 mg  75 mg Per OG tube Daily Cristóbal Batista MD   75 mg at 05/02/18 1000    dextrose 50% injection 12.5 g  12.5 g Intravenous PRN Silavno Velasquez MD        famotidine (PF) injection 20 mg  20 mg Intravenous Daily Cristóbal Batista MD   20 mg at 05/02/18 1000    fentaNYL  2500 mcg in 0.9% sodium chloride 250 mL infusion premix (titrating)  25 mcg/hr Intravenous Continuous Silvano Velasquez MD   Stopped at 05/02/18 0900    fentaNYL injection 50 mcg  50 mcg Intravenous Q1H PRN Silvano Velasquez MD        glucagon (human recombinant) injection 1 mg  1 mg Intramuscular PRN Silvano Velasquez MD        insulin aspart U-100 pen 1-10 Units  1-10 Units Subcutaneous Q6H PRN Silvano Velasquez MD   2 Units at 05/02/18 0608    insulin detemir U-100 pen 5 Units  5 Units Subcutaneous QHS Sosa Holder MD   5 Units at 05/01/18 2219    labetalol injection 10 mg  10 mg Intravenous Q15 Min PRN Silvano Velasquez MD        metoprolol tartrate (LOPRESSOR) tablet 25 mg  25 mg Per OG tube BID Sosa Holder MD   25 mg at 05/02/18 1000    mineral oil enema 1 enema  1 enema Rectal Daily PRN Silvano Vleasquez MD        moxifloxacin tablet 400 mg  400 mg Per NG tube Daily Dulce Alegre MD        ondansetron disintegrating tablet 8 mg  8 mg Oral Q8H PRN Silvano Velasquez MD   8 mg at 04/28/18 0959    polyethylene glycol packet 17 g  17 g Per G Tube Daily Sosa Holder MD   17 g at 05/02/18 1000    promethazine suppository 25 mg  25 mg Rectal Q6H PRN Silvano Velasquez MD        senna-docusate 8.6-50 mg per tablet 1 tablet  1 tablet Per OG tube BID Sosa Holder MD   1 tablet at 05/02/18 1000    sodium chloride 0.9% bolus 500 mL  500 mL Intravenous Continuous PRN Silvano Velasquez MD        sodium chloride 0.9% bolus 500 mL  500 mL Intravenous Once Cristóbal Batista MD        sodium chloride 0.9% flush 3 mL  3 mL Intravenous Q8H Silvano Velasquez MD   3 mL at 05/02/18 0600       Review of Systems   Unable to perform ROS: Intubated     Objective:     Vital Signs (Most Recent):  Temp: 97.9 °F (36.6 °C) (05/02/18 0715)  Pulse: 85 (05/02/18 1600)  Resp: (!) 26 (05/02/18 1426)  BP: (!) 156/73 (05/02/18 1600)  SpO2: 96 % (05/02/18 1600) Vital Signs (24h  Range):  Temp:  [97.9 °F (36.6 °C)-98.4 °F (36.9 °C)] 97.9 °F (36.6 °C)  Pulse:  [61-90] 85  Resp:  [15-43] 26  SpO2:  [94 %-99 %] 96 %  BP: (116-174)/(56-81) 156/73     Weight: 62.3 kg (137 lb 5.6 oz)  Body mass index is 22.86 kg/m².    Physical Exam  Constitutional: Intubated  Head: Normocephalic.   Eyes: Right eye exhibits no discharge. Left eye exhibits no discharge.   Neck: Normal range of motion.   Cardiovascular: Normal rate and regular rhythm.    Pulmonary/Chest: Effort normal and breath sounds normal.   Abdominal: Soft. Bowel sounds are normal.   Musculoskeletal: He exhibits no edema.   Neurological: He is oriented to person, place, and time.   Skin: He is not diaphoretic.         NEUROLOGICAL EXAMINATION:      MENTAL STATUS   Intubated; unresponsive to verbal stimuli     CRANIAL NERVES      CN III, IV, VI   Right pupil: Size: 3 mm. Shape: regular. Sluggish reaction to light  Left pupil: Size: 3 mm. Shape: regular. Sluggish reaction to light.  Nystagmus: lateral nystagmus to left and right  Ocular bobbing        CN VII   Right facial weakness: none  Left facial weakness: central     Postive cough and gag reflex     MOTOR EXAM        Bilateral Babinski;   Upon noxious stimulation pt turns head towards stimuli       Significant Labs:   CBC:   Recent Labs  Lab 05/01/18  1758   WBC 6.27   HGB 11.0*   HCT 31.0*        CMP:   Recent Labs  Lab 05/01/18  0344 05/02/18  0145   * 172*    144   K 4.6 3.6   * 114*   CO2 16* 21*   BUN 40* 44*   CREATININE 2.4* 2.4*   CALCIUM 8.4* 8.6*   ALBUMIN  --  2.0*   ANIONGAP 15 9   EGFRNONAA 28* 28*         Assessment and Plan:     61 y/o male with acute stroke     1. Stroke: acute areas of infarction on posterior circulation. Hx of previous strokes as well.           -MRA head shows significant basilar stenosis among other findings likely due to atheromatous disease. This can make him prone to other strokes and decompensation if brainstem is involved  more. Note that this pt has multiple co-morbidities including HF and his situation may change critically.For now high dose statin and dual antiplatelets through OGT           -MRI shows some progression of bilateral brainstem infarctions. Pt with no significant neurological improvements when assessed after sedation has been held.    Had meeting with wife and children. Possible scenarios discussed including no recovery from his acute medical problems. This would mean that pt may end up with PEG and tracheostomy. They understand but will continue to assess clinically daily and update them as they make decision accordingly.    Active Diagnoses:    Diagnosis Date Noted POA    PRINCIPAL PROBLEM:  Cerebrovascular accident (CVA) due to stenosis of cerebral artery [I63.50] 04/27/2018 Yes    Cerebellar atrophy [G31.9] 05/01/2018 Yes    Diffuse cerebral atrophy [G31.9] 05/01/2018 Yes    Atherosclerotic cerebrovascular disease [I67.2] 05/01/2018 Yes    On mechanically assisted ventilation [Z99.11] 05/01/2018 Not Applicable    Basilar artery stenosis/occlusion [I65.1] 05/01/2018 Yes    Gross hematuria [R31.0] 05/01/2018 Yes    Aspiration into airway [T17.908A] 04/30/2018 No    Hyperglycemia due to type 2 diabetes mellitus [E11.65] 04/27/2018 Yes    Acute renal failure [N17.9] 04/27/2018 Yes    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 [I50.41] 04/27/2018 Yes    Aspiration pneumonia [J69.0] 12/17/2012 Yes    Hypertension [I10] 12/09/2012 Yes    Type 2 diabetes mellitus with hyperglycemia, with long-term current use of insulin [E11.65, Z79.4] 12/09/2012 Not Applicable    CVA, old, hemiparesis [I69.359] 12/09/2012 Not Applicable    Other dysphagia [R13.19] 12/08/2012 Yes      Problems Resolved During this Admission:    Diagnosis Date Noted Date Resolved POA       VTE Risk Mitigation         Ordered     IP VTE LOW RISK PATIENT  Once      04/27/18 0248     Place sequential compression device  Until  discontinued      04/27/18 0248          Lul Wylie MD  Neurology  [facility]

## 2018-05-02 NOTE — SUBJECTIVE & OBJECTIVE
Interval History: Patient off sedation. Moves left foot to command. Downward nystagmus.   Review of Systems   Unable to perform ROS: Intubated     Objective:     Vital Signs (Most Recent):  Temp: 98.4 °F (36.9 °C) (05/02/18 0400)  Pulse: 65 (05/02/18 0745)  Resp: (!) 43 (05/02/18 0745)  BP: (!) 140/63 (05/02/18 0600)  SpO2: 99 % (05/02/18 0745) Vital Signs (24h Range):  Temp:  [98.1 °F (36.7 °C)-98.4 °F (36.9 °C)] 98.4 °F (36.9 °C)  Pulse:  [65-90] 65  Resp:  [15-43] 43  SpO2:  [94 %-99 %] 99 %  BP: (116-162)/(56-74) 140/63     Weight: 62.3 kg (137 lb 5.6 oz)  Body mass index is 22.86 kg/m².    Intake/Output Summary (Last 24 hours) at 05/02/18 0845  Last data filed at 05/02/18 0600   Gross per 24 hour   Intake            643.5 ml   Output             2100 ml   Net          -1456.5 ml      Physical Exam   Constitutional: He appears well-developed. No distress.   HENT:   Head: Normocephalic and atraumatic.   ETT in place   Eyes: Pupils are equal, round, and reactive to light.   Cardiovascular: Normal rate and regular rhythm.    Pulmonary/Chest: Effort normal. He has rales (distant).   On MV   Abdominal: Soft. Bowel sounds are normal. He exhibits no distension.   Musculoskeletal: Normal range of motion. He exhibits no edema.   Neurological: He is alert. He displays normal reflexes.   Moves left foot to command.   Withdraws to pain.   Breathing over the ventilator.   Unable to move upper extremities.    Skin: He is not diaphoretic.   Nursing note and vitals reviewed.      Significant Labs: All pertinent labs within the past 24 hours have been reviewed.    Significant Imaging: I have reviewed all pertinent imaging results/findings within the past 24 hours.  I have reviewed and interpreted all pertinent imaging results/findings within the past 24 hours.

## 2018-05-02 NOTE — PLAN OF CARE
Problem: Patient Care Overview  Goal: Plan of Care Review  Outcome: Ongoing (interventions implemented as appropriate)  Patient remains on ventilator via endotracheal tube with low dose Propofol and Fentanyl for sedation. He periodically responds to tactile or painful stimulus by open his eyes or extending his extremities. He does not follow commands. He received a MRI of his head per Neurology. No further orders followed. He has been having hematuria this shift. Urology was consulted and anticoagulants were discontinued. His family have been at the bedside throughout the shift and updated on his condition and planning. He sustained no injury, fall or trauma this shift.

## 2018-05-02 NOTE — PROGRESS NOTES
05/02/18 0745   Patient Assessment/Suction   Level of Consciousness (AVPU) responds to pain   Respiratory Effort Normal;Unlabored   Expansion/Accessory Muscles/Retractions expansion symmetric   All Lung Fields Breath Sounds clear   Rhythm/Pattern, Respiratory artificial airway;ventilator assisted   Cough Frequency with stimulation   Cough Type fair   PRE-TX-O2-ETCO2   O2 Device (Oxygen Therapy) ventilator   Oxygen Concentration (%) 40   SpO2 99 %   Pulse Oximetry Type Continuous   $ Pulse Oximetry - Multiple Charge Pulse Oximetry - Multiple   Pulse 65   Resp (!) 43       Airway - Non-Surgical 04/30/18 2357 Endotracheal Tube   Placement Date/Time: 04/30/18 2357   Method of Intubation: Bougie Intubation  Inserted by: (c) Anesthesia MD  Airway Device: Endotracheal Tube  Blade: Twyla #3  Airway Device Size: 7.0  Style: Cuffed  Placement Verified By: Auscultation  Findings ...   Secured at 25 cm   Measured At Lips   Secured Location Left  (moved from left to center)   Secured by Commercial tube morris   Bite Block none   Site Condition Cool;Dry   Status Intact;Secured;Patent   Site Assessment Clean;Dry   Vent Select   Conventional Vent Y   $ Ventilator Subsequent 1   Charged w/in last 24h YES   Preset Conventional Ventilator Settings   Vent ID [id number]   Vent Type Servo i   Vent Mode PRVC   Humidity HME   Set Rate 15 bmp   Vt Set 450 mL   PEEP/CPAP 5 cmH20   Insp Time 1 Sec(s)   Insp Rise Time  0.3 %   Patient Ventilator Parameters   Resp Rate Total 15 br/min   Peak Airway Pressure 21.9 cmH2O   Mean Airway Pressure 8.7 cmH20   Exhaled Vt 444 mL   Total Ve 6.6 mL   Conventional Ventilator Alarms   Alarms On Y   Ve High Alarm 40 L/min   Ve Low Alarm 5 L/min   Resp Rate High Alarm 40 br/min   Resp Rate Low Alarm 5   Ready to Wean/Extubation Screen   FIO2<=50 (chart decimal) 0.4   MV<16L (chart vol.) 6.6   PEEP <=8 (chart #) 5   Ready to Wean Parameters   F/VT Ratio<105 (RSBI) (!) 96.85   Airway Safety   Ambu bag with the  patient? Yes, Adult Ambu   Is mask with the patient? Yes, Adult Mask

## 2018-05-02 NOTE — PLAN OF CARE
Problem: Patient Care Overview  Goal: Plan of Care Review  Outcome: Ongoing (interventions implemented as appropriate)  Pt continues in the ICU.  Pt is on a Vent.  Fentanyl at 25mcg/hr.  Diprivan at 5mcg/kg/hr.  Novsource tube feeding started at 10ml/hr.  0600, it was increased to 20ml/hr.  No residual. Mcghee with dark red urine.  Pt turned every 2 hours.  Last blood glucose is 177.  Wife at bedside.  No falls/ injuries this shift.

## 2018-05-02 NOTE — EICU
eICU Note : Ventilator Rounds Vent day# 1  Input/Output:-0549  CXR: Endotracheal tube in place, Infiltrate right mid and lower lobe  On Mechanical ventilator :settings:   Mode:PRVC   TV:450    RR 17 PEEP+5   FiO2  40     PIP 8.5  SaO2 %  Last ABG7.34/257/16.2/100  Checked Readiness to wean: P/F ratio    257     PEEP+5   Sedation on/off: Fentanyl and Propofol     Problem:Stroke with Aspiration on a vent     Pertinent History and labs reviewed :62-year-old male with history of diabetes mellitus, presented with left upper and lower extremity weakness.patient had a history of aspiration on the floor on Clindamycin     Treatment /Intervention given:SBT trial in AM , was tried today but became tachypneic         Teresa Preston M.D  eICU Physician

## 2018-05-02 NOTE — ASSESSMENT & PLAN NOTE
MRI showing acute lacunar-type infarctions involving the superior right terrence and in the left cerebral peduncle as above.  MRA with extensive diffuse cerebrovascular disease with multiple areas of high grade stenosis.  Neurology discussed findings with stroke service at main campus. Recommended continuing optimized medical therapy  On clopidogrel, statin and ASA. Adjusting insulin for better BG control.    BP stable. Degree of LV systolic dysfunction is a limiting factor.  Vasopressor support as needed if hypotension.

## 2018-05-02 NOTE — PT/OT/SLP PROGRESS
Physical Therapy      Patient Name:  Jigar Michaels   MRN:  [id number]    Patient not seen today secondary to (PT eval orders for ROM protocol while intubated/sedated, however per nursing pt remains the same and did not pass the Early Mobility Move Screen). Will follow-up tomorrow.    Stephanie Resendez, PT

## 2018-05-03 PROBLEM — Z71.89 GOALS OF CARE, COUNSELING/DISCUSSION: Status: ACTIVE | Noted: 2018-01-01

## 2018-05-03 NOTE — EICU
eICU Note : Ventilator Rounds Vent day# 2  Input/Output:-817  CXR: Infiltrates R mid and LL  On Mechanical ventilator :settings:   Mode:PRVC      RR30  PEEP +5  FiO2 30%      PIP  SaO2  Last AB.45/34/92/23/97%  Checked Readiness to wean: P/F 229 F/TV   PEEP+5   Sedation on/off: Fentanyl   MRI: Posterior  circulation evolving infarct   Problem:    Pertinent History and labs reviewed :62-year-old male with history of diabetes mellitus, presented with left upper and lower extremity weakness.patient had a history of aspiration on the floor on Clindamycin       Treatment /Intervention given: CVA with neurodeficits limiting weaning        Teresa Preston M.D  eICU Physician

## 2018-05-03 NOTE — PROGRESS NOTES
RO assisted Dr Wylie today to provide letter for airline for daughter to change vacation plans to come here with family. RO received email from daughter received that she has the letter.

## 2018-05-03 NOTE — PROGRESS NOTES
[facility]  Pulmonology  Progress Note    Patient Name: Jigar Michaels  MRN: [id number]  Admission Date: 4/26/2018  Hospital Length of Stay: 7 days  Code Status: Full Code  Attending Provider: Sosa Holder MD  Primary Care Provider: Hannah Andino MD   Principal Problem: Cerebrovascular accident (CVA) due to stenosis of cerebral artery    Subjective:     Interval History: Patient off sedation.  Not following commands this AM.    Review of Systems   Unable to perform ROS: Intubated     Objective:     Vital Signs (Most Recent):  Temp: 99.2 °F (37.3 °C) (05/03/18 0730)  Pulse: 88 (05/03/18 0627)  Resp: 17 (05/03/18 0627)  BP: (!) 159/73 (05/03/18 0602)  SpO2: 99 % (05/03/18 0807) Vital Signs (24h Range):  Temp:  [98.7 °F (37.1 °C)-99.8 °F (37.7 °C)] 99.2 °F (37.3 °C)  Pulse:  [] 88  Resp:  [15-58] 17  SpO2:  [94 %-99 %] 99 %  BP: (117-192)/(55-93) 159/73     Weight: 62.3 kg (137 lb 5.6 oz)  Body mass index is 22.86 kg/m².    Intake/Output Summary (Last 24 hours) at 05/03/18 0923  Last data filed at 05/03/18 0700   Gross per 24 hour   Intake             1070 ml   Output             2000 ml   Net             -930 ml      Physical Exam   Constitutional: He appears well-developed. No distress.   HENT:   Head: Normocephalic and atraumatic.   ETT in place   Eyes: Pupils are equal, round, and reactive to light.   Cardiovascular: Normal rate and regular rhythm.    Pulmonary/Chest: Effort normal. He has rales (distant).   On MV   Abdominal: Soft. Bowel sounds are normal. He exhibits no distension.   Musculoskeletal: Normal range of motion. He exhibits no edema.   Neurological: He is alert. He displays normal reflexes.     Withdraws to pain.   Breathing over the ventilator.   Unable to move upper extremities.    Skin: He is not diaphoretic.   Nursing note and vitals reviewed.      Significant Labs: All pertinent labs within the past 24 hours have been reviewed.    Significant Imaging: I have reviewed all  pertinent imaging results/findings within the past 24 hours.  I have reviewed and interpreted all pertinent imaging results/findings within the past 24 hours.    Assessment/Plan:     * Cerebrovascular accident (CVA) due to stenosis of cerebral artery    Per Neurology. No clinical improvement thus far.         Goals of care, counseling/discussion    Concern that patient will not have improvement from CVA. Discuss with family current options of PEG/trach vs comfort care.         On mechanically assisted ventilation    Intubated for aspiration PNA. Seen this morning on ventilator. Nystagmus on exam. Low Vt Ventilation.   Wean as tolerated.   Neuro function will dictate liberation from the ventilator.   Consider goals of care.   May need trach if family would like to continue care.         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 Would try to keep even. May need a dose of Lasix.        Acute renal failure    Monitor UOP.         Aspiration pneumonia    WBC normal. Can switch to Avelox. Complete 7 day course.         Type 2 diabetes mellitus with hyperglycemia, with long-term current use of insulin    BG goal < 180          Critical Care time: 35 minutes    Critical Care time for the evaluation and treatment of severe organ dysfunction, review of pertinent labs and imaging studies, discussions with primary team and discussions with family.     Family updated at bedside.    Continue goals of care discussions.        Dulce Alegre MD  Pulmonology  [facility]

## 2018-05-03 NOTE — ASSESSMENT & PLAN NOTE
May be from traumatic cuevas placement. Is on ASA and plavix for presenting stroke, which is needs at this time. Resolved. Restarted lovenox for DVT proph as he is at high risk

## 2018-05-03 NOTE — ADDENDUM NOTE
Addendum  created 05/03/18 1600 by Bob Padron MD    Anesthesia Intra Blocks edited, Sign clinical note

## 2018-05-03 NOTE — PROGRESS NOTES
[facility] Hospital Medicine  Progress Note    Patient Name: Jigar Michaels  MRN: [id number]  Patient Class: IP- Inpatient   Admission Date: 4/26/2018  Length of Stay: 7 days  Attending Physician: Sosa Holder MD  Primary Care Provider: Hannah Andino MD        Subjective:     Principal Problem:Cerebrovascular accident (CVA) due to stenosis of cerebral artery    HPI:  Jigar Michaels is a 62 y.o. male that (in part)  has a past medical history of Diabetes mellitus; Hypertension; and Stroke.  Presents to [facility] Emergency Department complaining of generalized weakness.  Subacute onset 1 week ago with progressive worsening.  Last known to be normal several days ago.  Was found to be significantly worse this morning by the patient's family member reports significant worsening of the left upper and lower extremity chronic weakness from a previous stroke.  He is unable to communicate effectively and is coughing, which may be consistent with aspiration.  Denies fever, chills, or recent infection.  No recent hospitalizations.  No report of syncope or collapse.  Significantly decreased oral intake over the last few days.  Patient was refusing to come to the Hospital prior to this.  Unfortunately due to the current condition of the patient remained of the history is limited    In the emergency department routine laboratory studies, CT of the head, and a consultation to tele-neurology were performed.  There is concern for acute CVA.  He is being admitted for stroke workup and neurology consultation.  MRI of the morning.    Hospital medicine has been asked to admit for further evaluation and treatment.    Hospital Course:  MRI confirms acute CVA with concern for possible embolic cause. ECHO showed new CHF, therefore, Cardiology consulted.  Neurology consulted. Continue permissive HTN and will likely need rehab for placement. Will consult social service to begin discharge planning with  rehab placement.  Patient apparently aspirated after being fed some meat by wife on 4/28.  Became more hypoxic and transferred to ICU. Respiratory status continued to worsen requiring endotracheal intubation.     Interval History: off sedation > 24 hrs. Is unresponsive.     Review of Systems   Unable to perform ROS: Intubated     Objective:     Vital Signs (Most Recent):  Temp: 99.1 °F (37.3 °C) (05/03/18 1530)  Pulse: 79 (05/03/18 1620)  Resp: (!) 58 (05/03/18 1620)  BP: (!) 141/64 (05/03/18 1602)  SpO2: 97 % (05/03/18 1620) Vital Signs (24h Range):  Temp:  [98.7 °F (37.1 °C)-100.1 °F (37.8 °C)] 99.1 °F (37.3 °C)  Pulse:  [] 79  Resp:  [0-58] 58  SpO2:  [94 %-99 %] 97 %  BP: (116-192)/(55-93) 141/64     Weight: 62.3 kg (137 lb 5.6 oz)  Body mass index is 22.86 kg/m².    Intake/Output Summary (Last 24 hours) at 05/03/18 1733  Last data filed at 05/03/18 1130   Gross per 24 hour   Intake              880 ml   Output              920 ml   Net              -40 ml      Physical Exam   Constitutional: He appears well-developed. No distress.   HENT:   Head: Normocephalic and atraumatic.   ETT in place   Eyes: Pupils are equal, round, and reactive to light.   Cardiovascular: Normal rate and regular rhythm.    Pulmonary/Chest: Effort normal. He has no rales.   On MV   Abdominal: Soft. Bowel sounds are normal. He exhibits no distension.   Musculoskeletal: Normal range of motion. He exhibits no edema.   Neurological:    unresponsive to verbal or painful stimuli. Positive babinski sign. Decreased DTR. Downward gaze. Pupils reactive to light but sluggish. Some clonus to feet, but very minimal   Skin: He is not diaphoretic.   Nursing note and vitals reviewed.      Significant Labs: All pertinent labs within the past 24 hours have been reviewed.    Significant Imaging: I have reviewed all pertinent imaging results/findings within the past 24 hours.  I have reviewed and interpreted all pertinent imaging results/findings  within the past 24 hours.    Assessment/Plan:      * Cerebrovascular accident (CVA) due to stenosis of cerebral artery    MRI showing acute lacunar-type infarctions involving the superior right terrence and in the left cerebral peduncle as above.  MRA with extensive diffuse cerebrovascular disease with multiple areas of high grade stenosis.  Neurology discussed findings with stroke service at main [place]. Recommended continuing optimized medical therapy  On clopidogrel, statin and ASA. Adjusting insulin for better BG control.    BP stable. Degree of LV systolic dysfunction is a limiting factor.  Vasopressor support as needed if hypotension.              Basilar artery stenosis/occlusion    As seen on MRA. This is a concerning finding and appears to have progressed despite supportive measures. He does have significant stenosis. Had no meaningful neuro recovery per our exam today while off sedation > 24 hrs.Would not recommend trach and PEG. Neurologist, Pulmonologist and myself discussed with wife, two sons and daughter end of life measures vs trach/PEG. We recommend withdrawal of life saving intervention if no meaningful recovery appreciated by the end of this week.           On mechanically assisted ventilation    On day # 4 of vent. Famotidine for stress ulcer proph. Pulm on board for co-management.           Aspiration pneumonia    Agree with moxifloxacin as empiric treatment. On vent support. Tube feeds started. Good gas exchange on minimal vent support          Gross hematuria    May be from traumatic cuevas placement. Is on ASA and plavix for presenting stroke, which is needs at this time. Resolved. Restarted lovenox for DVT proph as he is at high risk          Atherosclerotic cerebrovascular disease    Seen on MRA. Likely 2/2 to poorly controlled DM2          Diffuse cerebral atrophy    Seen on MRI. Likely 2/2 chronic microvascular disease.           Cerebellar atrophy    Seen on MRI. Likely 2/2 chronic  microvascular disease.           Aspiration into airway    Choked on food being fed to him by wife on 4/28. Declined respiratory wise and now intubated.  Resp Cx not impressive. BCx thus far negative. 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  Cardiology consulted. Planning on ischemic workup with LHC vs NST pending improvement of renal fx  On ASA, clopidogrel and statin. Will start a BB          Acute renal failure    Last Creat we have in record is from 2012.  May have underlying CKD secondary to HTN and DM   Is to receive a dose of IV lasix today for rales suggesting pulm edema.   Is also on ASA supp 300 mg which patient currently needs  Avoid any other nephrotoxin as much as possible as he would be a poor candidate for RRT if he were to need it  Mcghee in place. Strict I/Os. Renal panel in AM          Hyperglycemia due to type 2 diabetes mellitus    Adjust insulin therapy for goal BG < 180          CVA, old, hemiparesis    As above          Type 2 diabetes mellitus with hyperglycemia, with long-term current use of insulin    Uncontrolled with hyperglycemia.  Elevated HgA1c to 8.6% despite insulin therapy at home. Dietary non compliance?  BG not at goal. Will add long acting insulin. Adjust as needed for goal BG < 180. Plan to start tube feeds today.           Hypertension    Difficult to allow permissive HTN given patient's LV systolic depression to 35% (per TTE), however he may no longer benefit from permissive HTN as he is > 72 hrs post new stroke. Is currently off antihypertensive therapy.           Other dysphagia    Apparent episode of aspiration 4/28. Has significant cerebrovascular disease and multiple strokes. Currently intubated. May need NG tube placed once extubated. Will re-consult speech when able to participate.           VTE Risk Mitigation         Ordered     enoxaparin injection 30 mg   Daily      05/02/18 1803     IP VTE LOW RISK PATIENT  Once      04/27/18 0248     Place sequential compression device  Until discontinued      04/27/18 0248        Discussed at length with daughter.     Critical care time spent on the evaluation and treatment of severe organ dysfunction, review of pertinent labs and imaging studies, discussions with consulting providers and discussions with patient/family: > 35 minutes.    Sosa Tellez MD  Department of Hospital Medicine   [facility]

## 2018-05-03 NOTE — PROGRESS NOTES
" [facility]  Adult Nutrition  Progress Note    SUMMARY       Recommendations    Recommendation/Intervention:   1. Current TF adequate to meet needs   2. Aid with BM as needed (last BM noted 4/26)   3. RD to monitor    Goals: 1) Initiate nutrition support with 24 - 48 hours  Nutrition Goal Status: goal met  Communication of RD Recs: reviewed with RN      Reason for Assessment    Reason for Assessment: RD follow-up  Diagnosis: other (see comments) (Acute CVA, ARF)  Relevant Medical History: DM, Stroke, HTN  Interdisciplinary Rounds: did not attend  General Information Comments: Patient remains intubated. Propofol off. TF running at goal rate without issue. + ARF; neuro defecits. Possible withdrawal of support vs. PEG/Trache. Hx/o dysphagia prior to intubation.     Nutrition Discharge Planning: Too soon to determine. Will monitor/follow-up.     Nutrition Risk Screen    Nutrition Risk Screen: no indicators present    Nutrition/Diet History    Food Preferences: JEN Adventism food preferences.   Do you have any cultural, spiritual, Adventism conflicts, given your current situation?: JEN cultural, Adventism or ethnic food preferences  Factors Affecting Nutritional Intake: difficulty/impaired swallowing, NPO, on mechanical ventilation    Anthropometrics    Temp: 99.2 °F (37.3 °C)  Height Method: Measured  Height: 5' 5" (165.1 cm)  Height (inches): 65 in  Weight Method: Bed Scale  Weight: 62.3 kg (137 lb 5.6 oz)  Weight (lb): 137.35 lb  Ideal Body Weight (IBW), Male: 136 lb  % Ideal Body Weight, Male (lb): 99.37 lb  BMI (Calculated): 22.5  BMI Grade: 18.5-24.9 - normal       Lab/Procedures/Meds    Pertinent Labs Reviewed: reviewed  Pertinent Labs Comments: GFR 28 (improved).   Pertinent Medications Reviewed: reviewed  Pertinent Medications Comments: propofol, lasix, insulin, fentanyl    Physical Findings/Assessment    Overall Physical Appearance: edematous, nourished, on ventilator support (trace edema " 1+)  Tubes: orogastric tube  Oral/Mouth Cavity: WDL  Skin: edema, intact (Luigi Score 12)    Estimated/Assessed Needs    Weight Used For Calorie Calculations: 62.3 kg (137 lb 5.6 oz)  Energy Calorie Requirements (kcal): 1580 kcal  Energy Need Method: Fate State (1585)  Protein Requirements: 75g  Weight Used For Protein Calculations: 61.3 kg (135 lb 2.3 oz)     Fluid Need Method: RDA Method  RDA Method (mL): 1580  CHO Requirement: 180g      Nutrition Prescription Ordered    Current Diet Order: NPO  Current Nutrition Support Formula Ordered: Novasource Renal  Current Nutrition Support Rate Ordered: 30 (ml)  Current Nutrition Support Frequency Ordered: ml/hr  Oral Nutrition Supplement: beneprotein 1/day    Evaluation of Received Nutrient/Fluid Intake    Enteral Calories (kcal): 1465  Enteral Protein (gm): 71  Enteral (Free Water) Fluid (mL): 516  Other Calories (kcal): 103 (propofol)  % Kcal Needs: 93%  % Protein Needs: 95%  IV Fluid (mL): 1800  I/O: 1137/2125  Energy Calories Required: not meeting needs  Protein Required: not meeting needs  Fluid Required: other (see comments) (per MD)  Comments: LBM: 4/26  Tolerance: tolerating    Nutrition Risk    Level of Risk/Frequency of Follow-up:  (f/u 2x weekly)     Assessment and Plan    Nutrition Dx: Inadequate Energy Intake r/t mechanical ventilation as evidenced by NPO  Nutrition Dx status: Improved.       Monitor and Evaluation    Food and Nutrient Intake: energy intake, enteral nutrition intake  Food and Nutrient Adminstration: enteral and parenteral nutrition administration  Knowledge/Beliefs/Attitudes: food and nutrition knowledge/skill, beliefs and attitudes  Physical Activity and Function: nutrition-related ADLs and IADLs  Anthropometric Measurements: weight, weight change, body mass index  Biochemical Data, Medical Tests and Procedures: electrolyte and renal panel, gastrointestinal profile, glucose/endocrine profile, lipid profile  Nutrition-Focused Physical  Findings: overall appearance     Nutrition Follow-Up    RD Follow-up?: Yes

## 2018-05-03 NOTE — PT/OT/SLP PROGRESS
Physical Therapy      Patient Name:  Jigar Michaels   MRN:  [id number]    Pt continued to be intubated with no change in status. Will D/C PT orders for ROM while intubated/sedated.  Please re-consult skilled PT services when pt is appropriate. Thank you.       Stephanie Resendez, PT

## 2018-05-03 NOTE — ASSESSMENT & PLAN NOTE
As seen on MRA. This is a concerning finding and appears to have progressed despite supportive measures. He does have significant stenosis. Had no meaningful neuro recovery per our exam today while off sedation > 24 hrs.Would not recommend trach and PEG. Neurologist, Pulmonologist and myself discussed with wife, two sons and daughter end of life measures vs trach/PEG. We recommend withdrawal of life saving intervention if no meaningful recovery appreciated by the end of this week.

## 2018-05-03 NOTE — PROGRESS NOTES
Results for CARLOS ELIZALDE (MRN [id number]) as of 5/3/2018 05:32   Ref. Range 5/3/2018 05:04   POC PH Latest Ref Range: 7.35 - 7.45  7.467 (H)   POC PCO2 Latest Ref Range: 35 - 45 mmHg 33.8 (L)   POC PO2 Latest Ref Range: 80 - 100 mmHg 103 (H)   POC BE Latest Ref Range: -2 to 2 mmol/L 1   POC HCO3 Latest Ref Range: 24 - 28 mmol/L 24.4   POC SATURATED O2 Latest Ref Range: 95 - 100 % 98   POC TCO2 Latest Ref Range: 23 - 27 mmol/L 25   FiO2 Unknown 30   Vt Unknown 450   PiP Unknown 21   PEEP Unknown 5   Sample Unknown ARTERIAL   DelSys Unknown Adult Vent   Allens Test Unknown Pass   Site Unknown RR   Mode Unknown AC/PRVC   Rate Unknown 15

## 2018-05-03 NOTE — PROGRESS NOTES
05/03/18 0807   Patient Assessment/Suction   Level of Consciousness (AVPU) unresponsive   Respiratory Effort Normal;Unlabored   All Lung Fields Breath Sounds coarse;diminished   Rhythm/Pattern, Respiratory ventilator assisted   Suction Method in-line suction catheter (closed)   $ Suction Charges Inline Suction Procedure Stat Charge   Sputum Amount small   Sputum Color white-yellow   Sputum Consistency thick   PRE-TX-O2-ETCO2   O2 Device (Oxygen Therapy) ventilator   Oxygen Concentration (%) 30   SpO2 99 %   Pulse Oximetry Type Continuous   $ Pulse Oximetry - Multiple Charge Pulse Oximetry - Multiple       Airway - Non-Surgical 04/30/18 2357 Endotracheal Tube   Placement Date/Time: 04/30/18 2357   Method of Intubation: Bougie Intubation  Inserted by: (c) Anesthesia MD  Airway Device: Endotracheal Tube  Blade: Twyla #3  Airway Device Size: 7.0  Style: Cuffed  Placement Verified By: Auscultation  Findings ...   Secured at 25 cm   Measured At Lips   Secured Location Center  (moved from right to center)   Secured by Commercial tube morris   Bite Block none   Site Condition Dry   Status Intact;Secured;Patent   Site Assessment Clean;Dry   Cuff Pressure 24 cm H2O   Equipment Change   $ RT Equipment HME   Vent Select   Conventional Vent Y   $ Ventilator Subsequent 1   Charged w/in last 24h YES   Preset Conventional Ventilator Settings   Vent ID [id number]   Vent Type Servo i   Vent Mode PRVC   Set Rate 15 bmp   Vt Set 450 mL   PEEP/CPAP 5 cmH20   Insp Time 1 Sec(s)   Insp Rise Time  0.3 %   Patient Ventilator Parameters   Resp Rate Total 18 br/min   Peak Airway Pressure 21.5 cmH2O   Mean Airway Pressure 8.6 cmH20   Exhaled Vt 450 mL   Total Ve 8.9 mL   Conventional Ventilator Alarms   Ve High Alarm 40 L/min   Ve Low Alarm 4 L/min   Resp Rate High Alarm 40 br/min   Resp Rate Low Alarm 5   Ready to Wean/Extubation Screen   FIO2<=50 (chart decimal) 0.3   MV<16L (chart vol.) 8.9   PEEP <=8 (chart #) 5

## 2018-05-03 NOTE — ASSESSMENT & PLAN NOTE
Concern that patient will not have improvement from CVA. Discuss with family current options of PEG/trach vs comfort care.

## 2018-05-03 NOTE — SUBJECTIVE & OBJECTIVE
Interval History: off sedation > 24 hrs. Is unresponsive.     Review of Systems   Unable to perform ROS: Intubated     Objective:     Vital Signs (Most Recent):  Temp: 99.1 °F (37.3 °C) (05/03/18 1530)  Pulse: 79 (05/03/18 1620)  Resp: (!) 58 (05/03/18 1620)  BP: (!) 141/64 (05/03/18 1602)  SpO2: 97 % (05/03/18 1620) Vital Signs (24h Range):  Temp:  [98.7 °F (37.1 °C)-100.1 °F (37.8 °C)] 99.1 °F (37.3 °C)  Pulse:  [] 79  Resp:  [0-58] 58  SpO2:  [94 %-99 %] 97 %  BP: (116-192)/(55-93) 141/64     Weight: 62.3 kg (137 lb 5.6 oz)  Body mass index is 22.86 kg/m².    Intake/Output Summary (Last 24 hours) at 05/03/18 1733  Last data filed at 05/03/18 1130   Gross per 24 hour   Intake              880 ml   Output              920 ml   Net              -40 ml      Physical Exam   Constitutional: He appears well-developed. No distress.   HENT:   Head: Normocephalic and atraumatic.   ETT in place   Eyes: Pupils are equal, round, and reactive to light.   Cardiovascular: Normal rate and regular rhythm.    Pulmonary/Chest: Effort normal. He has no rales.   On MV   Abdominal: Soft. Bowel sounds are normal. He exhibits no distension.   Musculoskeletal: Normal range of motion. He exhibits no edema.   Neurological:    unresponsive to verbal or painful stimuli. Positive babinski sign. Decreased DTR. Downward gaze. Pupils reactive to light but sluggish. Some clonus to feet, but very minimal   Skin: He is not diaphoretic.   Nursing note and vitals reviewed.      Significant Labs: All pertinent labs within the past 24 hours have been reviewed.    Significant Imaging: I have reviewed all pertinent imaging results/findings within the past 24 hours.  I have reviewed and interpreted all pertinent imaging results/findings within the past 24 hours.

## 2018-05-03 NOTE — SUBJECTIVE & OBJECTIVE
Interval History: Patient off sedation.  Not following commands this AM.    Review of Systems   Unable to perform ROS: Intubated     Objective:     Vital Signs (Most Recent):  Temp: 99.2 °F (37.3 °C) (05/03/18 0730)  Pulse: 88 (05/03/18 0627)  Resp: 17 (05/03/18 0627)  BP: (!) 159/73 (05/03/18 0602)  SpO2: 99 % (05/03/18 0807) Vital Signs (24h Range):  Temp:  [98.7 °F (37.1 °C)-99.8 °F (37.7 °C)] 99.2 °F (37.3 °C)  Pulse:  [] 88  Resp:  [15-58] 17  SpO2:  [94 %-99 %] 99 %  BP: (117-192)/(55-93) 159/73     Weight: 62.3 kg (137 lb 5.6 oz)  Body mass index is 22.86 kg/m².    Intake/Output Summary (Last 24 hours) at 05/03/18 0923  Last data filed at 05/03/18 0700   Gross per 24 hour   Intake             1070 ml   Output             2000 ml   Net             -930 ml      Physical Exam   Constitutional: He appears well-developed. No distress.   HENT:   Head: Normocephalic and atraumatic.   ETT in place   Eyes: Pupils are equal, round, and reactive to light.   Cardiovascular: Normal rate and regular rhythm.    Pulmonary/Chest: Effort normal. He has rales (distant).   On MV   Abdominal: Soft. Bowel sounds are normal. He exhibits no distension.   Musculoskeletal: Normal range of motion. He exhibits no edema.   Neurological: He is alert. He displays normal reflexes.     Withdraws to pain.   Breathing over the ventilator.   Unable to move upper extremities.    Skin: He is not diaphoretic.   Nursing note and vitals reviewed.      Significant Labs: All pertinent labs within the past 24 hours have been reviewed.    Significant Imaging: I have reviewed all pertinent imaging results/findings within the past 24 hours.  I have reviewed and interpreted all pertinent imaging results/findings within the past 24 hours.

## 2018-05-03 NOTE — PROGRESS NOTES
[facility] [place]  Neurology  Progress Note    Patient Name: Jigar Michaels  MRN: [id number]  Admission Date: 4/26/2018  Hospital Length of Stay: 7 days  Code Status: Full Code   Attending Provider: Sosa Holder MD  Primary Care Physician: Hannah Andino MD   Principal Problem:Cerebrovascular accident (CVA) due to stenosis of cerebral artery    Subjective:     Interval History:63 y/o male with medical Hx as listed below comes to ED for weakness on left side. Pt has Hx of stroke with residual left sided weakness but family reports worsening of motor deficits. His wife states that he is able to walk and feed himself at baseline but his speech is slurred. He is adherent to medications according to wife. Pt has already had at least two strokes.     -4/28/18: Pt with severe coughing when eating.     -4/29/18: Transferred to ICU after episode aspiration while eating.     -4/30/18: Pt is more dysarthric.      -5/1/18: Intubated overnight. Off sedation pt is not responsive.     -5/2/18: Pt moving feet spontaneously but following commands.     -5/3/18: Remains off sedation. Has not been following commands.    Current Neurological Medications:    Current Facility-Administered Medications   Medication Dose Route Frequency Provider Last Rate Last Dose    albuterol-ipratropium 2.5mg-0.5mg/3mL nebulizer solution 3 mL  3 mL Nebulization Q4H PRN Alexander Florence MD   3 mL at 04/30/18 1759    aspirin tablet 325 mg  325 mg Per OG tube Daily Sosa Holder MD   325 mg at 05/03/18 1015    atorvastatin tablet 80 mg  80 mg Per OG tube Daily Cristóbal Batista MD   80 mg at 05/03/18 0954    chlorhexidine 0.12 % solution 15 mL  15 mL Mouth/Throat BID Cristóbal Batista MD   15 mL at 05/03/18 0954    clopidogrel tablet 75 mg  75 mg Per OG tube Daily Cristóbal Batista MD   75 mg at 05/03/18 0954    dextrose 50% injection 12.5 g  12.5 g Intravenous PRN Silvano Velasquez MD        enoxaparin injection 30 mg  30 mg Subcutaneous  Daily Sosa Holder MD        famotidine (PF) injection 20 mg  20 mg Intravenous Daily Cristóbal Batista MD   20 mg at 05/03/18 0954    fentaNYL 2500 mcg in 0.9% sodium chloride 250 mL infusion premix (titrating)  25 mcg/hr Intravenous Continuous Silvano Velasuqez MD   Stopped at 05/02/18 0900    fentaNYL injection 50 mcg  50 mcg Intravenous Q1H PRN Silvano Velasquez MD   50 mcg at 05/03/18 0234    glucagon (human recombinant) injection 1 mg  1 mg Intramuscular PRN Silvano Velasquez MD        insulin aspart U-100 pen 1-10 Units  1-10 Units Subcutaneous Q6H PRN Silvano Velasquez MD   6 Units at 05/03/18 1150    insulin detemir U-100 pen 5 Units  5 Units Subcutaneous QHS Sosa Holder MD   5 Units at 05/02/18 2107    labetalol injection 10 mg  10 mg Intravenous Q15 Min PRN Silvano Velasquez MD   10 mg at 05/03/18 0236    metoprolol tartrate (LOPRESSOR) tablet 25 mg  25 mg Per OG tube BID Sosa Holder MD   25 mg at 05/03/18 0954    mineral oil enema 1 enema  1 enema Rectal Daily PRN Silvano Velasquez MD        moxifloxacin tablet 400 mg  400 mg Per NG tube Daily Dulce Alegre MD   400 mg at 05/03/18 0954    ondansetron disintegrating tablet 8 mg  8 mg Oral Q8H PRN Silvano Velasquez MD   8 mg at 04/28/18 0959    polyethylene glycol packet 17 g  17 g Per G Tube Daily Sosa Holder MD   17 g at 05/03/18 0954    promethazine suppository 25 mg  25 mg Rectal Q6H PRN Silvano Velasquez MD        senna-docusate 8.6-50 mg per tablet 1 tablet  1 tablet Per OG tube BID Sosa Holder MD   1 tablet at 05/03/18 0954    sodium chloride 0.9% bolus 500 mL  500 mL Intravenous Continuous PRN Silvano Velasquez MD        sodium chloride 0.9% bolus 500 mL  500 mL Intravenous Once Cristóbal Batista MD        sodium chloride 0.9% flush 3 mL  3 mL Intravenous Q8H Silvano Velasquez MD   10 mL at 05/03/18 0601       Review of Systems   Unable to obtain; pt intubated    Objective:      Vital Signs (Most Recent):  Temp: 100.1 °F (37.8 °C) (05/03/18 1130)  Pulse: 74 (05/03/18 1222)  Resp: 15 (05/03/18 1222)  BP: (!) 157/73 (05/03/18 1202)  SpO2: 96 % (05/03/18 1222) Vital Signs (24h Range):  Temp:  [98.7 °F (37.1 °C)-100.1 °F (37.8 °C)] 100.1 °F (37.8 °C)  Pulse:  [] 74  Resp:  [0-58] 15  SpO2:  [94 %-99 %] 96 %  BP: (117-192)/(55-93) 157/73     Weight: 62.3 kg (137 lb 5.6 oz)  Body mass index is 22.86 kg/m².    Physical Exam  Constitutional: Intubated  Head: Normocephalic.   Eyes: Right eye exhibits no discharge. Left eye exhibits no discharge.   Neck: Normal range of motion.   Cardiovascular: Normal rate and regular rhythm.    Pulmonary/Chest: Effort normal and breath sounds normal.   Abdominal: Soft. Bowel sounds are normal.   Musculoskeletal: He exhibits no edema.   Neurological: He is oriented to person, place, and time.   Skin: He is not diaphoretic.         NEUROLOGICAL EXAMINATION:      MENTAL STATUS   Intubated; unresponsive to verbal stimuli     CRANIAL NERVES      CN III, IV, VI   Right pupil: Size: 3 mm. Shape: regular. Sluggish reaction to light  Left pupil: Size: 3 mm. Shape: regular. Sluggish reaction to light.  Nystagmus: lateral nystagmus to left and right  Ocular bobbing    At times when eyes passively opened pt seems to look to the side of stimulus but this cannot be reproduced all the times pt is assessed.        Postive cough and gag reflex     MOTOR EXAM        Bilateral Babinski;   Upon noxious stimulation pt turns head towards stimuli;  Extensor response in UE's upon noxious stimulation       Significant Labs:   CBC:   Recent Labs  Lab 05/01/18  1758 05/03/18  1252   WBC 6.27 8.42   HGB 11.0* 12.0*   HCT 31.0* 34.7*    249     CMP:   Recent Labs  Lab 05/02/18  0145 05/03/18  1251   * 292*    146*   K 3.6 3.5   * 111*   CO2 21* 24   BUN 44* 54*   CREATININE 2.4* 2.8*   CALCIUM 8.6* 8.8   PROT  --  6.4   ALBUMIN 2.0* 2.2*   BILITOT  --  0.5    ALKPHOS  --  66   AST  --  60*   ALT  --  47*   ANIONGAP 9 11   EGFRNONAA 28* 23*       Assessment and Plan:     61 y/o male with acute stroke     1. Stroke: acute areas of infarction on posterior circulation. Hx of previous strokes as well. Had not been on statin or ASA for a long time.           -MRA head shows significant basilar stenosis among other findings likely due to atheromatous disease. This can make him prone to other strokes and decompensation if brainstem is involved more. Note that this pt has multiple co-morbidities including HF and his situation may change critically.For now high dose statin and dual antiplatelets.           -MRI shows some progression of bilateral brainstem infarctions. Pt with no significant neurological improvements when assessed after sedation has been held for more than 24 hours. He could be in a locked-in state given bilateral cerebral peduncle and ventral terrence involvement.    Possible scenarios discussed including no recovery from his acute medical problems. This would mean that pt may end up with PEG and tracheostomy. They understand but will continue to assess clinically daily and update them as they make decision accordingly.    Active Diagnoses:    Diagnosis Date Noted POA    PRINCIPAL PROBLEM:  Cerebrovascular accident (CVA) due to stenosis of cerebral artery [I63.50] 04/27/2018 Yes    Goals of care, counseling/discussion [Z71.89] 05/03/2018 Not Applicable    Cerebellar atrophy [G31.9] 05/01/2018 Yes    Diffuse cerebral atrophy [G31.9] 05/01/2018 Yes    Atherosclerotic cerebrovascular disease [I67.2] 05/01/2018 Yes    On mechanically assisted ventilation [Z99.11] 05/01/2018 Not Applicable    Basilar artery stenosis/occlusion [I65.1] 05/01/2018 Yes    Gross hematuria [R31.0] 05/01/2018 Yes    Aspiration into airway [T17.908A] 04/30/2018 No    Hyperglycemia due to type 2 diabetes mellitus [E11.65] 04/27/2018 Yes    Acute renal failure [N17.9] 04/27/2018 Yes     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 [I50.41] 04/27/2018 Yes    Aspiration pneumonia [J69.0] 12/17/2012 Yes    Hypertension [I10] 12/09/2012 Yes    Type 2 diabetes mellitus with hyperglycemia, with long-term current use of insulin [E11.65, Z79.4] 12/09/2012 Not Applicable    CVA, old, hemiparesis [I69.359] 12/09/2012 Not Applicable    Other dysphagia [R13.19] 12/08/2012 Yes      Problems Resolved During this Admission:    Diagnosis Date Noted Date Resolved POA       VTE Risk Mitigation         Ordered     enoxaparin injection 30 mg  Daily      05/02/18 1803     IP VTE LOW RISK PATIENT  Once      04/27/18 0248     Place sequential compression device  Until discontinued      04/27/18 0248          Lul Wylie MD  Neurology  [facility]

## 2018-05-03 NOTE — ASSESSMENT & PLAN NOTE
Intubated for aspiration PNA. Seen this morning on ventilator. Nystagmus on exam. Low Vt Ventilation.   Wean as tolerated.   Neuro function will dictate liberation from the ventilator.   Consider goals of care.   May need trach if family would like to continue care.

## 2018-05-03 NOTE — ASSESSMENT & PLAN NOTE
Agree with moxifloxacin as empiric treatment. On vent support. Tube feeds started. Good gas exchange on minimal vent support

## 2018-05-04 NOTE — ASSESSMENT & PLAN NOTE
MRI showing acute lacunar-type infarctions involving the superior right terrence and in the left cerebral peduncle as above.  MRA with extensive diffuse cerebrovascular disease with multiple areas of high grade stenosis.  Neurology discussed findings with stroke service at main campus. Recommended continuing optimized medical therapy  On clopidogrel, statin and ASA. Adjusting insulin for better BG control.    BP stable. Degree of LV systolic dysfunction is a limiting factor.  Vasopressor support as needed if hypotension.    Neurologically, he can only make eye contact briefly but no other meaningful recovery appreciated.  Discussed goals of care with family. Interested in Trach and PEG placement but to change code status to DNR. Wife and daughter understand what DNR is. I will consult GI and ENT for evaluation for PEG and trach placement.

## 2018-05-04 NOTE — SUBJECTIVE & OBJECTIVE
Interval History: opens eyes to voice.     Review of Systems   Unable to perform ROS: Intubated     Objective:     Vital Signs (Most Recent):  Temp: 98.5 °F (36.9 °C) (05/04/18 0959)  Pulse: 77 (05/04/18 0539)  Resp: 15 (05/04/18 0539)  BP: 128/65 (05/04/18 0532)  SpO2: 98 % (05/04/18 0742) Vital Signs (24h Range):  Temp:  [98.5 °F (36.9 °C)-100.1 °F (37.8 °C)] 98.5 °F (36.9 °C)  Pulse:  [70-92] 77  Resp:  [15-58] 15  SpO2:  [94 %-99 %] 98 %  BP: (116-175)/(59-83) 128/65     Weight: 62.3 kg (137 lb 5.6 oz)  Body mass index is 22.86 kg/m².    Intake/Output Summary (Last 24 hours) at 05/04/18 1003  Last data filed at 05/04/18 0500   Gross per 24 hour   Intake             1000 ml   Output              825 ml   Net              175 ml      Physical Exam   Constitutional: He appears well-developed. No distress.   HENT:   Head: Normocephalic and atraumatic.   ETT in place   Eyes: Pupils are equal, round, and reactive to light.   Cardiovascular: Normal rate and regular rhythm.    Pulmonary/Chest: Effort normal. He has no rales.   On MV   Abdominal: Soft. Bowel sounds are normal. He exhibits no distension.   Musculoskeletal: Normal range of motion. He exhibits no edema.   Neurological:    unresponsive to verbal or painful stimuli. Positive babinski sign. Decreased DTR. Downward gaze. Pupils reactive to light but sluggish. Some clonus to feet, but very minimal   Skin: He is not diaphoretic.   Nursing note and vitals reviewed.      Significant Labs: All pertinent labs within the past 24 hours have been reviewed.    Significant Imaging: I have reviewed all pertinent imaging results/findings within the past 24 hours.  I have reviewed and interpreted all pertinent imaging results/findings within the past 24 hours.

## 2018-05-04 NOTE — PROGRESS NOTES
Results for CARLOS ELIZALDE (MRN [id number]) as of 5/4/2018 05:43   Ref. Range 5/4/2018 04:36   POC PH Latest Ref Range: 7.35 - 7.45  7.479 (H)   POC PCO2 Latest Ref Range: 35 - 45 mmHg 35.2   POC PO2 Latest Ref Range: 80 - 100 mmHg 109 (H)   POC BE Latest Ref Range: -2 to 2 mmol/L 3   POC HCO3 Latest Ref Range: 24 - 28 mmol/L 26.2   POC SATURATED O2 Latest Ref Range: 95 - 100 % 99   POC TCO2 Latest Ref Range: 23 - 27 mmol/L 27   FiO2 Unknown 30   Vt Unknown 450   PiP Unknown 28   PEEP Unknown 5   Sample Unknown ARTERIAL   DelSys Unknown Adult Vent   Allens Test Unknown Pass   Site Unknown RR   Mode Unknown AC/PRVC   Rate Unknown 15

## 2018-05-04 NOTE — PROGRESS NOTES
Pt received on Servo I vent with settings as followed: PRVC 15/450/+5 and 30%.  Size 7.0 ETT in place and secure at 23 cm at the lip.  Ambu bag and mask at bedside and all alarms on and functioning. NARN. RT will continue to monitor pt status.

## 2018-05-04 NOTE — CONSULTS
Reason for Consult:  Feeding Difficulties, evaluate for PEG    HPI:  Pt is a 62 y.o. male who was admitted on 4/26/18and is currently being treated for a CVA.  Pt. With episode of feeding difficulty, with subsequent aspiration, and respiratory failure.  Dysphagia, acute, precipitated by CVA.  Severe, currently can only receive nutrition via NG tube.  No reports of associated abd. Pain, vomiting, yellowing of eyes/skin.  No diarrhea/constipation, blood in his stool nor black/tarry stools.      History obtained from chart, family member.    Pt. Had EGD/PEG in 2012, subsequently was removed due to improvement.      Past Medical History:   Diagnosis Date    Diabetes mellitus     Hypertension     Stroke        History reviewed. No pertinent surgical history.    History reviewed. No pertinent family history.    Social History     Social History    Marital status:      Spouse name: N/A    Number of children: N/A    Years of education: N/A     Occupational History    Not on file.     Social History Main Topics    Smoking status: Never Smoker    Smokeless tobacco: Not on file    Alcohol use No    Drug use: No    Sexual activity: Yes     Other Topics Concern    Not on file     Social History Narrative    No narrative on file       Endoscopic History:  EGD/PEG - 12/2012    Review of patient's allergies indicates:  No Known Allergies    No current facility-administered medications on file prior to encounter.      Current Outpatient Prescriptions on File Prior to Encounter   Medication Sig Dispense Refill    aspirin (ECOTRIN) 325 MG EC tablet Take 1 tablet (325 mg total) by mouth once daily. 30 tablet 1    insulin aspart protamine-insulin aspart (NOVOLOG 70/30) 100 unit/mL (70-30) InPn Inject 30 Units into the skin 2 (two) times daily before meals.        telmisartan (MICARDIS) 40 MG Tab Take 40 mg by mouth once daily.        guaifenesin-codeine 100-10 mg/5 ml (TUSSI-ORGANIDIN NR)  mg/5 mL syrup  Take 10 mLs by mouth every 6 (six) hours as needed for Cough or Congestion. 120 mL 0     Scheduled Meds:   aspirin  325 mg Per OG tube Daily    atorvastatin  80 mg Per OG tube Daily    chlorhexidine  15 mL Mouth/Throat BID    clopidogrel  75 mg Per OG tube Daily    enoxaparin  30 mg Subcutaneous Daily    famotidine (PF)  20 mg Intravenous Daily    insulin detemir U-100  20 Units Subcutaneous QHS    metoprolol tartrate  25 mg Per OG tube BID    moxifloxacin  400 mg Per NG tube Daily    polyethylene glycol  17 g Per G Tube Daily    senna-docusate 8.6-50 mg  1 tablet Per OG tube BID    sodium chloride 0.9%  500 mL Intravenous Once    sodium chloride 0.9%  3 mL Intravenous Q8H     Continuous Infusions:   sodium chloride 0.9%       PRN Meds:.albuterol-ipratropium 2.5mg-0.5mg/3mL, dextrose 50%, [] fentaNYL **FOLLOWED BY** fentaNYL, glucagon (human recombinant), insulin aspart U-100, labetalol, mineral oil, ondansetron, promethazine, sodium chloride 0.9%    Review of Systems:  Unable to obtain, secondary to current clinical condition    Patient Vitals for the past 24 hrs:   BP Temp Temp src Pulse Resp SpO2   18 1300 125/61 - - 76 (!) 45 96 %   18 1230 (!) 179/83 - - 87 (!) 25 97 %   18 1200 (!) 161/77 98.7 °F (37.1 °C) Oral 81 (!) 26 97 %   18 1130 (!) 141/65 - - 71 (!) 38 97 %   18 1100 (!) 175/80 - - 71 (!) 24 97 %   18 1030 (!) 169/78 - - 78 18 98 %   18 1000 (!) 180/83 - - 81 16 97 %   18 0959 - 98.5 °F (36.9 °C) - - - -   18 0930 (!) 160/74 - - 71 (!) 40 96 %   18 0909 (!) 180/83 - - 75 - -   18 0900 (!) 156/73 - - 71 (!) 54 96 %   18 0830 (!) 124/58 - - 69 (!) 60 96 %   18 0800 135/63 - - 73 (!) 58 96 %   18 0742 - - - - - 98 %   18 07 (!) 147/67 98.5 °F (36.9 °C) Oral 80 (!) 56 97 %   18 07 (!) 168/76 - - 84 (!) 23 97 %   18 06 (!) 162/75 - - 81 (!) 48 96 %   18 06 (!) 163/74 - -  82 (!) 58 96 %   05/04/18 0539 - - - 77 15 98 %   05/04/18 0532 128/65 - - - - -   05/04/18 0436 (!) 174/81 - - 75 15 97 %   05/04/18 0426 - - - 83 15 97 %   05/04/18 0402 136/72 - - - - -   05/04/18 0400 136/72 99.2 °F (37.3 °C) Oral 77 15 96 %   05/04/18 0310 - - - 80 15 97 %   05/04/18 0203 - - - 75 15 97 %   05/04/18 0202 (!) 162/77 - - - - -   05/04/18 0200 - - - 75 15 97 %   05/04/18 0132 (!) 161/81 - - - - -   05/04/18 0015 - - - 77 (!) 51 99 %   05/04/18 0013 - - - 79 (!) 55 98 %   05/04/18 0001 (!) 156/72 - - - - -   05/04/18 0000 (!) 156/72 99 °F (37.2 °C) Oral 74 (!) 32 99 %   05/03/18 2300 - - - 81 (!) 23 98 %   05/03/18 2240 - - - 73 (!) 30 98 %   05/03/18 2232 (!) 152/70 - - - - -   05/03/18 2200 - - - 74 (!) 26 97 %   05/03/18 2132 (!) 156/71 - - - - -   05/03/18 2031 - - - 82 19 98 %   05/03/18 2022 - - - 92 (!) 36 98 %   05/03/18 2002 (!) 144/69 - - - - -   05/03/18 2000 - - - 76 15 97 %   05/03/18 1932 124/65 - - - - -   05/03/18 1900 128/65 99 °F (37.2 °C) Oral 72 15 97 %   05/03/18 1800 - - - 75 15 96 %   05/03/18 1732 (!) 170/80 - - - - -   05/03/18 1620 - - - 79 (!) 58 97 %   05/03/18 1602 (!) 141/64 - - - - -   05/03/18 1530 (!) 145/66 99.1 °F (37.3 °C) Oral 76 (!) 41 97 %   05/03/18 1500 - - - 72 (!) 45 (!) 94 %   05/03/18 1432 (!) 122/59 - - - - -   05/03/18 1400 - - - 72 15 97 %   05/03/18 1332 (!) 116/59 - - - - -       Gen: Well developed, well nourished, intubated, on vent, sedated  HEENT: Anicteric, normocephalic, neck symmetrical, + ET, NG tubes  CV: S1, S2, no murmers/rubs, non-displaced PMI  Lungs: CTA-B, normal excursion  Abd: Soft, NT, ND, normal BS's, no HSM  Ext: No c/c/e, 1+ DP pulses to BLE's  Neuro: sedated, on vent  Skin: No rashes/lesions, normal texture  Psych: intubated, sedated    Labs:    Recent Labs  Lab 05/04/18  0252   CALCIUM 8.8      K 3.7   CO2 24   *   BUN 56*   CREATININE 2.7*     Recent Results (from the past 336 hour(s))   CBC auto differential     Collection Time: 05/03/18 12:52 PM   Result Value Ref Range    WBC 8.42 3.90 - 12.70 K/uL    Hemoglobin 12.0 (L) 14.0 - 18.0 g/dL    Hematocrit 34.7 (L) 40.0 - 54.0 %    Platelets 249 150 - 350 K/uL   CBC auto differential    Collection Time: 05/01/18  5:58 PM   Result Value Ref Range    WBC 6.27 3.90 - 12.70 K/uL    Hemoglobin 11.0 (L) 14.0 - 18.0 g/dL    Hematocrit 31.0 (L) 40.0 - 54.0 %    Platelets 178 150 - 350 K/uL   CBC auto differential    Collection Time: 04/30/18  8:16 AM   Result Value Ref Range    WBC 9.30 3.90 - 12.70 K/uL    Hemoglobin 12.6 (L) 14.0 - 18.0 g/dL    Hematocrit 35.8 (L) 40.0 - 54.0 %    Platelets 195 150 - 350 K/uL       Imaging:  Reviewed CXR    Assessment:  Pt. Is a 62 y.o. male with:  1. Dysphagia, Feeding difficulties - secondary to CVA.  Had episode of aspiration with subsequent respiratory failure.  Will likely need long term ventilator support.  2. CVA - with Resp. Failure.  3. DM, HTN....    Recommendations:  1. Continue with supportive care.  2. TF's as ordered.  3. Hold Plavix if OK with PCP/Neuro.  4. EGD/PEG once Plavix held x 5 days.      Discussed with family at bedside, who are in agreement.  All questions answered.        I would like to take this opportunity to thank you for this consult.  If you have any questions or concerns, please do not hesitate to contact me.

## 2018-05-04 NOTE — PROGRESS NOTES
[facility] Hospital Medicine  Progress Note    Patient Name: Jigar Michaels  MRN: [id number]  Patient Class: IP- Inpatient   Admission Date: 4/26/2018  Length of Stay: 8 days  Attending Physician: Sosa Holder MD  Primary Care Provider: Hannah Andino MD        Subjective:     Principal Problem:Cerebrovascular accident (CVA) due to stenosis of cerebral artery    HPI:  Jigar Michaels is a 62 y.o. male that (in part)  has a past medical history of Diabetes mellitus; Hypertension; and Stroke.  Presents to [facility] Emergency Department complaining of generalized weakness.  Subacute onset 1 week ago with progressive worsening.  Last known to be normal several days ago.  Was found to be significantly worse this morning by the patient's family member reports significant worsening of the left upper and lower extremity chronic weakness from a previous stroke.  He is unable to communicate effectively and is coughing, which may be consistent with aspiration.  Denies fever, chills, or recent infection.  No recent hospitalizations.  No report of syncope or collapse.  Significantly decreased oral intake over the last few days.  Patient was refusing to come to the Hospital prior to this.  Unfortunately due to the current condition of the patient remained of the history is limited    In the emergency department routine laboratory studies, CT of the head, and a consultation to tele-neurology were performed.  There is concern for acute CVA.  He is being admitted for stroke workup and neurology consultation.  MRI of the morning.    Hospital medicine has been asked to admit for further evaluation and treatment.    Hospital Course:  MRI confirms acute CVA with concern for possible embolic cause. ECHO showed new CHF, therefore, Cardiology consulted.  Neurology consulted. Continue permissive HTN and will likely need rehab for placement. Consulted social service for rehab placement.  Patient apparently  aspirated after being fed some meat by wife on 4/28.  Became more hypoxic and transferred to ICU. Respiratory status continued to worsen requiring endotracheal intubation. Weaned off sedation on 5/2 and remained off sedation while on MV. Only able to make eye contact intermittently, otherwise unable to move extremities when asked to and evidently with babinski sign. These findings are concerning for significant basilar artery involvement.     Interval History: off sedation > 48 hrs. Intermittently making eye contact when called by family in [place] but unable to move extremities when asked to    Review of Systems   Unable to perform ROS: Intubated     Objective:     Vital Signs (Most Recent):  Temp: 99.2 °F (37.3 °C) (05/04/18 0400)  Pulse: 77 (05/04/18 0539)  Resp: 15 (05/04/18 0539)  BP: 128/65 (05/04/18 0532)  SpO2: 98 % (05/04/18 0742) Vital Signs (24h Range):  Temp:  [99 °F (37.2 °C)-100.1 °F (37.8 °C)] 99.2 °F (37.3 °C)  Pulse:  [70-92] 77  Resp:  [0-58] 15  SpO2:  [94 %-99 %] 98 %  BP: (116-175)/(59-83) 128/65     Weight: 62.3 kg (137 lb 5.6 oz)  Body mass index is 22.86 kg/m².    Intake/Output Summary (Last 24 hours) at 05/04/18 0936  Last data filed at 05/04/18 0500   Gross per 24 hour   Intake             1030 ml   Output              825 ml   Net              205 ml      Physical Exam   Constitutional: He appears well-developed. No distress.   HENT:   Head: Normocephalic and atraumatic.   ETT in place   Eyes: Pupils are equal, round, and reactive to light.   Cardiovascular: Normal rate and regular rhythm.    Pulmonary/Chest: Effort normal. He has no rales.   On MV   Abdominal: Soft. Bowel sounds are normal. He exhibits no distension.   Musculoskeletal: Normal range of motion. He exhibits no edema.   Neurological:   unresponsive to verbal or painful stimuli but when called by his wife and daughter in [place]cu[place]orean he made eye contact briefly. Positive babinski sign. Decreased DTR to right patella. Downward  gaze. Pupils reactive to light but sluggish. No clonus. Does not move extremities when asked to   Skin: He is not diaphoretic.   Nursing note and vitals reviewed.      Significant Labs: All pertinent labs within the past 24 hours have been reviewed.    Significant Imaging: I have reviewed all pertinent imaging results/findings within the past 24 hours.  I have reviewed and interpreted all pertinent imaging results/findings within the past 24 hours.    Assessment/Plan:      * Cerebrovascular accident (CVA) due to stenosis of cerebral artery    MRI showing acute lacunar-type infarctions involving the superior right terrence and in the left cerebral peduncle as above.  MRA with extensive diffuse cerebrovascular disease with multiple areas of high grade stenosis.  Neurology discussed findings with stroke service at [facility]. Recommended continuing optimized medical therapy  On clopidogrel, statin and ASA. Adjusting insulin for better BG control.    BP stable. Degree of LV systolic dysfunction is a limiting factor.  Vasopressor support as needed if hypotension.    Neurologically, he can only make eye contact briefly but no other meaningful recovery appreciated.  Discussed goals of care with family. Interested in Trach and PEG placement but to change code status to DNR. Wife and daughter understand what DNR is. I will consult GI and ENT for evaluation for PEG and trach placement.              Basilar artery stenosis/occlusion    As seen on MRA. This is a concerning finding and appears to have progressed despite supportive measures. He does have significant stenosis. Neurologist, Pulmonologist and myself discussed with wife, two sons and daughter end of life measures vs trach/PEG. Family opting for trach and PEG placement as he is making eye contact but no other neuro improvement appreciated. Locked in?          On mechanically assisted ventilation    On day # 5 of vent. Famotidine for stress ulcer proph. Pulm on board for  co-management.           Aspiration pneumonia    Agree with moxifloxacin as empiric treatment. On vent support. Tube feeds started. Good gas exchange on minimal vent support          Goals of care, counseling/discussion    As above          Gross hematuria    May be from traumatic cuevas placement. Is on ASA and plavix for presenting stroke, which is needs at this time. Resolved. Restarted lovenox for DVT proph as he is at high risk          Atherosclerotic cerebrovascular disease    Seen on MRA. Likely 2/2 to poorly controlled DM2          Diffuse cerebral atrophy    Seen on MRI. Likely 2/2 chronic microvascular disease.           Cerebellar atrophy    Seen on MRI. Likely 2/2 chronic microvascular disease.           Aspiration into airway    Choked on food being fed to him by wife on 4/28. Declined respiratory wise and now intubated.  Resp Cx not impressive. BCx thus far negative. 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  Cardiology consulted. Planning on ischemic workup with LHC vs NST pending improvement of renal fx  On ASA, clopidogrel and statin. Will start a BB          Acute renal failure    Last Creat we have in record is from 2012.  May have underlying CKD secondary to HTN and DM   Is to receive a dose of IV lasix today for rales suggesting pulm edema.   Is also on ASA supp 300 mg which patient currently needs  Avoid any other nephrotoxin as much as possible as he would be a poor candidate for RRT if he were to need it  Cuevas in place. Strict I/Os. Renal panel in AM          Hyperglycemia due to type 2 diabetes mellitus    Adjust insulin therapy for goal BG < 180          CVA, old, hemiparesis    As above          Type 2 diabetes mellitus with hyperglycemia, with long-term current use of insulin    Uncontrolled with hyperglycemia.  Elevated HgA1c to 8.6% despite insulin therapy at home. Dietary non  compliance?  BG not at goal. Will adjust long acting insulin. Adjust as needed for goal BG < 180. On tube feeds           Hypertension    Difficult to allow permissive HTN given patient's LV systolic depression to 35% (per TTE), however he may no longer benefit from permissive HTN as he is > 72 hrs post new stroke. Is currently off antihypertensive therapy.           Other dysphagia    Apparent episode of aspiration 4/28. Has significant cerebrovascular disease and multiple strokes. Dysphagia unlikely to resolve. GI consulted for PEG          VTE Risk Mitigation         Ordered     enoxaparin injection 30 mg  Daily      05/02/18 1803     IP VTE LOW RISK PATIENT  Once      04/27/18 0248     Place sequential compression device  Until discontinued      04/27/18 0248          Critical care time spent on the evaluation and treatment of severe organ dysfunction, review of pertinent labs and imaging studies, discussions with consulting providers and discussions with patient/family: > 35 minutes.    Sosa Tellez MD  Department of Hospital Medicine   [facility]

## 2018-05-04 NOTE — PROGRESS NOTES
Family assistance--letters for son and granddaughter to obtain visa to visit from [facility] updated, scanned to daughter to assist with arrangements. Dr Wylie is assisting with the physician statement. SW will continue to assist as needed.

## 2018-05-04 NOTE — ASSESSMENT & PLAN NOTE
Intubated for aspiration PNA. Seen this morning on ventilator. Nystagmus on exam. Low Vt Ventilation.   Wean as tolerated.   Neuro function will dictate liberation from the ventilator.   Consider goals of care. Patient DNR.  Family would like to pursue trach/PEG

## 2018-05-04 NOTE — PLAN OF CARE
Problem: Diabetes, Type 2 (Adult)  Goal: Signs and Symptoms of Listed Potential Problems Will be Absent, Minimized or Managed (Diabetes, Type 2)  Signs and symptoms of listed potential problems will be absent, minimized or managed by discharge/transition of care (reference Diabetes, Type 2 (Adult) CPG).   Outcome: Ongoing (interventions implemented as appropriate)   05/03/18 1844   Diabetes, Type 2   Problems Assessed (Type 2 Diabetes) all   Problems Present (Type 2 Diabetes) hyperglycemia       Problem: Fall Risk (Adult)  Goal: Absence of Falls  Patient will demonstrate the desired outcomes by discharge/transition of care.   Outcome: Ongoing (interventions implemented as appropriate)   05/03/18 1844   Fall Risk (Adult)   Absence of Falls making progress toward outcome

## 2018-05-04 NOTE — PROGRESS NOTES
[facility]  Pulmonology  Progress Note    Patient Name: Jigar Michaels  MRN: [id number]  Admission Date: 4/26/2018  Hospital Length of Stay: 8 days  Code Status: DNR  Attending Provider: Sosa Holder MD  Primary Care Provider: Hannah Andino MD   Principal Problem: Cerebrovascular accident (CVA) due to stenosis of cerebral artery    Subjective:     Interval History: opens eyes to voice.     Review of Systems   Unable to perform ROS: Intubated     Objective:     Vital Signs (Most Recent):  Temp: 98.5 °F (36.9 °C) (05/04/18 0959)  Pulse: 77 (05/04/18 0539)  Resp: 15 (05/04/18 0539)  BP: 128/65 (05/04/18 0532)  SpO2: 98 % (05/04/18 0742) Vital Signs (24h Range):  Temp:  [98.5 °F (36.9 °C)-100.1 °F (37.8 °C)] 98.5 °F (36.9 °C)  Pulse:  [70-92] 77  Resp:  [15-58] 15  SpO2:  [94 %-99 %] 98 %  BP: (116-175)/(59-83) 128/65     Weight: 62.3 kg (137 lb 5.6 oz)  Body mass index is 22.86 kg/m².    Intake/Output Summary (Last 24 hours) at 05/04/18 1003  Last data filed at 05/04/18 0500   Gross per 24 hour   Intake             1000 ml   Output              825 ml   Net              175 ml      Physical Exam   Constitutional: He appears well-developed. No distress.   HENT:   Head: Normocephalic and atraumatic.   ETT in place   Eyes: Pupils are equal, round, and reactive to light.   Cardiovascular: Normal rate and regular rhythm.    Pulmonary/Chest: Effort normal. He has no rales.   On MV   Abdominal: Soft. Bowel sounds are normal. He exhibits no distension.   Musculoskeletal: Normal range of motion. He exhibits no edema.   Neurological:    unresponsive to verbal or painful stimuli. Positive babinski sign. Decreased DTR. Downward gaze. Pupils reactive to light but sluggish. Some clonus to feet, but very minimal   Skin: He is not diaphoretic.   Nursing note and vitals reviewed.      Significant Labs: All pertinent labs within the past 24 hours have been reviewed.    Significant Imaging: I have reviewed all pertinent  imaging results/findings within the past 24 hours.  I have reviewed and interpreted all pertinent imaging results/findings within the past 24 hours.    Assessment/Plan:     * Cerebrovascular accident (CVA) due to stenosis of cerebral artery    Per Neurology. No clinical improvement thus far.         On mechanically assisted ventilation    Intubated for aspiration PNA. Seen this morning on ventilator. Nystagmus on exam. Low Vt Ventilation.   Wean as tolerated.   Neuro function will dictate liberation from the ventilator.   Consider goals of care. Patient DNR.  Family would like to pursue trach/PEG          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    I/O's negative overnight. Would try to keep even. May need a dose of Lasix.        Acute renal failure    Monitor UOP.         Aspiration pneumonia    WBC normal. Can switch to Avelox. Complete 7 day course.         Type 2 diabetes mellitus with hyperglycemia, with long-term current use of insulin    BG goal < 180          Critical Care time: 35 minutes.     Critical Care time for the evaluation and treatment of severe organ dysfunction, review of pertinent labs and imaging studies, discussions with primary team and discussions with family.     Continue ICU care.        Dulce Alegre MD  Pulmonology  [facility]

## 2018-05-04 NOTE — ASSESSMENT & PLAN NOTE
Uncontrolled with hyperglycemia.  Elevated HgA1c to 8.6% despite insulin therapy at home. Dietary non compliance?  BG not at goal. Will adjust long acting insulin. Adjust as needed for goal BG < 180. On tube feeds

## 2018-05-04 NOTE — SUBJECTIVE & OBJECTIVE
Interval History: off sedation > 48 hrs. Intermittently making eye contact when called by family in [place] but unable to move extremities when asked to    Review of Systems   Unable to perform ROS: Intubated     Objective:     Vital Signs (Most Recent):  Temp: 99.2 °F (37.3 °C) (05/04/18 0400)  Pulse: 77 (05/04/18 0539)  Resp: 15 (05/04/18 0539)  BP: 128/65 (05/04/18 0532)  SpO2: 98 % (05/04/18 0742) Vital Signs (24h Range):  Temp:  [99 °F (37.2 °C)-100.1 °F (37.8 °C)] 99.2 °F (37.3 °C)  Pulse:  [70-92] 77  Resp:  [0-58] 15  SpO2:  [94 %-99 %] 98 %  BP: (116-175)/(59-83) 128/65     Weight: 62.3 kg (137 lb 5.6 oz)  Body mass index is 22.86 kg/m².    Intake/Output Summary (Last 24 hours) at 05/04/18 0936  Last data filed at 05/04/18 0500   Gross per 24 hour   Intake             1030 ml   Output              825 ml   Net              205 ml      Physical Exam   Constitutional: He appears well-developed. No distress.   HENT:   Head: Normocephalic and atraumatic.   ETT in place   Eyes: Pupils are equal, round, and reactive to light.   Cardiovascular: Normal rate and regular rhythm.    Pulmonary/Chest: Effort normal. He has no rales.   On MV   Abdominal: Soft. Bowel sounds are normal. He exhibits no distension.   Musculoskeletal: Normal range of motion. He exhibits no edema.   Neurological:   unresponsive to verbal or painful stimuli but when called by his wife and daughter in [place]ian he made eye contact briefly. Positive babinski sign. Decreased DTR to right patella. Downward gaze. Pupils reactive to light but sluggish. No clonus. Does not move extremities when asked to   Skin: He is not diaphoretic.   Nursing note and vitals reviewed.      Significant Labs: All pertinent labs within the past 24 hours have been reviewed.    Significant Imaging: I have reviewed all pertinent imaging results/findings within the past 24 hours.  I have reviewed and interpreted all pertinent imaging results/findings within the past 24  hours.

## 2018-05-04 NOTE — ASSESSMENT & PLAN NOTE
Apparent episode of aspiration 4/28. Has significant cerebrovascular disease and multiple strokes. Dysphagia unlikely to resolve. GI consulted for PEG

## 2018-05-04 NOTE — ASSESSMENT & PLAN NOTE
As seen on MRA. This is a concerning finding and appears to have progressed despite supportive measures. He does have significant stenosis. Neurologist, Pulmonologist and myself discussed with wife, two sons and daughter end of life measures vs trach/PEG. Family opting for trach and PEG placement as he is making eye contact but no other neuro improvement appreciated. Locked in?

## 2018-05-04 NOTE — PROGRESS NOTES
[facility]  Neurology  Progress Note    Patient Name: Jigar Michaels  MRN: [id number]  Admission Date: 4/26/2018  Hospital Length of Stay: 8 days  Code Status: DNR   Attending Provider: Sosa Holder MD  Primary Care Physician: Hannah Andino MD   Principal Problem:Cerebrovascular accident (CVA) due to stenosis of cerebral artery    Subjective:     Interval History: 63 y/o male with medical Hx as listed below comes to ED for weakness on left side. Pt has Hx of stroke with residual left sided weakness but family reports worsening of motor deficits. His wife states that he is able to walk and feed himself at baseline but his speech is slurred. He is adherent to medications according to wife. Pt has already had at least two strokes.     -4/28/18: Pt with severe coughing when eating.     -4/29/18: Transferred to ICU after episode aspiration while eating.     -4/30/18: Pt is more dysarthric.      -5/1/18: Intubated overnight. Off sedation pt is not responsive.     -5/2/18: Pt moving feet spontaneously but following commands.      -5/3/18: Remains off sedation. Has not been following commands.    Current Neurological Medications:     Current Facility-Administered Medications   Medication Dose Route Frequency Provider Last Rate Last Dose    albuterol-ipratropium 2.5mg-0.5mg/3mL nebulizer solution 3 mL  3 mL Nebulization Q4H PRN Alexander Florence MD   3 mL at 04/30/18 1759    aspirin tablet 325 mg  325 mg Per OG tube Daily Sosa Holder MD   325 mg at 05/04/18 0959    atorvastatin tablet 80 mg  80 mg Per OG tube Daily Cristóbal Batista MD   80 mg at 05/04/18 1000    chlorhexidine 0.12 % solution 15 mL  15 mL Mouth/Throat BID Cristóbal Batista MD   15 mL at 05/04/18 0951    clopidogrel tablet 75 mg  75 mg Per OG tube Daily Critsóbal Batista MD   75 mg at 05/04/18 0959    dextrose 50% injection 12.5 g  12.5 g Intravenous PRN Silvano Velasquez MD        enoxaparin injection 30 mg  30 mg Subcutaneous  Daily Sosa Holder MD   30 mg at 05/03/18 1715    famotidine (PF) injection 20 mg  20 mg Intravenous Daily Cristóbal Batista MD   20 mg at 05/04/18 0959    fentaNYL injection 50 mcg  50 mcg Intravenous Q1H PRN Silvano Velasquez MD   50 mcg at 05/03/18 0234    glucagon (human recombinant) injection 1 mg  1 mg Intramuscular PRN Silvano Velasquez MD        insulin aspart U-100 pen 1-10 Units  1-10 Units Subcutaneous Q6H PRN Silvano Velasquez MD   10 Units at 05/04/18 0539    insulin detemir U-100 pen 20 Units  20 Units Subcutaneous QHS Sosa Holder MD        labetalol injection 10 mg  10 mg Intravenous Q15 Min PRN Silvano Velasquez MD   10 mg at 05/03/18 0236    metoprolol tartrate (LOPRESSOR) tablet 25 mg  25 mg Per OG tube BID Sosa Holder MD   25 mg at 05/04/18 0909    mineral oil enema 1 enema  1 enema Rectal Daily PRN Silvano Velasquez MD        moxifloxacin tablet 400 mg  400 mg Per NG tube Daily Dulce Alegre MD   400 mg at 05/04/18 0955    ondansetron disintegrating tablet 8 mg  8 mg Oral Q8H PRN Silvano Velasquez MD   8 mg at 04/28/18 0959    polyethylene glycol packet 17 g  17 g Per G Tube Daily Sosa Holder MD   17 g at 05/04/18 0955    promethazine suppository 25 mg  25 mg Rectal Q6H PRN Silvano Velasqeuz MD        senna-docusate 8.6-50 mg per tablet 1 tablet  1 tablet Per OG tube BID Sosa Holder MD   1 tablet at 05/04/18 0955    sodium chloride 0.9% bolus 500 mL  500 mL Intravenous Continuous PRN Silvano Velasquez MD        sodium chloride 0.9% bolus 500 mL  500 mL Intravenous Once Cristóbal Batista MD        sodium chloride 0.9% flush 3 mL  3 mL Intravenous Q8H Silvano Velasquez MD   10 mL at 05/04/18 0538       Review of Systems   Unable to obtain: pt intubated, unresponsive    Objective:     Vital Signs (Most Recent):  Temp: 98.7 °F (37.1 °C) (05/04/18 1200)  Pulse: 81 (05/04/18 1200)  Resp: (!) 26 (05/04/18 1200)  BP: (!) 161/77  (05/04/18 1200)  SpO2: 97 % (05/04/18 1200) Vital Signs (24h Range):  Temp:  [98.5 °F (36.9 °C)-99.2 °F (37.3 °C)] 98.7 °F (37.1 °C)  Pulse:  [69-92] 81  Resp:  [15-60] 26  SpO2:  [94 %-99 %] 97 %  BP: (116-180)/(58-83) 161/77     Weight: 62.3 kg (137 lb 5.6 oz)  Body mass index is 22.86 kg/m².    Physical Exam  Constitutional: Intubated  Head: Normocephalic.   Eyes: Right eye exhibits no discharge. Left eye exhibits no discharge.   Neck: Normal range of motion.   Cardiovascular: Normal rate and regular rhythm.    Pulmonary/Chest: Effort normal and breath sounds normal.   Abdominal: Soft. Bowel sounds are normal.   Musculoskeletal: He exhibits no edema.   Neurological: He is oriented to person, place, and time.   Skin: He is not diaphoretic.         NEUROLOGICAL EXAMINATION:      MENTAL STATUS   Intubated; unresponsive to verbal stimuli     CRANIAL NERVES      CN III, IV, VI   Right pupil: Size: 3 mm. Shape: regular. Sluggish reaction to light  Left pupil: Size: 3 mm. Shape: regular. Sluggish reaction to light.  Nystagmus: lateral nystagmus to left and right  Ocular bobbing     Partially opening eyes on verbal stimuli.        Postive cough and gag reflex     MOTOR EXAM        Bilateral Babinski;   Upon noxious stimulation pt turns head towards stimuli;  Extensor response in UE's upon noxious stimulation       Significant Labs:   CBC:   Recent Labs  Lab 05/03/18  1252   WBC 8.42   HGB 12.0*   HCT 34.7*        CMP:   Recent Labs  Lab 05/03/18  1251 05/04/18  0252   * 345*   * 145   K 3.5 3.7   * 111*   CO2 24 24   BUN 54* 56*   CREATININE 2.8* 2.7*   CALCIUM 8.8 8.8   PROT 6.4  --    ALBUMIN 2.2* 2.1*   BILITOT 0.5  --    ALKPHOS 66  --    AST 60*  --    ALT 47*  --    ANIONGAP 11 10   EGFRNONAA 23* 24*       Assessment and Plan:     63 y/o male with acute stroke     1. Stroke: acute areas of infarction on posterior circulation. Hx of previous strokes as well. Had not been on statin or ASA  for a long time. No improvement to date.           -MRA head shows significant basilar stenosis among other findings likely due to atheromatous disease. This can make him prone to other strokes and decompensation if brainstem is involved more. Note that this pt has multiple co-morbidities including HF and his situation may change critically. For now high dose statin and dual antiplatelets.           -MRI shows some progression of bilateral brainstem infarctions. Pt with no significant neurological improvements when assessed after sedation has been held for more than 24 hours. He could be in a locked-in state given bilateral cerebral peduncle and ventral terrence involvement.     Possible scenarios discussed including no recovery from his acute medical problems. This would mean that pt may end up with PEG and tracheostomy. They understand but will continue to assess clinically daily and update them as they make decision accordingly.       Active Diagnoses:    Diagnosis Date Noted POA    PRINCIPAL PROBLEM:  Cerebrovascular accident (CVA) due to stenosis of cerebral artery [I63.50] 04/27/2018 Yes    Goals of care, counseling/discussion [Z71.89] 05/03/2018 Not Applicable    Cerebellar atrophy [G31.9] 05/01/2018 Yes    Diffuse cerebral atrophy [G31.9] 05/01/2018 Yes    Atherosclerotic cerebrovascular disease [I67.2] 05/01/2018 Yes    On mechanically assisted ventilation [Z99.11] 05/01/2018 Not Applicable    Basilar artery stenosis/occlusion [I65.1] 05/01/2018 Yes    Gross hematuria [R31.0] 05/01/2018 Yes    Aspiration into airway [T17.908A] 04/30/2018 No    Hyperglycemia due to type 2 diabetes mellitus [E11.65] 04/27/2018 Yes    Acute renal failure [N17.9] 04/27/2018 Yes    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 [I50.41] 04/27/2018 Yes    Aspiration pneumonia [J69.0] 12/17/2012 Yes    Hypertension [I10] 12/09/2012 Yes    Type 2 diabetes mellitus with hyperglycemia, with long-term current  use of insulin [E11.65, Z79.4] 12/09/2012 Not Applicable    CVA, old, hemiparesis [I69.359] 12/09/2012 Not Applicable    Other dysphagia [R13.19] 12/08/2012 Yes      Problems Resolved During this Admission:    Diagnosis Date Noted Date Resolved POA       VTE Risk Mitigation         Ordered     enoxaparin injection 30 mg  Daily      05/02/18 1803     IP VTE LOW RISK PATIENT  Once      04/27/18 0248     Place sequential compression device  Until discontinued      04/27/18 0248          Lul Wylie MD  Neurology  [facility]

## 2018-05-05 NOTE — SUBJECTIVE & OBJECTIVE
Interval History: not making eye contact this afternoon. Sodium is 148 today. Afebrile. BP very labile    Review of Systems   Unable to perform ROS: Intubated     Objective:     Vital Signs (Most Recent):  Temp: 98.6 °F (37 °C) (05/05/18 1130)  Pulse: 70 (05/05/18 1151)  Resp: (!) 47 (05/05/18 1151)  BP: (!) 170/79 (05/05/18 1132)  SpO2: 97 % (05/05/18 1151) Vital Signs (24h Range):  Temp:  [98.6 °F (37 °C)-99.8 °F (37.7 °C)] 98.6 °F (37 °C)  Pulse:  [61-86] 70  Resp:  [15-76] 47  SpO2:  [95 %-100 %] 97 %  BP: (119-186)/(58-86) 170/79     Weight: 62.3 kg (137 lb 5.6 oz)  Body mass index is 22.86 kg/m².    Intake/Output Summary (Last 24 hours) at 05/05/18 1445  Last data filed at 05/05/18 1100   Gross per 24 hour   Intake             1040 ml   Output              815 ml   Net              225 ml      Physical Exam   Constitutional: He appears well-developed. No distress.   HENT:   Head: Normocephalic and atraumatic.   ETT in place   Eyes: Pupils are equal, round, and reactive to light.   Cardiovascular: Normal rate and regular rhythm.    Pulmonary/Chest: Effort normal. He has no rales.   On MV   Abdominal: Soft. Bowel sounds are normal. He exhibits no distension.   Musculoskeletal: Normal range of motion. He exhibits no edema.   Neurological:   No eye contact today when called by daughter in Croatian. Positive babinski sign. Decreased DTR to right patella. Downward gaze. Pupils reactive to light but sluggish. No clonus. Does not move extremities when asked to   Skin: He is not diaphoretic.   Nursing note and vitals reviewed.      Significant Labs: All pertinent labs within the past 24 hours have been reviewed.    Significant Imaging: I have reviewed all pertinent imaging results/findings within the past 24 hours.  I have reviewed and interpreted all pertinent imaging results/findings within the past 24 hours.

## 2018-05-05 NOTE — ASSESSMENT & PLAN NOTE
Concern that patient will not have improvement from CVA.   Family would like PEG/trach.   Monitor renal function.   DNR.

## 2018-05-05 NOTE — PROGRESS NOTES
[facility] Hospital Medicine  Progress Note    Patient Name: Jigar Michaels  MRN: [id number]  Patient Class: IP- Inpatient   Admission Date: 4/26/2018  Length of Stay: 9 days  Attending Physician: Sosa Holder MD  Primary Care Provider: Hannah Andino MD        Subjective:     Principal Problem:Cerebrovascular accident (CVA) due to stenosis of cerebral artery    HPI:  Jigar Michaels is a 62 y.o. male that (in part)  has a past medical history of Diabetes mellitus; Hypertension; and Stroke.  Presents to [facility] Emergency Department complaining of generalized weakness.  Subacute onset 1 week ago with progressive worsening.  Last known to be normal several days ago.  Was found to be significantly worse this morning by the patient's family member reports significant worsening of the left upper and lower extremity chronic weakness from a previous stroke.  He is unable to communicate effectively and is coughing, which may be consistent with aspiration.  Denies fever, chills, or recent infection.  No recent hospitalizations.  No report of syncope or collapse.  Significantly decreased oral intake over the last few days.  Patient was refusing to come to the Hospital prior to this.  Unfortunately due to the current condition of the patient remained of the history is limited    In the emergency department routine laboratory studies, CT of the head, and a consultation to tele-neurology were performed.  There is concern for acute CVA.  He is being admitted for stroke workup and neurology consultation.  MRI of the morning.    Hospital medicine has been asked to admit for further evaluation and treatment.    Hospital Course:  MRI confirms acute CVA with concern for possible embolic cause. ECHO showed new CHF, therefore, Cardiology consulted.  Neurology consulted. Continue permissive HTN and will likely need rehab for placement. Consulted social service for rehab placement.  Patient apparently  aspirated after being fed some meat by wife on 4/28.  Became more hypoxic and transferred to ICU. Respiratory status continued to worsen requiring endotracheal intubation. Weaned off sedation on 5/2 and remained off sedation while on MV. Only able to make eye contact intermittently, otherwise unable to move extremities when asked to and evidently with babinski sign. These findings are concerning for significant basilar artery involvement.     Interval History: not making eye contact this afternoon. Sodium is 148 today. Afebrile. BP very labile    Review of Systems   Unable to perform ROS: Intubated     Objective:     Vital Signs (Most Recent):  Temp: 98.6 °F (37 °C) (05/05/18 1130)  Pulse: 70 (05/05/18 1151)  Resp: (!) 47 (05/05/18 1151)  BP: (!) 170/79 (05/05/18 1132)  SpO2: 97 % (05/05/18 1151) Vital Signs (24h Range):  Temp:  [98.6 °F (37 °C)-99.8 °F (37.7 °C)] 98.6 °F (37 °C)  Pulse:  [61-86] 70  Resp:  [15-76] 47  SpO2:  [95 %-100 %] 97 %  BP: (119-186)/(58-86) 170/79     Weight: 62.3 kg (137 lb 5.6 oz)  Body mass index is 22.86 kg/m².    Intake/Output Summary (Last 24 hours) at 05/05/18 1445  Last data filed at 05/05/18 1100   Gross per 24 hour   Intake             1040 ml   Output              815 ml   Net              225 ml      Physical Exam   Constitutional: He appears well-developed. No distress.   HENT:   Head: Normocephalic and atraumatic.   ETT in place   Eyes: Pupils are equal, round, and reactive to light.   Cardiovascular: Normal rate and regular rhythm.    Pulmonary/Chest: Effort normal. He has no rales.   On MV   Abdominal: Soft. Bowel sounds are normal. He exhibits no distension.   Musculoskeletal: Normal range of motion. He exhibits no edema.   Neurological:   No eye contact today when called by daughter in Fijian. Positive babinski sign. Decreased DTR to right patella. Downward gaze. Pupils reactive to light but sluggish. No clonus. Does not move extremities when asked to   Skin: He is not  diaphoretic.   Nursing note and vitals reviewed.      Significant Labs: All pertinent labs within the past 24 hours have been reviewed.    Significant Imaging: I have reviewed all pertinent imaging results/findings within the past 24 hours.  I have reviewed and interpreted all pertinent imaging results/findings within the past 24 hours.    Assessment/Plan:      * Cerebrovascular accident (CVA) due to stenosis of cerebral artery    MRI showing acute lacunar-type infarctions involving the superior right terrence and in the left cerebral peduncle as above.  MRA with extensive diffuse cerebrovascular disease with multiple areas of high grade stenosis.  Neurology discussed findings with stroke service at [facility]. Recommended continuing optimized medical therapy  On statin and ASA. Adjusting insulin for better BG control.    BP stable. Degree of LV systolic dysfunction is a limiting factor.  Vasopressor support as needed if hypotension.    Neurologically, he can only make eye contact briefly but no other meaningful recovery appreciated.  Discussed goals of care with family. Interested in Trach and PEG placement but to change code status to DNR. Wife and daughter understand what DNR is. I will consult GI and ENT for evaluation for PEG and trach placement. Clopidogrel on hold since 5/5 as he has to be off of this at least 5 days prior to surgery.             Basilar artery stenosis/occlusion    As seen on MRA. This is a concerning finding and appears to have progressed despite supportive measures. He does have significant stenosis. Neurologist, Pulmonologist and myself discussed with wife, two sons and daughter end of life measures vs trach/PEG. Family opting for trach and PEG placement as he is making eye contact but no other neuro improvement appreciated. Locked in?          On mechanically assisted ventilation    On day # 6 of vent. Famotidine for stress ulcer proph. Pulm on board for co-management.           Aspiration  pneumonia    Agree with moxifloxacin as empiric treatment. On vent support. Tube feeds started. Good gas exchange on minimal vent support          Goals of care, counseling/discussion    As above          Gross hematuria    May be from traumatic cuevas placement. Is on ASA and plavix for presenting stroke, which is needs at this time. Resolved. Restarted lovenox for DVT proph as he is at high risk          Atherosclerotic cerebrovascular disease    Seen on MRA. Likely 2/2 to poorly controlled DM2          Diffuse cerebral atrophy    Seen on MRI. Likely 2/2 chronic microvascular disease.           Cerebellar atrophy    Seen on MRI. Likely 2/2 chronic microvascular disease.           Aspiration into airway    Choked on food being fed to him by wife on 4/28. Declined respiratory wise and now intubated.  Resp Cx not impressive. BCx thus far negative. 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  Cardiology consulted. Planning on ischemic workup with LHC vs NST pending improvement of renal fx  On ASA, clopidogrel and statin. Will start a BB          Acute renal failure    Last Creat we have in record is from 2012.  May have underlying CKD secondary to HTN and DM   Holding diuresis as it is not indicated at this time  Is also on ASA supp 300 mg which patient currently needs  Avoid any other nephrotoxin as much as possible as he would be a poor candidate for RRT if he were to need it  Add free water enteric boluses  Cuevas in place. Strict I/Os. Renal panel in AM          Hyperglycemia due to type 2 diabetes mellitus    Adjust insulin therapy for goal BG < 180          CVA, old, hemiparesis    As above          Type 2 diabetes mellitus with hyperglycemia, with long-term current use of insulin    Uncontrolled with hyperglycemia.  Elevated HgA1c to 8.6% despite insulin therapy at home. Dietary non compliance?  BG not at  goal. Will adjust long acting insulin. Adjust as needed for goal BG < 180. On tube feeds           Hypertension    Difficult to allow permissive HTN given patient's LV systolic depression to 35% (per TTE), however he may no longer benefit from permissive HTN as he is > 72 hrs post new stroke. Is currently off antihypertensive therapy.           Other dysphagia    Apparent episode of aspiration 4/28. Has significant cerebrovascular disease and multiple strokes. Dysphagia unlikely to resolve. GI consulted for PEG          VTE Risk Mitigation         Ordered     enoxaparin injection 30 mg  Daily      05/02/18 1803     IP VTE LOW RISK PATIENT  Once      04/27/18 0248     Place sequential compression device  Until discontinued      04/27/18 0248        Discussed with daughter at bedside    Critical care time spent on the evaluation and treatment of severe organ dysfunction, review of pertinent labs and imaging studies, discussions with consulting providers and discussions with patient/family: > 35 minutes.    Sosa Tellez MD  Department of Hospital Medicine   [facility]

## 2018-05-05 NOTE — ASSESSMENT & PLAN NOTE
MRI showing acute lacunar-type infarctions involving the superior right terrence and in the left cerebral peduncle as above.  MRA with extensive diffuse cerebrovascular disease with multiple areas of high grade stenosis.  Neurology discussed findings with stroke service at [facility]. Recommended continuing optimized medical therapy  On statin and ASA. Adjusting insulin for better BG control.    BP stable. Degree of LV systolic dysfunction is a limiting factor.  Vasopressor support as needed if hypotension.    Neurologically, he can only make eye contact briefly but no other meaningful recovery appreciated.  Discussed goals of care with family. Interested in Trach and PEG placement but to change code status to DNR. Wife and daughter understand what DNR is. I will consult GI and ENT for evaluation for PEG and trach placement. Clopidogrel on hold since 5/5 as he has to be off of this at least 5 days prior to surgery.

## 2018-05-05 NOTE — PLAN OF CARE
Problem: Patient Care Overview  Goal: Plan of Care Review  Outcome: Ongoing (interventions implemented as appropriate)  Patient remains intubated on vent with no sedation. Patient opens eyes but does not track with eyes. Patient responds with jerky stiff movements to noxious stimuli but does not follow commands. ENT and ENDO consulted today for a trach and peg. Family wants son to come from [facility] before they withdraw care. Patient tolerating tube feeding with little or no residual. Urine output adequate. Vital sign stable and no temp today. Patient has a lot of oral secretions suctioned from back of throat. No injuries, no falls or skin breakdown. Plan of care reviewed with family at bedside.

## 2018-05-05 NOTE — PROGRESS NOTES
Pt. Remains on vent. At this time. No changes were made.  Vent. Is set at, prvc, rate 14, vt. 450, +5 peep, and 40% oxygen.  PHANN

## 2018-05-05 NOTE — PROGRESS NOTES
[facility]  Pulmonology  Progress Note    Patient Name: Jigar Michaels  MRN: [id number]  Admission Date: 4/26/2018  Hospital Length of Stay: 9 days  Code Status: DNR  Attending Provider: Sosa Holder MD  Primary Care Provider: Hannah Andino MD   Principal Problem: Cerebrovascular accident (CVA) due to stenosis of cerebral artery    Subjective:     Interval History: Family would like Trach and PEG. He is DNR at this point.     Review of Systems   Unable to perform ROS: Intubated     Objective:     Vital Signs (Most Recent):  Temp: 99.5 °F (37.5 °C) (05/05/18 0730)  Pulse: 68 (05/05/18 0734)  Resp: (!) 26 (05/05/18 0734)  BP: (!) 156/71 (05/05/18 0500)  SpO2: 98 % (05/05/18 0734) Vital Signs (24h Range):  Temp:  [98.5 °F (36.9 °C)-99.8 °F (37.7 °C)] 99.5 °F (37.5 °C)  Pulse:  [68-87] 68  Resp:  [15-76] 26  SpO2:  [95 %-100 %] 98 %  BP: (121-180)/(59-86) 156/71     Weight: 62.3 kg (137 lb 5.6 oz)  Body mass index is 22.86 kg/m².    Intake/Output Summary (Last 24 hours) at 05/05/18 0834  Last data filed at 05/05/18 0700   Gross per 24 hour   Intake             1050 ml   Output             1030 ml   Net               20 ml      Physical Exam   Constitutional: He appears well-developed. No distress.   HENT:   Head: Normocephalic and atraumatic.   ETT in place   Eyes: Pupils are equal, round, and reactive to light.   Cardiovascular: Normal rate and regular rhythm.    Pulmonary/Chest: Effort normal. He has no rales.   On MV   Abdominal: Soft. Bowel sounds are normal. He exhibits no distension.   Musculoskeletal: Normal range of motion. He exhibits no edema.   Neurological:    unresponsive to verbal or painful stimuli. Positive babinski sign. Decreased DTR. Downward gaze. Pupils reactive to light but sluggish. Some clonus to feet, but very minimal   Skin: He is not diaphoretic.   Nursing note and vitals reviewed.      Significant Labs: All pertinent labs within the past 24 hours have been  reviewed.    Significant Imaging: I have reviewed all pertinent imaging results/findings within the past 24 hours.  I have reviewed and interpreted all pertinent imaging results/findings within the past 24 hours.    Assessment/Plan:     * Cerebrovascular accident (CVA) due to stenosis of cerebral artery     No clinical improvement thus far. Neurology following. Secondary stroke prevention.        Goals of care, counseling/discussion    Concern that patient will not have improvement from CVA.   Family would like PEG/trach.   Monitor renal function.   DNR.        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 Would try to keep even.         Acute renal failure    Monitor UOP. BUN/Cr continue to rise.         Aspiration pneumonia    WBC normal. Can switch to Avelox. Complete 7 day course. 1 more day.          Please call with questions. Will follow from [facility].        Dulce Alegre MD  Pulmonology  [facility]

## 2018-05-05 NOTE — ASSESSMENT & PLAN NOTE
Last Creat we have in record is from 2012.  May have underlying CKD secondary to HTN and DM   Holding diuresis as it is not indicated at this time  Is also on ASA supp 300 mg which patient currently needs  Avoid any other nephrotoxin as much as possible as he would be a poor candidate for RRT if he were to need it  Add free water enteric boluses  Mcghee in place. Strict I/Os. Renal panel in AM

## 2018-05-05 NOTE — PROGRESS NOTES
Received patient orally intubated and on a Servo I Ventilator with settings as follows:  PRVC 15/ 450/ +5 PEEP/ 30%.  Patient has a size 7.0 ET tube in place which is secured at the 23 cm corina at the lips in the center of the mouth.  The ET tube was rotated to the right side of the mouth.  The patient has no redness or breakdown noted to the skin at present time.  The Ventilator alarms are set and functional and the AMBU bag is at the HOB.

## 2018-05-05 NOTE — PLAN OF CARE
05/04/18 1503   Discharge Reassessment   Assessment Type Discharge Planning Reassessment   Provided patient/caregiver education on the expected discharge date and the discharge plan Yes   Do you have any problems affording any of your prescribed medications? No   Discharge Plan A Long-term acute care facility (LTAC)   Patient choice form signed by patient/caregiver No   Can the patient answer the patient profile reliably? No, cognitively impaired   How does the patient rate their overall health at the present time? Poor   Describe the patient's ability to walk at the present time. Does not walk or unable to take any steps at all   How often would a person be available to care for the patient? Often   Number of comorbid conditions (as recorded on the chart) Five or more   During the past month, has the patient often been bothered by feeling down, depressed or hopeless? No   During the past month, has the patient often been bothered by little interest or pleasure in doing things? No   patient remains in ICU on vent support. RO reviewed chart, reviewed with Dr Holder in bed huddle, spoke with daughter in ICU thru day. SW provided daughter information regarding LTAC after doctors met with family regarding trach/PEG plan. SW will continue to follow in ICU and assist as needed.

## 2018-05-05 NOTE — PLAN OF CARE
Problem: Patient Care Overview  Goal: Plan of Care Review  Outcome: Ongoing (interventions implemented as appropriate)  Remains on vent.  Withdraws from noxious stimulation; extensor posturing of BUE.  Eyes open when coughing, but not to command.  Pupils equal, sluggish. Does not follow any commands.  Cardiac monitor shows NSR.  Mcghee with adequate urine output.  Blood sugars remain high.  Tolerating tube feeds at goal rate.  Receiving free water flushes q 6hrs, increased from 100ml to 200mls this pm.  Remains free from falls or other hospital acquired injury this shift.

## 2018-05-05 NOTE — PLAN OF CARE
Problem: Patient Care Overview  Goal: Plan of Care Review  Outcome: Ongoing (interventions implemented as appropriate)  Pt remains in ICU on vent. No continuous sedation. Pt responds with jerky movements to noxious stimuli but does not follow commands. PRN Fentanyl given once.

## 2018-05-05 NOTE — SUBJECTIVE & OBJECTIVE
Interval History: Family would like Trach and PEG. He is DNR at this point.     Review of Systems   Unable to perform ROS: Intubated     Objective:     Vital Signs (Most Recent):  Temp: 99.5 °F (37.5 °C) (05/05/18 0730)  Pulse: 68 (05/05/18 0734)  Resp: (!) 26 (05/05/18 0734)  BP: (!) 156/71 (05/05/18 0500)  SpO2: 98 % (05/05/18 0734) Vital Signs (24h Range):  Temp:  [98.5 °F (36.9 °C)-99.8 °F (37.7 °C)] 99.5 °F (37.5 °C)  Pulse:  [68-87] 68  Resp:  [15-76] 26  SpO2:  [95 %-100 %] 98 %  BP: (121-180)/(59-86) 156/71     Weight: 62.3 kg (137 lb 5.6 oz)  Body mass index is 22.86 kg/m².    Intake/Output Summary (Last 24 hours) at 05/05/18 0834  Last data filed at 05/05/18 0700   Gross per 24 hour   Intake             1050 ml   Output             1030 ml   Net               20 ml      Physical Exam   Constitutional: He appears well-developed. No distress.   HENT:   Head: Normocephalic and atraumatic.   ETT in place   Eyes: Pupils are equal, round, and reactive to light.   Cardiovascular: Normal rate and regular rhythm.    Pulmonary/Chest: Effort normal. He has no rales.   On MV   Abdominal: Soft. Bowel sounds are normal. He exhibits no distension.   Musculoskeletal: Normal range of motion. He exhibits no edema.   Neurological:    unresponsive to verbal or painful stimuli. Positive babinski sign. Decreased DTR. Downward gaze. Pupils reactive to light but sluggish. Some clonus to feet, but very minimal   Skin: He is not diaphoretic.   Nursing note and vitals reviewed.      Significant Labs: All pertinent labs within the past 24 hours have been reviewed.    Significant Imaging: I have reviewed all pertinent imaging results/findings within the past 24 hours.  I have reviewed and interpreted all pertinent imaging results/findings within the past 24 hours.

## 2018-05-06 NOTE — ASSESSMENT & PLAN NOTE
Echo showing EF of 35% with diastolic dysfunction.  Cardiology consulted. Planning on ischemic workup with LHC vs NST pending improvement of renal fx  On ASA, BB and statin. Plavix on hold

## 2018-05-06 NOTE — PROGRESS NOTES
[facility] Hospital Medicine  Progress Note    Patient Name: Jigar Michaels  MRN: [id number]  Patient Class: IP- Inpatient   Admission Date: 4/26/2018  Length of Stay: 10 days  Attending Physician: Sosa Holder MD  Primary Care Provider: Hannah Andino MD        Subjective:     Principal Problem:Cerebrovascular accident (CVA) due to stenosis of cerebral artery    HPI:  Jigar Michaels is a 62 y.o. male that (in part)  has a past medical history of Diabetes mellitus; Hypertension; and Stroke.  Presents to [facility] Emergency Department complaining of generalized weakness.  Subacute onset 1 week ago with progressive worsening.  Last known to be normal several days ago.  Was found to be significantly worse this morning by the patient's family member reports significant worsening of the left upper and lower extremity chronic weakness from a previous stroke.  He is unable to communicate effectively and is coughing, which may be consistent with aspiration.  Denies fever, chills, or recent infection.  No recent hospitalizations.  No report of syncope or collapse.  Significantly decreased oral intake over the last few days.  Patient was refusing to come to the Hospital prior to this.  Unfortunately due to the current condition of the patient remained of the history is limited    In the emergency department routine laboratory studies, CT of the head, and a consultation to tele-neurology were performed.  There is concern for acute CVA.  He is being admitted for stroke workup and neurology consultation.  MRI of the morning.    Hospital medicine has been asked to admit for further evaluation and treatment.    Hospital Course:  MRI confirms acute CVA with concern for possible embolic cause. ECHO showed new CHF, therefore, Cardiology consulted.  Neurology consulted. Continue permissive HTN and will likely need rehab for placement. Consulted social service for rehab placement.  Patient  apparently aspirated after being fed some meat by wife on 4/28.  Became more hypoxic and transferred to ICU. Respiratory status continued to worsen requiring endotracheal intubation. Weaned off sedation on 5/2 and remained off sedation while on MV. Only able to make eye contact intermittently, otherwise unable to move extremities when asked to and evidently with babinski sign. These findings are concerning for significant basilar artery involvement. ON 5/5 patient had more coughing spells and making more eye contact when called by his daughter in [place]. Fentanyl PRN given for comfort. Wife opted for a DNR code but preferred to proceed with trach and PEG placement. ENT and GI consulted. Plavix held temporarily for these procedures.     Interval History: with prolonged and frequent coughing spells. Easily arousable to verbal command and able to make eye contact. Started on water boluses yesterday for mild hypernatremia. Good gas exchange on minimal vent support. PO2 68    Review of Systems   Unable to perform ROS: Intubated     Objective:     Vital Signs (Most Recent):  Temp: 99.8 °F (37.7 °C) (05/05/18 2300)  Pulse: 69 (05/06/18 0733)  Resp: 15 (05/06/18 0733)  BP: 139/63 (05/06/18 0700)  SpO2: 98 % (05/06/18 0733) Vital Signs (24h Range):  Temp:  [98.6 °F (37 °C)-100 °F (37.8 °C)] 99.8 °F (37.7 °C)  Pulse:  [63-88] 69  Resp:  [15-66] 15  SpO2:  [95 %-100 %] 98 %  BP: (127-179)/(60-82) 139/63     Weight: 62.3 kg (137 lb 5.6 oz)  Body mass index is 22.86 kg/m².    Intake/Output Summary (Last 24 hours) at 05/06/18 0802  Last data filed at 05/06/18 0528   Gross per 24 hour   Intake             1510 ml   Output             1050 ml   Net              460 ml      Physical Exam   Constitutional: He appears well-developed. No distress.   HENT:   Head: Normocephalic and atraumatic.   ETT in place   Eyes: Pupils are equal, round, and reactive to light.   Cardiovascular: Normal rate and regular rhythm.    Pulmonary/Chest:  Effort normal. He has no rales.   On MV   Abdominal: Soft. Bowel sounds are normal. He exhibits no distension.   Musculoskeletal: Normal range of motion. He exhibits no edema.   Neurological:   Gag reflex present  Made eye contact when called by daughter  Positive babinski sign. Decreased DTR to right patella. Downward gaze. Pupils reactive to light but sluggish. No clonus. Does not move extremities when asked to   Skin: He is not diaphoretic.   Nursing note and vitals reviewed.      Significant Labs: All pertinent labs within the past 24 hours have been reviewed.    Significant Imaging: I have reviewed all pertinent imaging results/findings within the past 24 hours.  I have reviewed and interpreted all pertinent imaging results/findings within the past 24 hours.    Assessment/Plan:      * Cerebrovascular accident (CVA) due to stenosis of cerebral artery    MRI showed acute lacunar-type infarctions involving the superior right terrence and in the left cerebral peduncle as above.  MRA with extensive diffuse cerebrovascular disease with multiple areas of high grade stenosis.  Neurology discussed findings with stroke service at [facility]. Recommended continuing optimized medical therapy  On statin and ASA. Plavix held temporarily for planned trach/PEG. Adjusting insulin for better BG control.    BP stable. Degree of LV systolic dysfunction is a limiting factor. Off vasopressor support  Neurologically, he can only make eye contact and now with gag reflex but no other meaningful recovery appreciated.  Discussed goals of care with family. Interested in Trach and PEG placement but to change code status to DNR. Wife and daughter understand what DNR is. Consulted GI and ENT for evaluation for PEG and trach placement. Clopidogrel on hold since 5/5 as he has to be off of this at least 5 days prior to surgery.             Basilar artery stenosis/occlusion    As seen on MRA. This is a concerning finding and appears to have  progressed despite supportive measures. He does have significant stenosis. Neurologist, Pulmonologist and myself discussed with wife, two sons and daughter end of life measures vs trach/PEG. Family opting for trach and PEG placement as he is making eye contact but no other neuro improvement appreciated. Locked in?          On mechanically assisted ventilation    On day # 7 of vent. Famotidine for stress ulcer proph. Pulm on board for co-management.           Aspiration pneumonia    With low grade fever on moxi. Will change to pip-tazo. On vent support. Needs suctioning. On tube feeds. Good gas exchange on minimal vent support, but pO2 only 68. Will increase FiO2 to 35%          Goals of care, counseling/discussion    As above          Gross hematuria    resolved          Atherosclerotic cerebrovascular disease    Seen on MRA. Likely 2/2 to poorly controlled DM2          Diffuse cerebral atrophy    Seen on MRI. Likely 2/2 chronic microvascular disease.           Cerebellar atrophy    Seen on MRI. Likely 2/2 chronic microvascular disease.           Aspiration into airway    Choked on food being fed to him by wife on 4/28. Declined respiratory wise and now intubated.  Resp Cx not impressive. BCx thus far negative. 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  Cardiology consulted. Planning on ischemic workup with LHC vs NST pending improvement of renal fx  On ASA, BB and statin. Plavix on hold          Acute renal failure    Last Creat we have in record is from 2012.  May have underlying CKD secondary to HTN and DM   Holding diuresis as it is not indicated at this time  Is also on ASA supp 300 mg which patient currently needs  Avoid any other nephrotoxin as much as possible as he would be a poor candidate for RRT if he were to need it  Add free water enteric boluses  Mcghee in place. Strict I/Os. Renal panel in  AM          Hyperglycemia due to type 2 diabetes mellitus    Adjust insulin therapy for goal BG < 180          CVA, old, hemiparesis    As above          Type 2 diabetes mellitus with hyperglycemia, with long-term current use of insulin    Uncontrolled with hyperglycemia.  Elevated HgA1c to 8.6% despite insulin therapy at home. Dietary non compliance?  BG not at goal. Will adjust long acting insulin. Adjust as needed for goal BG < 180. On tube feeds           Hypertension    Difficult to allow permissive HTN given patient's LV systolic depression to 35% (per TTE), however he may no longer benefit from permissive HTN as he is > 72 hrs post new stroke. Is currently off antihypertensive therapy.           Other dysphagia    Apparent episode of aspiration 4/28. Has significant cerebrovascular disease and multiple strokes. Dysphagia unlikely to resolve. GI consulted for PEG          VTE Risk Mitigation         Ordered     enoxaparin injection 30 mg  Daily      05/02/18 1803     IP VTE LOW RISK PATIENT  Once      04/27/18 0248     Place sequential compression device  Until discontinued      04/27/18 0248        Discussed with daughter at bedside    Critical care time spent on the evaluation and treatment of severe organ dysfunction, review of pertinent labs and imaging studies, discussions with consulting providers and discussions with patient/family: > 35 minutes.    Sosa Tellez MD  Department of Hospital Medicine   [facility]

## 2018-05-06 NOTE — PROGRESS NOTES
Received patient on vent settings PRVC 15/450/+5/FIO2 30% ETT size 7.0 secured at 24cm at the lip Ambu bag and mask at bed side

## 2018-05-06 NOTE — EICU
eICU Note : Ventilator Rounds Vent day5  Input/Output:+320  CXR:Clear  On Mechanical ventilator :settings:   Mode: PRVC       RR   PEEP  FiO2       PIP  SaO2  Last AB.52/31.7/99/26/FiO2 30%  Checked Readiness to wean: P/F ratio 330  F/TV   PEEP +5  Sedation on/off:Fentanyl PRN    Problem:CVA s/p Intubation    Pertinent History and labs reviewed :62-year-old male with history of Diabetes Mellitus , With left upper and lower extremity weakness (left hemiparesis)    Treatment /Intervention given:CVA on mechanical Ventilation.Pt continues to be unresponsive.SBT trial pending change in mental status         Teresa Preston M.D  eICU Physician

## 2018-05-06 NOTE — ASSESSMENT & PLAN NOTE
With low grade fever on moxi. Will change to pip-tazo. On vent support. Needs suctioning. On tube feeds. Good gas exchange on minimal vent support, but pO2 only 68. Will increase FiO2 to 35%

## 2018-05-06 NOTE — ASSESSMENT & PLAN NOTE
MRI showed acute lacunar-type infarctions involving the superior right terrence and in the left cerebral peduncle as above.  MRA with extensive diffuse cerebrovascular disease with multiple areas of high grade stenosis.  Neurology discussed findings with stroke service at [facility]. Recommended continuing optimized medical therapy  On statin and ASA. Plavix held temporarily for planned trach/PEG. Adjusting insulin for better BG control.    BP stable. Degree of LV systolic dysfunction is a limiting factor. Off vasopressor support  Neurologically, he can only make eye contact and now with gag reflex but no other meaningful recovery appreciated.  Discussed goals of care with family. Interested in Trach and PEG placement but to change code status to DNR. Wife and daughter understand what DNR is. Consulted GI and ENT for evaluation for PEG and trach placement. Clopidogrel on hold since 5/5 as he has to be off of this at least 5 days prior to surgery.

## 2018-05-06 NOTE — SUBJECTIVE & OBJECTIVE
Interval History: with prolonged and frequent coughing spells. Easily arousable to verbal command and able to make eye contact. Started on water boluses yesterday for mild hypernatremia. Good gas exchange on minimal vent support. PO2 68    Review of Systems   Unable to perform ROS: Intubated     Objective:     Vital Signs (Most Recent):  Temp: 99.8 °F (37.7 °C) (05/05/18 2300)  Pulse: 69 (05/06/18 0733)  Resp: 15 (05/06/18 0733)  BP: 139/63 (05/06/18 0700)  SpO2: 98 % (05/06/18 0733) Vital Signs (24h Range):  Temp:  [98.6 °F (37 °C)-100 °F (37.8 °C)] 99.8 °F (37.7 °C)  Pulse:  [63-88] 69  Resp:  [15-66] 15  SpO2:  [95 %-100 %] 98 %  BP: (127-179)/(60-82) 139/63     Weight: 62.3 kg (137 lb 5.6 oz)  Body mass index is 22.86 kg/m².    Intake/Output Summary (Last 24 hours) at 05/06/18 0802  Last data filed at 05/06/18 0528   Gross per 24 hour   Intake             1510 ml   Output             1050 ml   Net              460 ml      Physical Exam   Constitutional: He appears well-developed. No distress.   HENT:   Head: Normocephalic and atraumatic.   ETT in place   Eyes: Pupils are equal, round, and reactive to light.   Cardiovascular: Normal rate and regular rhythm.    Pulmonary/Chest: Effort normal. He has no rales.   On MV   Abdominal: Soft. Bowel sounds are normal. He exhibits no distension.   Musculoskeletal: Normal range of motion. He exhibits no edema.   Neurological:   Gag reflex present  Made eye contact when called by daughter  Positive babinski sign. Decreased DTR to right patella. Downward gaze. Pupils reactive to light but sluggish. No clonus. Does not move extremities when asked to   Skin: He is not diaphoretic.   Nursing note and vitals reviewed.      Significant Labs: All pertinent labs within the past 24 hours have been reviewed.    Significant Imaging: I have reviewed all pertinent imaging results/findings within the past 24 hours.  I have reviewed and interpreted all pertinent imaging results/findings  within the past 24 hours.

## 2018-05-06 NOTE — PLAN OF CARE
Problem: Diabetes, Type 2 (Adult)  Goal: Signs and Symptoms of Listed Potential Problems Will be Absent, Minimized or Managed (Diabetes, Type 2)  Signs and symptoms of listed potential problems will be absent, minimized or managed by discharge/transition of care (reference Diabetes, Type 2 (Adult) CPG).   Outcome: Ongoing (interventions implemented as appropriate)   05/06/18 1718   Diabetes, Type 2   Problems Assessed (Type 2 Diabetes) all   Problems Present (Type 2 Diabetes) hyperglycemia       Problem: Fall Risk (Adult)  Goal: Identify Related Risk Factors and Signs and Symptoms  Related risk factors and signs and symptoms are identified upon initiation of Human Response Clinical Practice Guideline (CPG)   Outcome: Ongoing (interventions implemented as appropriate)   05/06/18 1718   Fall Risk   Related Risk Factors (Fall Risk) culprit medication(s);fatigue/slow reaction;neuro disease/injury;sensory deficits;environment unfamiliar   Signs and Symptoms (Fall Risk) presence of risk factors       Problem: Pressure Ulcer Risk (Luigi Scale) (Adult,Obstetrics,Pediatric)  Goal: Identify Related Risk Factors and Signs and Symptoms  Related risk factors and signs and symptoms are identified upon initiation of Human Response Clinical Practice Guideline (CPG)   Outcome: Ongoing (interventions implemented as appropriate)   05/06/18 1718   Pressure Ulcer Risk (Luigi Scale)   Related Risk Factors (Pressure Ulcer Risk (Luigi Scale)) cognitive impairment;critical care admission;medical devices;medication;mobility impaired       Problem: Patient Care Overview  Goal: Plan of Care Review  Outcome: Ongoing (interventions implemented as appropriate)  Remains on ventilator.  Fentanyl admin for sedation/comfort.  Large amt thick tan secretions suctioned from oral cavity; small - moderate amount via ETT.  Cardiac monitor shows SR.  Gag reflex appears more sensitive.  Occassional spontaneous eye opening noted when family at bedside.   Does not follow any commands. Tolerating tube feeds at goal.  No recent BMs; Miralax change to BID.  Scheduled insulin every 6 hours added to sliding scale insulin q 6 hrs.  Remains free from falls or other hospital acquired injury this shift.    Problem: Infection, Risk/Actual (Adult)  Goal: Identify Related Risk Factors and Signs and Symptoms  Related risk factors and signs and symptoms are identified upon initiation of Human Response Clinical Practice Guideline (CPG)   Outcome: Ongoing (interventions implemented as appropriate)   05/05/18 1807   Infection, Risk/Actual   Related Risk Factors (Infection, Risk/Actual) exposure to microbes   Signs and Symptoms (Infection, Risk/Actual) blood glucose changes;body temperature changes     Goal: Infection Prevention/Resolution  Patient will demonstrate the desired outcomes by discharge/transition of care.   Outcome: Ongoing (interventions implemented as appropriate)   05/06/18 1718   Infection, Risk/Actual (Adult)   Infection Prevention/Resolution making progress toward outcome       Problem: Stroke (Ischemic) (Adult)  Goal: Signs and Symptoms of Listed Potential Problems Will be Absent, Minimized or Managed (Stroke)  Signs and symptoms of listed potential problems will be absent, minimized or managed by discharge/transition of care (reference Stroke (Ischemic) (Adult) CPG).   Outcome: Ongoing (interventions implemented as appropriate)   05/05/18 1807   Stroke (Ischemic)   Problems Assessed (Stroke (Ischemic)/TIA) all   Problems Present (Stroke (Ischemic)/TIA) cognitive impairment;communication impairment;situational response       Problem: Ventilation, Mechanical Invasive (Adult)  Goal: Signs and Symptoms of Listed Potential Problems Will be Absent, Minimized or Managed (Ventilation, Mechanical Invasive)  Signs and symptoms of listed potential problems will be absent, minimized or managed by discharge/transition of care (reference Ventilation, Mechanical Invasive (Adult)  CPG).   Outcome: Ongoing (interventions implemented as appropriate)   05/05/18 1807   Ventilation, Mechanical Invasive   Problems Assessed (Mechanical Ventilation, Invasive) all   Problems Present (Mechanical Ventilation, Invasive) immobility;inability to wean

## 2018-05-06 NOTE — PROGRESS NOTES
Patient received on servo I PRVC 15 Vt 450 peep 5 Fio2 30% 7.0 Et tube taped at 24cm aerosol treamtnets with duoneb q6 prn. Suction small to moderate secretions

## 2018-05-07 NOTE — ASSESSMENT & PLAN NOTE
Prognosis remained poor due to recurrent cva.  Currently unresponsive on vent without sedation.  Plan is for possible peg/trache.  Family is undecided per my conversation.  DNR.

## 2018-05-07 NOTE — SUBJECTIVE & OBJECTIVE
HPI:  62 y.o. male that (in part)  has a past medical history of Diabetes mellitus; Hypertension; and Stroke.  Presents to [facility] Emergency Department complaining of generalized weakness.  Subacute onset 1 week ago with progressive worsening.  Last known to be normal several days ago.  Was found to be significantly worse this morning by the patient's family member reports significant worsening of the left upper and lower extremity chronic weakness from a previous stroke.  He is unable to communicate effectively and is coughing, which may be consistent with aspiration.  Denies fever, chills, or recent infection.  No recent hospitalizations.  No report of syncope or collapse.  Significantly decreased oral intake over the last few days.  Patient was refusing to come to the Hospital prior to this.  Unfortunately due to the current condition of the patient remained of the history is limited   Patient with episode of aspiration on the floor. He was brought to the ICU. Last night intubated for respiratory failure.   Pulmonary consulted for ventilator management and ICU co management.     Hospital course:    -4/28/18: Pt with severe coughing when eating.     -4/29/18: Transferred to ICU after episode aspiration while eating.     -4/30/18: Pt is more dysarthric.      -5/1/18: Intubated overnight. Off sedation pt is not responsive.     -5/2/18: Pt moving feet spontaneously but following commands.      -5/3/18: Remains off sedation. Has not been following commands.  Interval History: no acute issue.  No interactive without sedation    Objective:     Vital Signs (Most Recent):  Temp: 98.5 °F (36.9 °C) (05/07/18 0730)  Pulse: 73 (05/07/18 0839)  Resp: 16 (05/07/18 0839)  BP: (!) 147/65 (05/07/18 0802)  SpO2: 100 % (05/07/18 0839) Vital Signs (24h Range):  Temp:  [98.5 °F (36.9 °C)-99.9 °F (37.7 °C)] 98.5 °F (36.9 °C)  Pulse:  [57-78] 73  Resp:  [15-67] 16  SpO2:  [97 %-100 %] 100 %  BP:  (110-160)/(54-88) 147/65     Weight: 62.3 kg (137 lb 5.6 oz)  Body mass index is 22.86 kg/m².      Intake/Output Summary (Last 24 hours) at 05/07/18 1210  Last data filed at 05/07/18 0800   Gross per 24 hour   Intake             1490 ml   Output              730 ml   Net              760 ml       Physical Exam   Constitutional: He appears well-developed. No distress.   HENT:   Head: Normocephalic and atraumatic.   ETT in place   Eyes: Pupils are equal, round, and reactive to light.   Cardiovascular: Normal rate and regular rhythm.    Pulmonary/Chest: Effort normal. He has no rales.   On MV   Abdominal: Soft. Bowel sounds are normal. He exhibits no distension.   Musculoskeletal: Normal range of motion. He exhibits no edema.   Neurological:   Gag reflex present  Not responsive to tactile stimulation   Skin: He is not diaphoretic.   Nursing note and vitals reviewed.      Vents:  Vent Mode: PRVC (05/07/18 1032)  Ventilator Initiated: Yes (05/01/18 0021)  Set Rate: 15 bmp (05/07/18 0504)  Vt Set: 450 mL (05/07/18 0504)  PEEP/CPAP: 5 cmH20 (05/07/18 0504)  Oxygen Concentration (%): 35 (05/07/18 0839)  Peak Airway Pressure: 48.3 cmH2O (05/07/18 0504)  Total Ve: 9.3 mL (05/07/18 0504)  F/VT Ratio<105 (RSBI): (!) 68.64 (05/07/18 0504)    Lines/Drains/Airways     Drain                 Urethral Catheter 04/29/18 1620 16 Fr. 7 days         NG/OG Tube 04/30/18 1705 Left nostril 6 days          Airway                 Airway - Non-Surgical 04/30/18 2357 Endotracheal Tube 6 days          Peripheral Intravenous Line                 Peripheral IV - Single Lumen 05/05/18 0029 Right Wrist 2 days         Peripheral IV - Single Lumen 05/06/18 0200 Right Forearm 1 day                Significant Labs:    CBC/Anemia Profile:  No results for input(s): WBC, HGB, HCT, PLT, MCV, RDW, IRON, FERRITIN, RETIC, FOLATE, [id number], OCCULTBLOOD in the last 48 hours.     Chemistries:    Recent Labs  Lab 05/06/18  0743 05/07/18  0821   * 146*   K  3.8 3.2*   * 113*   CO2 23 26   BUN 57* 60*   CREATININE 2.7* 3.1*   CALCIUM 8.4* 8.4*   ALBUMIN 2.1* 2.1*   PROT  --  6.2   BILITOT  --  0.5   ALKPHOS  --  71   ALT  --  76*   AST  --  65*   PHOS 2.8  --        ABGs:   Recent Labs  Lab 05/07/18  0500   PH 7.478*   PCO2 33.5*   HCO3 24.8   POCSATURATED 98   BE 1       Significant Imaging:  CT: I have reviewed all pertinent results/findings within the past 24 hours and my personal findings are:  head no acute bleed  CXR: I have reviewed all pertinent results/findings within the past 24 hours and my personal findings are:  5/7/18 no effusion or consolidation     MRI 5/1/18 multiple are of infarct in posterior circulation

## 2018-05-07 NOTE — HOSPITAL COURSE
Interval History: 63 y/o male with medical Hx as listed below comes to ED for weakness on left side. Pt has Hx of stroke with residual left sided weakness but family reports worsening of motor deficits. His wife states that he is able to walk and feed himself at baseline but his speech is slurred. He is adherent to medications according to wife. Pt has already had at least two strokes.     -4/28/18: Pt with severe coughing when eating.     -4/29/18: Transferred to ICU after episode aspiration while eating.     -4/30/18: Pt is more dysarthric.      -5/1/18: Intubated overnight. Off sedation pt is not responsive.     -5/2/18: Pt moving feet spontaneously but following commands.      -5/3/18: Remains off sedation. Has not been following commands.    -5/7/18: Patient still off sedation.  Not following commands.  Patient does track across the midline to verbal command, he has seemingly been more responsive to family members when they speak to the patient [place] rather than his healthcare team.    -05/08/18: Family meeting was done today with pulmonary and ICU staff.  Comfort measures are felt to be the patient's best course of action.  He will be extubated today..

## 2018-05-07 NOTE — PLAN OF CARE
Problem: Patient Care Overview  Goal: Plan of Care Review  Outcome: Ongoing (interventions implemented as appropriate)  Pt remains in ICU. Family at bedside overnight. Opens eyes and moves extremities to any stimulus but still not following commands. Frequent coughing, PRN Fentanyl given for sedation/comfort. IV antibiotics. VSS.

## 2018-05-07 NOTE — SUBJECTIVE & OBJECTIVE
Subjective:     Interval History: Interval History: 61 y/o male with medical Hx as listed below comes to ED for weakness on left side. Pt has Hx of stroke with residual left sided weakness but family reports worsening of motor deficits. His wife states that he is able to walk and feed himself at baseline but his speech is slurred. He is adherent to medications according to wife. Pt has already had at least two strokes.     -4/28/18: Pt with severe coughing when eating.     -4/29/18: Transferred to ICU after episode aspiration while eating.     -4/30/18: Pt is more dysarthric.      -5/1/18: Intubated overnight. Off sedation pt is not responsive.     -5/2/18: Pt moving feet spontaneously but following commands.      -5/3/18: Remains off sedation. Has not been following commands.    -5/7/18: Patient still off sedation.  Not following commands.  Patient does track across the midline to verbal command, he has seemingly been more responsive to family members when they speak to the patient [place] rather than his healthcare team.    Current Neurological Medications:     Current Facility-Administered Medications   Medication Dose Route Frequency Provider Last Rate Last Dose    albuterol-ipratropium 2.5mg-0.5mg/3mL nebulizer solution 3 mL  3 mL Nebulization Q4H PRN Alexander Florence MD   3 mL at 05/07/18 0739    aspirin tablet 325 mg  325 mg Per OG tube Daily Sosa Holder MD   325 mg at 05/07/18 0821    atorvastatin tablet 80 mg  80 mg Per OG tube Daily Cristóbal Batista MD   80 mg at 05/07/18 0821    cefTRIAXone injection 1 g  1 g Intravenous Q24H Sosa Holder MD        chlorhexidine 0.12 % solution 15 mL  15 mL Mouth/Throat BID Cristóbal Batista MD   15 mL at 05/07/18 0821    dextrose 50% injection 12.5 g  12.5 g Intravenous PRN Silvano Velasquez MD        famotidine (PF) injection 20 mg  20 mg Intravenous Daily Cristóbal Batista MD   20 mg at 05/07/18 1239    fentaNYL injection 50 mcg  50 mcg  Intravenous Q1H PRN Silvano Velasquez MD   50 mcg at 05/07/18 0034    glucagon (human recombinant) injection 1 mg  1 mg Intramuscular PRN Silvano Velasquez MD        insulin aspart U-100 pen 1-10 Units  1-10 Units Subcutaneous Q6H PRN Silvano Velasquez MD   4 Units at 05/07/18 1243    insulin aspart U-100 pen 8 Units  8 Units Subcutaneous Q6H Sosa Holder MD   8 Units at 05/07/18 1243    insulin detemir U-100 pen 25 Units  25 Units Subcutaneous QHS Sosa Holder MD   25 Units at 05/06/18 2000    labetalol injection 10 mg  10 mg Intravenous Q15 Min PRN Silvano Velasquez MD   10 mg at 05/03/18 0236    [START ON 5/8/2018] linezolid 100 mg/5 mL suspension 600 mg  600 mg Per NG tube Q12H Sosa Holder MD        metoprolol tartrate (LOPRESSOR) tablet 25 mg  25 mg Per OG tube BID Sosa Holder MD   25 mg at 05/07/18 0821    mineral oil enema 1 enema  1 enema Rectal Daily PRN Silvano Velasquez MD        ondansetron disintegrating tablet 8 mg  8 mg Oral Q8H PRN Silvano Velasquez MD   8 mg at 04/28/18 0959    polyethylene glycol packet 17 g  17 g Per G Tube BID Sosa Holder MD   17 g at 05/07/18 0819    promethazine suppository 25 mg  25 mg Rectal Q6H PRN Silvano Velasquez MD        senna-docusate 8.6-50 mg per tablet 1 tablet  1 tablet Per OG tube BID Sosa Holder MD   1 tablet at 05/07/18 0821    sodium chloride 0.9% bolus 500 mL  500 mL Intravenous Continuous PRN Silvano Velasquez MD        sodium chloride 0.9% flush 3 mL  3 mL Intravenous Q8H Silvano Velasquez MD   3 mL at 05/07/18 0600       Review of Systems   Unable to perform ROS: Mental status change     Objective:     Vital Signs (Most Recent):  Temp: 99.2 °F (37.3 °C) (05/07/18 1600)  Pulse: 66 (05/07/18 1523)  Resp: 15 (05/07/18 1523)  BP: (!) 155/74 (05/07/18 1502)  SpO2: 100 % (05/07/18 1523) Vital Signs (24h Range):  Temp:  [98.5 °F (36.9 °C)-99.9 °F (37.7 °C)] 99.2 °F (37.3 °C)  Pulse:  [53-78]  66  Resp:  [15-49] 15  SpO2:  [97 %-100 %] 100 %  BP: (104-160)/(53-88) 155/74     Weight: 62.3 kg (137 lb 5.6 oz)  Body mass index is 22.86 kg/m².    Physical Exam   Constitutional:   Intubated; not sedated   HENT:   Head: Normocephalic and atraumatic.   Eyes: Pupils are equal, round, and reactive to light.   Cardiovascular: Intact distal pulses.    Pulmonary/Chest: Effort normal. No respiratory distress.   Musculoskeletal: Normal range of motion.   Neurological:   Reflex Scores:       Tricep reflexes are 2+ on the right side and 2+ on the left side.       Bicep reflexes are 2+ on the right side and 2+ on the left side.       Brachioradialis reflexes are 2+ on the right side and 2+ on the left side.       Patellar reflexes are 2+ on the right side and 2+ on the left side.       Achilles reflexes are 2+ on the right side and 2+ on the left side.  Skin: Skin is warm and dry.   Psychiatric: He has a normal mood and affect. His behavior is normal.       NEUROLOGICAL EXAMINATION:     MENTAL STATUS        Intubated; tracks across midline     CRANIAL NERVES     CN III, IV, VI   Pupils are equal, round, and reactive to light.  Right pupil: Size: 3 mm. Shape: regular. Reactivity: brisk.   Left pupil: Size: 3 mm. Shape: regular. Reactivity: brisk.   Nystagmus: none   Ophthalmoparesis: none    CN VII   Right facial weakness: none  Left facial weakness: none    MOTOR EXAM   Muscle bulk: normal  Overall muscle tone: normal    REFLEXES     Reflexes   Right brachioradialis: 2+  Left brachioradialis: 2+  Right biceps: 2+  Left biceps: 2+  Right triceps: 2+  Left triceps: 2+  Right patellar: 2+  Left patellar: 2+  Right achilles: 2+  Left achilles: 2+  Right plantar: upgoing  Left plantar: upgoing    GAIT AND COORDINATION     Tremor   Resting tremor: absent      Significant Labs: All pertinent lab results from the past 24 hours have been reviewed.    Significant Imaging: none

## 2018-05-07 NOTE — PLAN OF CARE
Problem: Diabetes, Type 2 (Adult)  Goal: Signs and Symptoms of Listed Potential Problems Will be Absent, Minimized or Managed (Diabetes, Type 2)  Signs and symptoms of listed potential problems will be absent, minimized or managed by discharge/transition of care (reference Diabetes, Type 2 (Adult) CPG).   Outcome: Ongoing (interventions implemented as appropriate)   05/06/18 1718   Diabetes, Type 2   Problems Assessed (Type 2 Diabetes) all   Problems Present (Type 2 Diabetes) hyperglycemia       Problem: Fall Risk (Adult)  Goal: Identify Related Risk Factors and Signs and Symptoms  Related risk factors and signs and symptoms are identified upon initiation of Human Response Clinical Practice Guideline (CPG)   Outcome: Ongoing (interventions implemented as appropriate)   05/06/18 1718   Fall Risk   Related Risk Factors (Fall Risk) culprit medication(s);fatigue/slow reaction;neuro disease/injury;sensory deficits;environment unfamiliar   Signs and Symptoms (Fall Risk) presence of risk factors     Goal: Absence of Falls  Patient will demonstrate the desired outcomes by discharge/transition of care.   Outcome: Ongoing (interventions implemented as appropriate)   05/07/18 1715   Fall Risk (Adult)   Absence of Falls making progress toward outcome       Problem: Pressure Ulcer Risk (Luigi Scale) (Adult,Obstetrics,Pediatric)  Goal: Identify Related Risk Factors and Signs and Symptoms  Related risk factors and signs and symptoms are identified upon initiation of Human Response Clinical Practice Guideline (CPG)   Outcome: Ongoing (interventions implemented as appropriate)   05/06/18 1718   Pressure Ulcer Risk (Luigi Scale)   Related Risk Factors (Pressure Ulcer Risk (Luigi Scale)) cognitive impairment;critical care admission;medical devices;medication;mobility impaired     Goal: Skin Integrity  Patient will demonstrate the desired outcomes by discharge/transition of care.   Outcome: Ongoing (interventions implemented as  appropriate)   05/07/18 1715   Pressure Ulcer Risk (Luigi Scale) (Adult,Obstetrics,Pediatric)   Skin Integrity making progress toward outcome       Problem: Patient Care Overview  Goal: Plan of Care Review  Outcome: Ongoing (interventions implemented as appropriate)  Remains on ventilator.  More alert today.  Eyes open spontaneously, track movements in room.  Able to look to the left or the right when asked by family.  Does not move extremeties on command or follow any other instructions.  Moderate amt of thick blood tinged secretions from oral cavity.  Mcghee cath with mild hematuria.  BMs x 3 this shift. Remains free from falls or other injury this shift.    Problem: Infection, Risk/Actual (Adult)  Goal: Identify Related Risk Factors and Signs and Symptoms  Related risk factors and signs and symptoms are identified upon initiation of Human Response Clinical Practice Guideline (CPG)   Outcome: Ongoing (interventions implemented as appropriate)   05/05/18 1807   Infection, Risk/Actual   Related Risk Factors (Infection, Risk/Actual) exposure to microbes   Signs and Symptoms (Infection, Risk/Actual) blood glucose changes;body temperature changes     Goal: Infection Prevention/Resolution  Patient will demonstrate the desired outcomes by discharge/transition of care.   Outcome: Ongoing (interventions implemented as appropriate)   05/07/18 1715   Infection, Risk/Actual (Adult)   Infection Prevention/Resolution making progress toward outcome       Problem: Ventilation, Mechanical Invasive (Adult)  Goal: Signs and Symptoms of Listed Potential Problems Will be Absent, Minimized or Managed (Ventilation, Mechanical Invasive)  Signs and symptoms of listed potential problems will be absent, minimized or managed by discharge/transition of care (reference Ventilation, Mechanical Invasive (Adult) CPG).   Outcome: Ongoing (interventions implemented as appropriate)   05/05/18 1807   Ventilation, Mechanical Invasive   Problems  Assessed (Mechanical Ventilation, Invasive) all   Problems Present (Mechanical Ventilation, Invasive) immobility;inability to wean

## 2018-05-07 NOTE — PROGRESS NOTES
Received patient on vent settings PRVC 15/450/+5/FIO2 35% ETT size 7.0 secured at 24cm at the lip Ambu bag and mask at bed side

## 2018-05-07 NOTE — EICU
Vent Day # 7    61 y/o M with previous history of stroke presented with acute CVA intubated for airway protection    Camera assessment:  PEEP 5 RR 15 FiO2 35% Peak pressure 23     5/7/2018 05:00   POC PH 7.478 (H)   POC PCO2 33.5 (L)   POC PO2 97   POC BE 1   POC HCO3 24.8   POC SATURATED O2 98   POC TCO2 26   FiO2 35   Vt 450   PiP 24   PEEP 5     · Noted plans for trach and PEG  · Mental status prohibiting extubation  · Continue spontaneous breathing trials daily  · Avoid sedating

## 2018-05-07 NOTE — ASSESSMENT & PLAN NOTE
With low grade fever on moxi. Changed to pip-tazo. On vent support.  On tube feeds. Good gas exchange on minimal vent support

## 2018-05-07 NOTE — PROGRESS NOTES
Chief Complaint / Reason for Consult: Feeding difficulties, PEG evaluation    Tolerating TF's.  Remains on Vent, intubated    ROS: Unable to obtain, secondary to current clinical condition    Patient Vitals for the past 24 hrs:   BP Temp Temp src Pulse Resp SpO2   05/07/18 1335 - - - (!) 57 15 98 %   05/07/18 1302 (!) 104/55 - - - - -   05/07/18 1300 - - - (!) 56 15 98 %   05/07/18 1202 (!) 154/69 - - - - -   05/07/18 1200 - - - 67 18 100 %   05/07/18 1102 (!) 105/53 - - - - -   05/07/18 1100 (!) 105/53 99.5 °F (37.5 °C) Oral (!) 57 15 99 %   05/07/18 1000 - - - (!) 53 15 99 %   05/07/18 0902 (!) 122/56 - - - - -   05/07/18 0839 - - - 73 16 100 %   05/07/18 0802 (!) 147/65 - - - - -   05/07/18 0739 - - - (!) 57 15 98 %   05/07/18 0730 - 98.5 °F (36.9 °C) Oral (!) 58 15 98 %   05/07/18 0702 (!) 156/68 - - - - -   05/07/18 0700 - - - 68 15 99 %   05/07/18 0600 (!) 152/66 - - 70 20 100 %   05/07/18 0504 125/88 - - 78 (!) 44 100 %   05/07/18 0500 - - - 64 (!) 41 99 %   05/07/18 0400 (!) 118/56 - - (!) 59 15 97 %   05/07/18 0332 - - - 76 16 100 %   05/07/18 0300 (!) 113/55 98.9 °F (37.2 °C) Oral 60 15 98 %   05/07/18 0200 (!) 110/55 - - (!) 59 15 98 %   05/07/18 0131 - - - (!) 59 (!) 45 98 %   05/07/18 0100 (!) 111/54 - - 61 15 98 %   05/07/18 0000 (!) 160/72 99.9 °F (37.7 °C) - 60 15 99 %   05/06/18 2356 - - - 60 (!) 49 98 %   05/06/18 2300 139/65 - - 61 15 98 %   05/06/18 2200 133/60 - - 66 15 98 %   05/06/18 2139 - - - 67 15 99 %   05/06/18 2100 (!) 120/58 - - 68 15 98 %   05/06/18 2000 135/60 99.4 °F (37.4 °C) Oral 71 15 99 %   05/06/18 1940 - - - (!) 59 (!) 38 98 %   05/06/18 1900 (!) 110/55 - - (!) 59 (!) 43 98 %   05/06/18 1717 - - - 75 17 98 %   05/06/18 1500 (!) 144/66 - - 65 (!) 67 98 %       Physical Exam:  Gen - Well developed, well nourished, intubated, on vent  HEENT - Anicteric, + ET tube  CV - S1, S2, no murmurs/rubs  Lungs - CTA-B, normal excursion  Abd - Soft, NT, ND, normal BS's, no HSM.  Ext - No  c/c/e  Neuro - No asterixis    Labs:    Recent Labs  Lab 05/07/18  0821   CALCIUM 8.4*   PROT 6.2   *   K 3.2*   CO2 26   *   BUN 60*   CREATININE 3.1*   ALKPHOS 71   ALT 76*   AST 65*   BILITOT 0.5       Imaging:  Reviewed CXR, no acute changes    Assessment:  This patient is a 62 y.o. male with:   1. Dysphagia, Feeding difficulties - secondary to CVA.  Had episode of aspiration with subsequent respiratory failure.  Will likely need long term ventilator support.  Tolerating TF's  2. CVA - with Resp. Failure.  3. DM, HTN....    Recommendations:  1. Continue with supportive care.  2. TF's as ordered.  3. Plavix is on hold.  4. EGD/PEG Wednesday, if family wishes to pursue.  5. Continue with family discussion about Goals of Care.

## 2018-05-07 NOTE — SUBJECTIVE & OBJECTIVE
Interval History: still making eye contact when called. No coughing today. Renal function worse.     Review of Systems   Unable to perform ROS: Intubated     Objective:     Vital Signs (Most Recent):  Temp: 99.2 °F (37.3 °C) (05/07/18 1600)  Pulse: 66 (05/07/18 1523)  Resp: 15 (05/07/18 1523)  BP: (!) 155/74 (05/07/18 1502)  SpO2: 100 % (05/07/18 1523) Vital Signs (24h Range):  Temp:  [98.5 °F (36.9 °C)-99.9 °F (37.7 °C)] 99.2 °F (37.3 °C)  Pulse:  [53-78] 66  Resp:  [15-49] 15  SpO2:  [97 %-100 %] 100 %  BP: (104-160)/(53-88) 155/74     Weight: 62.3 kg (137 lb 5.6 oz)  Body mass index is 22.86 kg/m².    Intake/Output Summary (Last 24 hours) at 05/07/18 1738  Last data filed at 05/07/18 1600   Gross per 24 hour   Intake             2730 ml   Output              750 ml   Net             1980 ml      Physical Exam   Constitutional: He appears well-developed. No distress.   HENT:   Head: Normocephalic and atraumatic.   ETT in place   Eyes: Pupils are equal, round, and reactive to light.   Cardiovascular: Normal rate and regular rhythm.    Pulmonary/Chest: Effort normal. He has no rales.   On MV   Abdominal: Soft. Bowel sounds are normal. He exhibits no distension.   Musculoskeletal: Normal range of motion. He exhibits no edema.   Neurological:   Gag reflex present  Made eye contact when called by daughter  Positive babinski sign. Decreased DTR to right patella. Downward gaze. Pupils reactive to light but sluggish. No clonus. Does not move extremities when asked to   Skin: He is not diaphoretic.   Nursing note and vitals reviewed.      Significant Labs: All pertinent labs within the past 24 hours have been reviewed.    Significant Imaging: I have reviewed all pertinent imaging results/findings within the past 24 hours.  I have reviewed and interpreted all pertinent imaging results/findings within the past 24 hours.

## 2018-05-07 NOTE — PROGRESS NOTES
[facility] Hospital Medicine  Progress Note    Patient Name: Jigar Michaels  MRN: [id number]  Patient Class: IP- Inpatient   Admission Date: 4/26/2018  Length of Stay: 11 days  Attending Physician: Sosa Holder MD  Primary Care Provider: Hannah Andino MD        Subjective:     Principal Problem:Cerebrovascular accident (CVA) due to stenosis of cerebral artery    HPI:  Jigar Michaels is a 62 y.o. male that (in part)  has a past medical history of Diabetes mellitus; Hypertension; and Stroke.  Presents to [facility] Emergency Department complaining of generalized weakness.  Subacute onset 1 week ago with progressive worsening.  Last known to be normal several days ago.  Was found to be significantly worse this morning by the patient's family member reports significant worsening of the left upper and lower extremity chronic weakness from a previous stroke.  He is unable to communicate effectively and is coughing, which may be consistent with aspiration.  Denies fever, chills, or recent infection.  No recent hospitalizations.  No report of syncope or collapse.  Significantly decreased oral intake over the last few days.  Patient was refusing to come to the Hospital prior to this.  Unfortunately due to the current condition of the patient remained of the history is limited    In the emergency department routine laboratory studies, CT of the head, and a consultation to tele-neurology were performed.  There is concern for acute CVA.  He is being admitted for stroke workup and neurology consultation.  MRI of the morning.    Hospital medicine has been asked to admit for further evaluation and treatment.    Hospital Course:  MRI confirms acute CVA with concern for possible embolic cause. ECHO showed new CHF, therefore, Cardiology consulted.  Neurology consulted. Continue permissive HTN and will likely need rehab for placement. Consulted social service for rehab placement.  Patient  apparently aspirated after being fed some meat by wife on 4/28.  Became more hypoxic and transferred to ICU. Respiratory status continued to worsen requiring endotracheal intubation. Weaned off sedation on 5/2 and remained off sedation while on MV. Only able to make eye contact intermittently, otherwise unable to move extremities when asked to and evidently with babinski sign. These findings are concerning for significant basilar artery involvement. ON 5/5 patient had more coughing spells and making more eye contact when called by his daughter in Mongolian. Fentanyl PRN given for comfort. Wife opted for a DNR code but preferred to proceed with trach and PEG placement. ENT and GI consulted. Plavix held temporarily for these procedures.     Interval History: still making eye contact when called. No coughing today. Renal function worse.     Review of Systems   Unable to perform ROS: Intubated     Objective:     Vital Signs (Most Recent):  Temp: 99.2 °F (37.3 °C) (05/07/18 1600)  Pulse: 66 (05/07/18 1523)  Resp: 15 (05/07/18 1523)  BP: (!) 155/74 (05/07/18 1502)  SpO2: 100 % (05/07/18 1523) Vital Signs (24h Range):  Temp:  [98.5 °F (36.9 °C)-99.9 °F (37.7 °C)] 99.2 °F (37.3 °C)  Pulse:  [53-78] 66  Resp:  [15-49] 15  SpO2:  [97 %-100 %] 100 %  BP: (104-160)/(53-88) 155/74     Weight: 62.3 kg (137 lb 5.6 oz)  Body mass index is 22.86 kg/m².    Intake/Output Summary (Last 24 hours) at 05/07/18 1738  Last data filed at 05/07/18 1600   Gross per 24 hour   Intake             2730 ml   Output              750 ml   Net             1980 ml      Physical Exam   Constitutional: He appears well-developed. No distress.   HENT:   Head: Normocephalic and atraumatic.   ETT in place   Eyes: Pupils are equal, round, and reactive to light.   Cardiovascular: Normal rate and regular rhythm.    Pulmonary/Chest: Effort normal. He has no rales.   On MV   Abdominal: Soft. Bowel sounds are normal. He exhibits no distension.   Musculoskeletal:  Normal range of motion. He exhibits no edema.   Neurological:   Gag reflex present  Made eye contact when called by daughter  Positive babinski sign. Decreased DTR to right patella. Downward gaze. Pupils reactive to light but sluggish. No clonus. Does not move extremities when asked to   Skin: He is not diaphoretic.   Nursing note and vitals reviewed.      Significant Labs: All pertinent labs within the past 24 hours have been reviewed.    Significant Imaging: I have reviewed all pertinent imaging results/findings within the past 24 hours.  I have reviewed and interpreted all pertinent imaging results/findings within the past 24 hours.    Assessment/Plan:      * Cerebrovascular accident (CVA) due to stenosis of cerebral artery    MRI showed acute lacunar-type infarctions involving the superior right terrence and in the left cerebral peduncle as above.  MRA with extensive diffuse cerebrovascular disease with multiple areas of high grade stenosis.  Neurology discussed findings with stroke service at [facility]. Recommended continuing optimized medical therapy  On statin and ASA. Plavix held temporarily for planned trach/PEG. Adjusting insulin for better BG control.    BP stable. Degree of LV systolic dysfunction is a limiting factor. Off vasopressor support  Neurologically, he can only make eye contact and now with gag reflex but no other meaningful recovery appreciated.  Discussed goals of care with family. Interested in Trach and PEG placement but to change code status to DNR. Wife and daughter understand what DNR is. Consulted GI and ENT for evaluation for PEG and trach placement. Clopidogrel on hold since 5/5 as he has to be off of this at least 5 days prior to surgery.             Basilar artery stenosis/occlusion    As seen on MRA. This is a concerning finding and appears to have progressed despite supportive measures. He does have significant stenosis. Neurologist, Pulmonologist and myself discussed with wife, two  sons and daughter end of life measures vs trach/PEG. Family opting for trach and PEG placement as he is making eye contact but no other neuro improvement appreciated. Locked in?          On mechanically assisted ventilation    On day # 8 of vent. Famotidine for stress ulcer proph. Pulm on board for co-management.           Aspiration pneumonia    With low grade fever on moxi. Changed to pip-tazo. On vent support.  On tube feeds. Good gas exchange on minimal vent support          Goals of care, counseling/discussion    As above          Gross hematuria    resolved          Atherosclerotic cerebrovascular disease    Seen on MRA. Likely 2/2 to poorly controlled DM2          Diffuse cerebral atrophy    Seen on MRI. Likely 2/2 chronic microvascular disease.           Cerebellar atrophy    Seen on MRI. Likely 2/2 chronic microvascular disease.           Aspiration into airway    Choked on food being fed to him by wife on 4/28. Declined respiratory wise and now intubated.  Resp Cx not impressive. BCx thus far negative. 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  Cardiology consulted. Planning on ischemic workup with LHC vs NST pending improvement of renal fx  On ASA, BB and statin. Plavix on hold          Acute renal failure    Last Creat we have in record is from 2012.  May have underlying CKD secondary to HTN and DM   Holding diuresis as it is not indicated at this time  Is also on ASA supp 300 mg which patient currently needs  Avoid any other nephrotoxin as much as possible as he would be a poor candidate for RRT if he were to need it  Added free water enteric boluses. Will give once bolus of isotonic fluids today  Mcghee in place. Strict I/Os. Renal panel in AM          Hyperglycemia due to type 2 diabetes mellitus    Adjust insulin therapy for goal BG < 180          CVA, old, hemiparesis    As above          Type  2 diabetes mellitus with hyperglycemia, with long-term current use of insulin    Uncontrolled with hyperglycemia.  Elevated HgA1c to 8.6% despite insulin therapy at home. Dietary non compliance?  BG not at goal. Will adjust long acting insulin. Adjust as needed for goal BG < 180. On tube feeds           Hypertension    Difficult to allow permissive HTN given patient's LV systolic depression to 35% (per TTE), however he may no longer benefit from permissive HTN as he is > 72 hrs post new stroke. Is currently off antihypertensive therapy.           Other dysphagia    Apparent episode of aspiration 4/28. Has significant cerebrovascular disease and multiple strokes. Dysphagia unlikely to resolve. GI consulted for PEG          VTE Risk Mitigation         Ordered     heparin (porcine) injection 5,000 Units  Every 8 hours      05/07/18 1737     IP VTE LOW RISK PATIENT  Once      04/27/18 0248     Place sequential compression device  Until discontinued      04/27/18 0248        Discussed with daughter at bedside    Critical care time spent on the evaluation and treatment of severe organ dysfunction, review of pertinent labs and imaging studies, discussions with consulting providers and discussions with patient/family: > 35 minutes.    Sosa Tellez MD  Department of Hospital Medicine   [facility]

## 2018-05-07 NOTE — PLAN OF CARE
05/07/18 1527   Discharge Reassessment   Assessment Type Discharge Planning Reassessment   Provided patient/caregiver education on the expected discharge date and the discharge plan Yes   Do you have any problems affording any of your prescribed medications? No   Discharge Plan A Long-term acute care facility (LTAC)   Discharge Plan B Other  (to be determined )   Patient choice form signed by patient/caregiver No   patient remains in ICU on vent support. No progress with weaning. SW reviewed chart, discussed with Dr Holder in bed huddle. Met with daughter. Plan for trach, pec on Wed or Thurs. Provided daughter list of area LTAC's from medicare web site. Plan to meet with daughter with wife and MARIA GUADALUPE or live interpretor tomorrow. Spoke with hospital interpretor [facility] [phone] who requests SW call about 10 minutes prior to needing her assistance. SW will continue to follow in ICU and assist as needed.

## 2018-05-07 NOTE — ASSESSMENT & PLAN NOTE
No clinical improvement thus far. Neurology following. Secondary stroke prevention with statins and plavix.  D/w wife and family.  Prognosis is poor.  Family will get back with me in regard to peg/trach.

## 2018-05-07 NOTE — ASSESSMENT & PLAN NOTE
Basal multiple areas of infarction in the posterior circulation.  He has a history of multiple strokes in the past as well without being on appropriate aspirin and statin therapy.  The patient has been overall about the same with regards to cognitive and motor improvement since he was last seen in clinic, the patient does seem to be regarding the examiner more than previous and is able to track across the midline more consistently, which is an improvement compared to his initial presentation.  Continue stenosis in the basilar artery makes the patient a risk for further strokes as well as decompensation if there is extension of his current stroke.  At this point due to poor recovery if patient is scheduled for PEG and tracheostomy once he has been off Plavix for 7 days.  Today is day 2 of 7.

## 2018-05-07 NOTE — ASSESSMENT & PLAN NOTE
Last Creat we have in record is from 2012.  May have underlying CKD secondary to HTN and DM   Holding diuresis as it is not indicated at this time  Is also on ASA supp 300 mg which patient currently needs  Avoid any other nephrotoxin as much as possible as he would be a poor candidate for RRT if he were to need it  Added free water enteric boluses. Will give once bolus of isotonic fluids today  Mcghee in place. Strict I/Os. Renal panel in AM

## 2018-05-07 NOTE — PROGRESS NOTES
[facility]  Pulmonology  Progress Note    Patient Name: Jigar Michaels  MRN: [id number]  Admission Date: 4/26/2018  Hospital Length of Stay: 11 days  Code Status: DNR  Attending Provider: Sosa Holder MD  Primary Care Provider: Hannah Andino MD   Principal Problem: Cerebrovascular accident (CVA) due to stenosis of cerebral artery    Subjective:     HPI:  62 y.o. male that (in part)  has a past medical history of Diabetes mellitus; Hypertension; and Stroke.  Presents to [facility] Emergency Department complaining of generalized weakness.  Subacute onset 1 week ago with progressive worsening.  Last known to be normal several days ago.  Was found to be significantly worse this morning by the patient's family member reports significant worsening of the left upper and lower extremity chronic weakness from a previous stroke.  He is unable to communicate effectively and is coughing, which may be consistent with aspiration.  Denies fever, chills, or recent infection.  No recent hospitalizations.  No report of syncope or collapse.  Significantly decreased oral intake over the last few days.  Patient was refusing to come to the Hospital prior to this.  Unfortunately due to the current condition of the patient remained of the history is limited   Patient with episode of aspiration on the floor. He was brought to the ICU. Last night intubated for respiratory failure.   Pulmonary consulted for ventilator management and ICU co management.     Hospital course:    -4/28/18: Pt with severe coughing when eating.     -4/29/18: Transferred to ICU after episode aspiration while eating.     -4/30/18: Pt is more dysarthric.      -5/1/18: Intubated overnight. Off sedation pt is not responsive.     -5/2/18: Pt moving feet spontaneously but following commands.      -5/3/18: Remains off sedation. Has not been following commands.  Interval History: no acute issue.  No interactive without  sedation    Objective:     Vital Signs (Most Recent):  Temp: 98.5 °F (36.9 °C) (05/07/18 0730)  Pulse: 73 (05/07/18 0839)  Resp: 16 (05/07/18 0839)  BP: (!) 147/65 (05/07/18 0802)  SpO2: 100 % (05/07/18 0839) Vital Signs (24h Range):  Temp:  [98.5 °F (36.9 °C)-99.9 °F (37.7 °C)] 98.5 °F (36.9 °C)  Pulse:  [57-78] 73  Resp:  [15-67] 16  SpO2:  [97 %-100 %] 100 %  BP: (110-160)/(54-88) 147/65     Weight: 62.3 kg (137 lb 5.6 oz)  Body mass index is 22.86 kg/m².      Intake/Output Summary (Last 24 hours) at 05/07/18 1210  Last data filed at 05/07/18 0800   Gross per 24 hour   Intake             1490 ml   Output              730 ml   Net              760 ml       Physical Exam   Constitutional: He appears well-developed. No distress.   HENT:   Head: Normocephalic and atraumatic.   ETT in place   Eyes: Pupils are equal, round, and reactive to light.   Cardiovascular: Normal rate and regular rhythm.    Pulmonary/Chest: Effort normal. He has no rales.   On MV   Abdominal: Soft. Bowel sounds are normal. He exhibits no distension.   Musculoskeletal: Normal range of motion. He exhibits no edema.   Neurological:   Gag reflex present  Not responsive to tactile stimulation   Skin: He is not diaphoretic.   Nursing note and vitals reviewed.      Vents:  Vent Mode: PRVC (05/07/18 1032)  Ventilator Initiated: Yes (05/01/18 0021)  Set Rate: 15 bmp (05/07/18 0504)  Vt Set: 450 mL (05/07/18 0504)  PEEP/CPAP: 5 cmH20 (05/07/18 0504)  Oxygen Concentration (%): 35 (05/07/18 0839)  Peak Airway Pressure: 48.3 cmH2O (05/07/18 0504)  Total Ve: 9.3 mL (05/07/18 0504)  F/VT Ratio<105 (RSBI): (!) 68.64 (05/07/18 3774)    Lines/Drains/Airways     Drain                 Urethral Catheter 04/29/18 1620 16 Fr. 7 days         NG/OG Tube 04/30/18 1705 Left nostril 6 days          Airway                 Airway - Non-Surgical 04/30/18 2357 Endotracheal Tube 6 days          Peripheral Intravenous Line                 Peripheral IV - Single Lumen 05/05/18  0029 Right Wrist 2 days         Peripheral IV - Single Lumen 05/06/18 0200 Right Forearm 1 day                Significant Labs:    CBC/Anemia Profile:  No results for input(s): WBC, HGB, HCT, PLT, MCV, RDW, IRON, FERRITIN, RETIC, FOLATE, FHEBOZDY10, OCCULTBLOOD in the last 48 hours.     Chemistries:    Recent Labs  Lab 05/06/18  0743 05/07/18  0821   * 146*   K 3.8 3.2*   * 113*   CO2 23 26   BUN 57* 60*   CREATININE 2.7* 3.1*   CALCIUM 8.4* 8.4*   ALBUMIN 2.1* 2.1*   PROT  --  6.2   BILITOT  --  0.5   ALKPHOS  --  71   ALT  --  76*   AST  --  65*   PHOS 2.8  --        ABGs:   Recent Labs  Lab 05/07/18  0500   PH 7.478*   PCO2 33.5*   HCO3 24.8   POCSATURATED 98   BE 1       Significant Imaging:  CT: I have reviewed all pertinent results/findings within the past 24 hours and my personal findings are:  head no acute bleed  CXR: I have reviewed all pertinent results/findings within the past 24 hours and my personal findings are:  5/7/18 no effusion or consolidation     MRI 5/1/18 multiple are of infarct in posterior circulation    Assessment/Plan:     * Cerebrovascular accident (CVA) due to stenosis of cerebral artery     No clinical improvement thus far. Neurology following. Secondary stroke prevention with statins and plavix.  D/w wife and family.  Prognosis is poor.  Family will get back with me in regard to peg/trach.         Goals of care, counseling/discussion    Prognosis remained poor due to recurrent cva.  Currently unresponsive on vent without sedation.  Plan is for possible peg/trache.  Family is undecided per my conversation.  DNR.        Acute renal failure    Monitor UOP. BUN/Cr continue to rise.  Diuretic held.           Aspiration pneumonia    WBC normal. Complete 7 day course.   Main barrier to extubation in neuro status.               Mikey Lowery MD  Pulmonology  [facility]

## 2018-05-07 NOTE — PROGRESS NOTES
[facility] [place]  Neurology  Progress Note    Patient Name: Jigar Michaels  MRN: [id number]  Admission Date: 4/26/2018  Hospital Length of Stay: 11 days  Code Status: DNR   Attending Provider: Sosa Holder MD  Primary Care Physician: Hannah Andino MD   Principal Problem:Cerebrovascular accident (CVA) due to stenosis of cerebral artery      Subjective:     Interval History: Interval History: 63 y/o male with medical Hx as listed below comes to ED for weakness on left side. Pt has Hx of stroke with residual left sided weakness but family reports worsening of motor deficits. His wife states that he is able to walk and feed himself at baseline but his speech is slurred. He is adherent to medications according to wife. Pt has already had at least two strokes.     -4/28/18: Pt with severe coughing when eating.     -4/29/18: Transferred to ICU after episode aspiration while eating.     -4/30/18: Pt is more dysarthric.      -5/1/18: Intubated overnight. Off sedation pt is not responsive.     -5/2/18: Pt moving feet spontaneously but following commands.      -5/3/18: Remains off sedation. Has not been following commands.    -5/7/18: Patient still off sedation.  Not following commands.  Patient does track across the midline to verbal command, he has seemingly been more responsive to family members when they speak to the patient [place] citizen rather than his healthcare team.    Current Neurological Medications:     Current Facility-Administered Medications   Medication Dose Route Frequency Provider Last Rate Last Dose    albuterol-ipratropium 2.5mg-0.5mg/3mL nebulizer solution 3 mL  3 mL Nebulization Q4H PRN Alexander Florence MD   3 mL at 05/07/18 0739    aspirin tablet 325 mg  325 mg Per OG tube Daily oSsa Holder MD   325 mg at 05/07/18 0821    atorvastatin tablet 80 mg  80 mg Per OG tube Daily Cristóbal Batista MD   80 mg at 05/07/18 0821    cefTRIAXone injection 1 g  1 g Intravenous Q24H Sosa Holder MD         chlorhexidine 0.12 % solution 15 mL  15 mL Mouth/Throat BID Cristóbal Batista MD   15 mL at 05/07/18 0821    dextrose 50% injection 12.5 g  12.5 g Intravenous PRN Silvano Velasquez MD        famotidine (PF) injection 20 mg  20 mg Intravenous Daily Cristóbal Batista MD   20 mg at 05/07/18 1239    fentaNYL injection 50 mcg  50 mcg Intravenous Q1H PRN Silvano Velasquez MD   50 mcg at 05/07/18 0034    glucagon (human recombinant) injection 1 mg  1 mg Intramuscular PRN Silvano Velasquez MD        insulin aspart U-100 pen 1-10 Units  1-10 Units Subcutaneous Q6H PRN Silvano Velasquez MD   4 Units at 05/07/18 1243    insulin aspart U-100 pen 8 Units  8 Units Subcutaneous Q6H Sosa Holder MD   8 Units at 05/07/18 1243    insulin detemir U-100 pen 25 Units  25 Units Subcutaneous QHS Sosa Holder MD   25 Units at 05/06/18 2000    labetalol injection 10 mg  10 mg Intravenous Q15 Min PRN Silvano Velasquez MD   10 mg at 05/03/18 0236    [START ON 5/8/2018] linezolid 100 mg/5 mL suspension 600 mg  600 mg Per NG tube Q12H Sosa Holder MD        metoprolol tartrate (LOPRESSOR) tablet 25 mg  25 mg Per OG tube BID Sosa Holder MD   25 mg at 05/07/18 0821    mineral oil enema 1 enema  1 enema Rectal Daily PRN Silvano Velasquez MD        ondansetron disintegrating tablet 8 mg  8 mg Oral Q8H PRN Silvano Velasquez MD   8 mg at 04/28/18 0959    polyethylene glycol packet 17 g  17 g Per G Tube BID Sosa Holder MD   17 g at 05/07/18 0819    promethazine suppository 25 mg  25 mg Rectal Q6H PRN Silvano Velasquez MD        senna-docusate 8.6-50 mg per tablet 1 tablet  1 tablet Per OG tube BID Sosa Holder MD   1 tablet at 05/07/18 0821    sodium chloride 0.9% bolus 500 mL  500 mL Intravenous Continuous PRN Silvano Velasquez MD        sodium chloride 0.9% flush 3 mL  3 mL Intravenous Q8H Silvano Velasquez MD   3 mL at 05/07/18 0600       Review of Systems   Unable to  perform ROS: Mental status change     Objective:     Vital Signs (Most Recent):  Temp: 99.2 °F (37.3 °C) (05/07/18 1600)  Pulse: 66 (05/07/18 1523)  Resp: 15 (05/07/18 1523)  BP: (!) 155/74 (05/07/18 1502)  SpO2: 100 % (05/07/18 1523) Vital Signs (24h Range):  Temp:  [98.5 °F (36.9 °C)-99.9 °F (37.7 °C)] 99.2 °F (37.3 °C)  Pulse:  [53-78] 66  Resp:  [15-49] 15  SpO2:  [97 %-100 %] 100 %  BP: (104-160)/(53-88) 155/74     Weight: 62.3 kg (137 lb 5.6 oz)  Body mass index is 22.86 kg/m².    Physical Exam   Constitutional:   Intubated; not sedated   HENT:   Head: Normocephalic and atraumatic.   Eyes: Pupils are equal, round, and reactive to light.   Cardiovascular: Intact distal pulses.    Pulmonary/Chest: Effort normal. No respiratory distress.   Musculoskeletal: Normal range of motion.   Neurological:   Reflex Scores:       Tricep reflexes are 2+ on the right side and 2+ on the left side.       Bicep reflexes are 2+ on the right side and 2+ on the left side.       Brachioradialis reflexes are 2+ on the right side and 2+ on the left side.       Patellar reflexes are 2+ on the right side and 2+ on the left side.       Achilles reflexes are 2+ on the right side and 2+ on the left side.  Skin: Skin is warm and dry.   Psychiatric: He has a normal mood and affect. His behavior is normal.       NEUROLOGICAL EXAMINATION:     MENTAL STATUS        Intubated; tracks across midline     CRANIAL NERVES     CN III, IV, VI   Pupils are equal, round, and reactive to light.  Right pupil: Size: 3 mm. Shape: regular. Reactivity: brisk.   Left pupil: Size: 3 mm. Shape: regular. Reactivity: brisk.   Nystagmus: none   Ophthalmoparesis: none    CN VII   Right facial weakness: none  Left facial weakness: none    MOTOR EXAM   Muscle bulk: normal  Overall muscle tone: normal    REFLEXES     Reflexes   Right brachioradialis: 2+  Left brachioradialis: 2+  Right biceps: 2+  Left biceps: 2+  Right triceps: 2+  Left triceps: 2+  Right patellar:  2+  Left patellar: 2+  Right achilles: 2+  Left achilles: 2+  Right plantar: upgoing  Left plantar: upgoing    GAIT AND COORDINATION     Tremor   Resting tremor: absent      Significant Labs: All pertinent lab results from the past 24 hours have been reviewed.    Significant Imaging: none    Assessment and Plan:     Basilar artery stenosis/occlusion    Basal multiple areas of infarction in the posterior circulation.  He has a history of multiple strokes in the past as well without being on appropriate aspirin and statin therapy.  The patient has been overall about the same with regards to cognitive and motor improvement since he was last seen in clinic, the patient does seem to be regarding the examiner more than previous and is able to track across the midline more consistently, which is an improvement compared to his initial presentation.  Continue stenosis in the basilar artery makes the patient a risk for further strokes as well as decompensation if there is extension of his current stroke.  At this point due to poor recovery if patient is scheduled for PEG and tracheostomy once he has been off Plavix for 7 days.  Today is day 2 of 7.            VTE Risk Mitigation         Ordered     IP VTE LOW RISK PATIENT  Once      04/27/18 0248     Place sequential compression device  Until discontinued      04/27/18 0248          Sukumar Morin MD  Neurology  [facility] [place]

## 2018-05-08 NOTE — CONSULTS
DATE OF CONSULTATION:  05/04/2018    REASON FOR CONSULTATION:  Consideration for tracheotomy.    HISTORY OF PRESENT ILLNESS:  This is a 62-year-old male who had a recent   cerebrovascular accident.  He was admitted to the hospital in late April,   subsequently underwent aspiration after eating some meat with deteriorating   respiratory status and need for emergent endotracheal intubation.  Since that   time, he has been in the Intensive Care Unit and is currently on a ventilator,   only making eye contact, with some concerns of possibly having a locked in CVA.    PHYSICAL EXAMINATION:  VITAL SIGNS:  The patient is afebrile at the current time, his blood pressure is   116/59, respiratory rate is 46.  His pulse is 72.  He is 95% saturated at the   current time.    Physical examination reveals an obtunded Kiswahili male, orally intubated, who   is not responsive to my spoken word.  Examination of the neck reveals the   trachea to be located in the midline.  There are no neck masses.  He is   currently orally intubated, unable to examine his oral cavity.    His current white cell count is 8400, his hemoglobin is 12.0, his hematocrit is   34.7.  He currently has a platelet count 249,000.  I am not able to locate any   clotting studies and I will order those.    We were consulted to see this gentleman to consider tracheotomy.  Apparently, he   is going to be considered for PEG placement as well prior to transfer to rehab.    We will try to schedule the gentleman for tracheotomy early next week and   proceed at that time and place the trach.  In the meantime, I will order his   labs.  His family member was asleep at the time of my visit, I did not try to   wake her and will return to discuss the case early next week.            /richy [id number] anu(s)        KAYLA/ELBERT  dd: 05/04/2018 15:43:11 (CDT)  td: 05/05/2018 02:45:37 (CDT)  Doc ID   #[id number]  Job ID #[id number]    CC:

## 2018-05-08 NOTE — PROGRESS NOTES
Pt received on Servo I vent with settings as followed: PRVC 15/450/+5 and 30%.  Size 7.0 ETT in place and secure at 25 cm at the lip.  Ambu bag and mask at bedside and all alarms on and functioning. NARN. RT will continue to monitor pt status.

## 2018-05-08 NOTE — ASSESSMENT & PLAN NOTE
Apparent episode of aspiration 4/28  Has significant cerebrovascular disease and multiple strokes  Dysphagia unlikely to resolve and GI was consulted for PEG  Extensive discussion on goals of care done today  Family agreed with placing PEG would be futile and will not pursue this  GI notified on change of plan

## 2018-05-08 NOTE — ASSESSMENT & PLAN NOTE
minimal clinical improvement thus far. Neurology following. Secondary stroke prevention with statins and plavix.  D/w wife and family.  Prognosis is poor.  Family will get back with me in regard to peg/trach.  Daughter seems hopeful but undecided about trache.  Decision tree is aggressive care with peg/trache vs comfort care with extubation.  She requested additional time to make decition.  Intubated since 5/1/18.

## 2018-05-08 NOTE — ASSESSMENT & PLAN NOTE
Uncontrolled with hyperglycemia.  Elevated HgA1c to 8.6% despite insulin therapy at home  Will adjust as needed for goal BG < 180 while on tube feedings

## 2018-05-08 NOTE — PLAN OF CARE
Problem: Patient Care Overview  Goal: Plan of Care Review  Outcome: Ongoing (interventions implemented as appropriate)  Pt remains intubated with PRVC   rate 15 PEEP 50 and FIO2 at 30%the patient tolerating TF at 30ml/hr. Pt with draws from pain opens eyes spontaneously but does not follow commands for nurse. Pt now with flexiseal because of several loose stools today. Pt has no skin breakdown noted and has remained safe and free of injury today.

## 2018-05-08 NOTE — EICU
eICU Note : Ventilator Rounds Vent day# 7  Input/Output:+  CXR: Normal  On Mechanical ventilator :settings:   Mode:PRVC      RR 15   PEEP+5  FiO2   PIP  SaO2   Last AB.52/31/115/25.3/  Checked Readiness to wean: P/F ratio 277   F/TV    PEEP +5  Sedation on/off: Fentanyl and     Problem:CVA    Pertinent History and labs reviewed :Pt was transferred from [facility] floor after aspirating . Intubated on  for Aspiration Pneumonia       Treatment /Intervention given:SBT as tolerated in the setting of CVA , tracheostomy being considered.        Teresa Preston M.D  eICU Physician

## 2018-05-08 NOTE — SUBJECTIVE & OBJECTIVE
Subjective:     Interval History: Interval History: 63 y/o male with medical Hx as listed below comes to ED for weakness on left side. Pt has Hx of stroke with residual left sided weakness but family reports worsening of motor deficits. His wife states that he is able to walk and feed himself at baseline but his speech is slurred. He is adherent to medications according to wife. Pt has already had at least two strokes.     -4/28/18: Pt with severe coughing when eating.     -4/29/18: Transferred to ICU after episode aspiration while eating.     -4/30/18: Pt is more dysarthric.      -5/1/18: Intubated overnight. Off sedation pt is not responsive.     -5/2/18: Pt moving feet spontaneously but following commands.      -5/3/18: Remains off sedation. Has not been following commands.    -5/7/18: Patient still off sedation.  Not following commands.  Patient does track across the midline to verbal command, he has seemingly been more responsive to family members when they speak to the patient Malay rather than his healthcare team.    -05/08/18: Family meeting was done today with pulmonary and ICU staff.  Comfort measures are felt to be the patient's best course of action.  He will be extubated today..        Current Neurological Medications:     Current Facility-Administered Medications   Medication Dose Route Frequency Provider Last Rate Last Dose    albuterol-ipratropium 2.5mg-0.5mg/3mL nebulizer solution 3 mL  3 mL Nebulization Q4H PRN Alexander Florence MD   3 mL at 05/08/18 0515    aspirin tablet 325 mg  325 mg Per OG tube Daily Sosa Holder MD   325 mg at 05/08/18 0842    atorvastatin tablet 80 mg  80 mg Per OG tube Daily Cristóbal Batista MD   80 mg at 05/08/18 0842    cefTRIAXone injection 1 g  1 g Intravenous Q24H Sosa Holder MD   1 g at 05/08/18 1602    chlorhexidine 0.12 % solution 15 mL  15 mL Mouth/Throat BID Cristóbal Batista MD   15 mL at 05/08/18 0841    dextrose 50% injection 12.5 g  12.5  g Intravenous PRN Silvano Velasquez MD   12.5 g at 05/08/18 0631    famotidine (PF) injection 20 mg  20 mg Intravenous Daily Cristóbal Batista MD   20 mg at 05/08/18 0842    fentaNYL injection 50 mcg  50 mcg Intravenous Q1H PRN Silvano Velasquez MD   50 mcg at 05/07/18 0034    glucagon (human recombinant) injection 1 mg  1 mg Intramuscular PRN Silvano Velasquez MD        heparin (porcine) injection 5,000 Units  5,000 Units Subcutaneous Q8H Sosa Holder MD   5,000 Units at 05/08/18 1403    insulin aspart U-100 pen 1-10 Units  1-10 Units Subcutaneous Q6H PRN Silvano Velasquez MD   1 Units at 05/08/18 0026    insulin detemir U-100 pen 25 Units  25 Units Subcutaneous QHS Sosa Holder MD   25 Units at 05/07/18 2107    linezolid 100 mg/5 mL suspension 600 mg  600 mg Per NG tube Q12H Sosa Holder MD   600 mg at 05/08/18 0842    metoprolol tartrate (LOPRESSOR) tablet 25 mg  25 mg Per OG tube BID Sosa Holder MD   25 mg at 05/08/18 0842    mineral oil enema 1 enema  1 enema Rectal Daily PRN Silvano Velasquez MD        ondansetron disintegrating tablet 8 mg  8 mg Oral Q8H PRN Silvano Velasquez MD   8 mg at 04/28/18 0959    polyethylene glycol packet 17 g  17 g Per G Tube Daily Sosa Holder MD   17 g at 05/08/18 0842    promethazine suppository 25 mg  25 mg Rectal Q6H PRN Silvano Velasquez MD        senna-docusate 8.6-50 mg per tablet 1 tablet  1 tablet Per OG tube BID Sosa Holder MD   1 tablet at 05/08/18 0842    sodium chloride 0.9% bolus 500 mL  500 mL Intravenous Continuous PRN Silvano Velasquez MD        sodium chloride 0.9% flush 3 mL  3 mL Intravenous Q8H Silvano Velasquez MD   3 mL at 05/08/18 1404       Review of Systems   Unable to perform ROS: Mental status change     Objective:     Vital Signs (Most Recent):  Temp: 98.3 °F (36.8 °C) (05/08/18 1101)  Pulse: 66 (05/08/18 1430)  Resp: 20 (05/08/18 1430)  BP: (!) 150/67 (05/08/18 1400)  SpO2: 100 %  (05/08/18 1430) Vital Signs (24h Range):  Temp:  [98.3 °F (36.8 °C)-100.9 °F (38.3 °C)] 98.3 °F (36.8 °C)  Pulse:  [52-89] 66  Resp:  [8-62] 20  SpO2:  [97 %-100 %] 100 %  BP: ()/(51-74) 150/67     Weight: 62.3 kg (137 lb 5.6 oz)  Body mass index is 22.86 kg/m².    Physical Exam   Constitutional:   Intubated; not sedated   HENT:   Head: Normocephalic and atraumatic.   Eyes: Pupils are equal, round, and reactive to light.   Cardiovascular: Intact distal pulses.    Pulmonary/Chest: Effort normal. No respiratory distress.   Musculoskeletal: Normal range of motion.   Neurological:   Reflex Scores:       Tricep reflexes are 2+ on the right side and 2+ on the left side.       Bicep reflexes are 2+ on the right side and 2+ on the left side.       Brachioradialis reflexes are 2+ on the right side and 2+ on the left side.       Patellar reflexes are 2+ on the right side and 2+ on the left side.       Achilles reflexes are 2+ on the right side and 2+ on the left side.  Skin: Skin is warm and dry.   Psychiatric: He has a normal mood and affect. His behavior is normal.       NEUROLOGICAL EXAMINATION:     MENTAL STATUS        Intubated; tracks across midline     CRANIAL NERVES     CN III, IV, VI   Pupils are equal, round, and reactive to light.  Right pupil: Size: 3 mm. Shape: regular. Reactivity: brisk.   Left pupil: Size: 3 mm. Shape: regular. Reactivity: brisk.   Nystagmus: none   Ophthalmoparesis: none    CN VII   Right facial weakness: none  Left facial weakness: none    MOTOR EXAM   Muscle bulk: normal  Overall muscle tone: normal    REFLEXES     Reflexes   Right brachioradialis: 2+  Left brachioradialis: 2+  Right biceps: 2+  Left biceps: 2+  Right triceps: 2+  Left triceps: 2+  Right patellar: 2+  Left patellar: 2+  Right achilles: 2+  Left achilles: 2+  Right plantar: upgoing  Left plantar: upgoing    GAIT AND COORDINATION     Tremor   Resting tremor: absent      Significant Labs: All pertinent lab results from  the past 24 hours have been reviewed.    Significant Imaging: none

## 2018-05-08 NOTE — PROGRESS NOTES
Discharge planning--extensive meeting today with wife, 2 sons in person, daughter, wife's sisters (4), and son in [place] by phone. Dr Lowery and Dr Summers provided information regarding patient condition, prognosis. Answered many questions with Dr Lowery answering in both Romansh and English for clarity. Family has decided to cancel trach/PEG procedures, will plan to extubate and provide comfort. SW will provided hospice information as plan progresses. SW will follow in ICU and assist as needed.

## 2018-05-08 NOTE — ASSESSMENT & PLAN NOTE
Difficult to allow permissive HTN given patient's LV systolic depression to 35% (per TTE)  No further benefit from permissive HTN as he is > 72 hrs post new stroke  He is currently off antihypertensive therapy but BP in acceptable range

## 2018-05-08 NOTE — ASSESSMENT & PLAN NOTE
MRI showed acute lacunar-type infarctions involving the superior right terrence and in the left cerebral peduncle as above.  MRA with extensive diffuse cerebrovascular disease with multiple areas of high grade stenosis.  Neurology discussed findings with stroke service at main [place]. Recommended continuing optimized medical therapy  On statin and ASA. Plavix held temporarily for planned trach/PEG. Adjusting insulin for better BG control.    BP stable. Degree of LV systolic dysfunction is a limiting factor. Off vasopressor support  Neurologically, he can only make eye contact and now with gag reflex but no other meaningful recovery appreciated.  Discussed goals of care with family by Dr. Tellez  Family was interested in Trach and PEG placement but agree to DNR status  Long discussion done with entire family today with Dr. Lowery and Jaki in attention  Treating team recommended against trach/peg given futility patient undergoing these procedure when patient is unlikely to have any meaningful recovery  Family agreed to not pursue these procedures and are in discussion among themselves on timing of withdrawal of care  This will likely take place tomorrow. Will discuss this with family again tomorrow.

## 2018-05-08 NOTE — ASSESSMENT & PLAN NOTE
Last creatinine we have on record is from 2012.  Likely has underlying CKD secondary to HTN and DM   Holding diuresis as it is not indicated at this time  Avoid nephrotoxin medication as much as possible as he would be a poor candidate for HD  Continue free water enteric boluses  Continue to monitor

## 2018-05-08 NOTE — SIGNIFICANT EVENT
Spoken with wife, children and family.  Dr. Summers was in the room.  All questions answered.  Family is aware of patient's poor prognosis.  Wife and relatives believe that patient is suffering.  In addition, his health has steadily declined over past several years.  Wife even mentioned, that  patient was resistant to hospitalization. Family is in agreement to comfort care.  Plan is to extubate and initiate comfort care.

## 2018-05-08 NOTE — SUBJECTIVE & OBJECTIVE
HPI:  62 y.o. male that (in part)  has a past medical history of Diabetes mellitus; Hypertension; and Stroke.  Presents to [facility] Emergency Department complaining of generalized weakness.  Subacute onset 1 week ago with progressive worsening.  Last known to be normal several days ago.  Was found to be significantly worse this morning by the patient's family member reports significant worsening of the left upper and lower extremity chronic weakness from a previous stroke.  He is unable to communicate effectively and is coughing, which may be consistent with aspiration.  Denies fever, chills, or recent infection.  No recent hospitalizations.  No report of syncope or collapse.  Significantly decreased oral intake over the last few days.  Patient was refusing to come to the Hospital prior to this.  Unfortunately due to the current condition of the patient remained of the history is limited   Patient with episode of aspiration on the floor. He was brought to the ICU. Last night intubated for respiratory failure.   Pulmonary consulted for ventilator management and ICU co management.     Hospital course:    -4/28/18: Pt with severe coughing when eating.     -4/29/18: Transferred to ICU after episode aspiration while eating.     -4/30/18: Pt is more dysarthric.      -5/1/18: Intubated overnight. Off sedation pt is not responsive.     -5/2/18: Pt moving feet spontaneously but following commands.      -5/3/18: Remains off sedation. Has not been following commands.    Interval History: no acute issue.  Per daughter, patient was more interactive yesterday's afternoon.  Watching youtube.      Objective:     Vital Signs (Most Recent):  Temp: 98.6 °F (37 °C) (05/08/18 0701)  Pulse: (!) 59 (05/08/18 0915)  Resp: 17 (05/08/18 0915)  BP: (!) 144/67 (05/08/18 0900)  SpO2: 99 % (05/08/18 0915) Vital Signs (24h Range):  Temp:  [98.6 °F (37 °C)-100.9 °F (38.3 °C)] 98.6 °F (37 °C)  Pulse:  [53-89] 59  Resp:   [8-52] 17  SpO2:  [97 %-100 %] 99 %  BP: (104-174)/(51-88) 144/67     Weight: 62.3 kg (137 lb 5.6 oz)  Body mass index is 22.86 kg/m².      Intake/Output Summary (Last 24 hours) at 05/08/18 0945  Last data filed at 05/08/18 0900   Gross per 24 hour   Intake             2440 ml   Output              778 ml   Net             1662 ml       Physical Exam   Constitutional: He appears well-developed. No distress.   HENT:   Head: Normocephalic and atraumatic.   ETT in place   Eyes: Pupils are equal, round, and reactive to light.   Cardiovascular: Normal rate and regular rhythm.    Pulmonary/Chest: Effort normal. He has no rales.   On MV   Abdominal: Soft. Bowel sounds are normal. He exhibits no distension.   Musculoskeletal: Normal range of motion. He exhibits no edema.   Neurological:   Gag reflex present  Not responsive to tactile stimulation   Skin: He is not diaphoretic.   Nursing note and vitals reviewed.      Vents:  Vent Mode: PRVC (05/08/18 0750)  Ventilator Initiated: Yes (05/01/18 0021)  Set Rate: 15 bmp (05/08/18 0750)  Vt Set: 450 mL (05/08/18 0750)  PEEP/CPAP: 5 cmH20 (05/08/18 0750)  Oxygen Concentration (%): 30 (05/08/18 0915)  Peak Airway Pressure: 19.9 cmH2O (05/08/18 0750)  Total Ve: 6.7 mL (05/08/18 0750)  F/VT Ratio<105 (RSBI): (!) 36.78 (05/08/18 0509)    Lines/Drains/Airways     Drain                 Urethral Catheter 04/29/18 1620 16 Fr. 8 days         NG/OG Tube 05/08/18 0500 nasogastric 16 Fr. Left nostril less than 1 day          Airway                 Airway - Non-Surgical 04/30/18 2357 Endotracheal Tube 7 days          Peripheral Intravenous Line                 Peripheral IV - Single Lumen 05/05/18 0029 Right Wrist 3 days         Peripheral IV - Single Lumen 05/06/18 0200 Right Forearm 2 days                Significant Labs:    CBC/Anemia Profile:    Recent Labs  Lab 05/08/18  0357   WBC 11.29   HGB 11.0*   HCT 33.2*      MCV 85   RDW 13.4        Chemistries:    Recent Labs  Lab  05/07/18  0821 05/08/18  0357 05/08/18  0648   * 145  145  --    K 3.2* 2.9*  2.9*  --    * 114*  114*  --    CO2 26 23  23  --    BUN 60* 54*  54*  --    CREATININE 3.1* 2.9*  2.9*  --    CALCIUM 8.4* 8.3*  8.3*  --    ALBUMIN 2.1* 2.0*  2.0*  --    PROT 6.2 5.9*  5.9*  --    BILITOT 0.5 0.3  0.3  --    ALKPHOS 71 66  66  --    ALT 76* 73*  73*  --    AST 65* 57*  57*  --    MG  --   --  2.6       ABGs:     Recent Labs  Lab 05/08/18  0519   PH 7.491*   PCO2 34.0*   HCO3 26.0   POCSATURATED 100   BE 3       Significant Imaging:  CT: I have reviewed all pertinent results/findings within the past 24 hours and my personal findings are:  head no acute bleed  CXR: I have reviewed all pertinent results/findings within the past 24 hours and my personal findings are:  5/7/18 no effusion or consolidation     MRI 5/1/18 multiple are of infarct in posterior circulation

## 2018-05-08 NOTE — PROGRESS NOTES
[facility]  Neurology  Progress Note    Patient Name: Jigar Michaels  MRN: [id number]  Admission Date: 4/26/2018  Hospital Length of Stay: 12 days  Code Status: DNR   Attending Provider: Dorina Summers MD  Primary Care Physician: Hannah Andino MD   Principal Problem:Cerebrovascular accident (CVA) due to stenosis of cerebral artery      Subjective:     Interval History: Interval History: 63 y/o male with medical Hx as listed below comes to ED for weakness on left side. Pt has Hx of stroke with residual left sided weakness but family reports worsening of motor deficits. His wife states that he is able to walk and feed himself at baseline but his speech is slurred. He is adherent to medications according to wife. Pt has already had at least two strokes.     -4/28/18: Pt with severe coughing when eating.     -4/29/18: Transferred to ICU after episode aspiration while eating.     -4/30/18: Pt is more dysarthric.      -5/1/18: Intubated overnight. Off sedation pt is not responsive.     -5/2/18: Pt moving feet spontaneously but following commands.      -5/3/18: Remains off sedation. Has not been following commands.    -5/7/18: Patient still off sedation.  Not following commands.  Patient does track across the midline to verbal command, he has seemingly been more responsive to family members when they speak to the patient Turkmen rather than his healthcare team.    -05/08/18: Family meeting was done today with pulmonary and ICU staff.  Comfort measures are felt to be the patient's best course of action.  He will be extubated today..        Current Neurological Medications:     Current Facility-Administered Medications   Medication Dose Route Frequency Provider Last Rate Last Dose    albuterol-ipratropium 2.5mg-0.5mg/3mL nebulizer solution 3 mL  3 mL Nebulization Q4H PRN Alexander Florence MD   3 mL at 05/08/18 0515    aspirin tablet 325 mg  325 mg Per OG tube Daily Sosa Holder MD   325 mg at 05/08/18  0842    atorvastatin tablet 80 mg  80 mg Per OG tube Daily Cristóbal Batista MD   80 mg at 05/08/18 0842    cefTRIAXone injection 1 g  1 g Intravenous Q24H Sosa Holder MD   1 g at 05/08/18 1602    chlorhexidine 0.12 % solution 15 mL  15 mL Mouth/Throat BID Cristóbal Batista MD   15 mL at 05/08/18 0841    dextrose 50% injection 12.5 g  12.5 g Intravenous PRN Silvano Velasquez MD   12.5 g at 05/08/18 0631    famotidine (PF) injection 20 mg  20 mg Intravenous Daily Cristóbal Batista MD   20 mg at 05/08/18 0842    fentaNYL injection 50 mcg  50 mcg Intravenous Q1H PRN Silvano Velasquez MD   50 mcg at 05/07/18 0034    glucagon (human recombinant) injection 1 mg  1 mg Intramuscular PRN Silvano Velasquez MD        heparin (porcine) injection 5,000 Units  5,000 Units Subcutaneous Q8H Sosa Holder MD   5,000 Units at 05/08/18 1403    insulin aspart U-100 pen 1-10 Units  1-10 Units Subcutaneous Q6H PRN Silvano Velasquez MD   1 Units at 05/08/18 0026    insulin detemir U-100 pen 25 Units  25 Units Subcutaneous QHS Sosa Holder MD   25 Units at 05/07/18 2107    linezolid 100 mg/5 mL suspension 600 mg  600 mg Per NG tube Q12H Sosa Holder MD   600 mg at 05/08/18 0842    metoprolol tartrate (LOPRESSOR) tablet 25 mg  25 mg Per OG tube BID Sosa Holder MD   25 mg at 05/08/18 0842    mineral oil enema 1 enema  1 enema Rectal Daily PRN Silvano Velasquez MD        ondansetron disintegrating tablet 8 mg  8 mg Oral Q8H PRN Silvano Velasquez MD   8 mg at 04/28/18 0959    polyethylene glycol packet 17 g  17 g Per G Tube Daily Sosa Holder MD   17 g at 05/08/18 0842    promethazine suppository 25 mg  25 mg Rectal Q6H PRN Silvano Velasquez MD        senna-docusate 8.6-50 mg per tablet 1 tablet  1 tablet Per OG tube BID Sosa Holder MD   1 tablet at 05/08/18 0842    sodium chloride 0.9% bolus 500 mL  500 mL Intravenous Continuous PRN Silvano Velasquez MD        sodium  chloride 0.9% flush 3 mL  3 mL Intravenous Q8H Silvano Velasquez MD   3 mL at 05/08/18 1404       Review of Systems   Unable to perform ROS: Mental status change     Objective:     Vital Signs (Most Recent):  Temp: 98.3 °F (36.8 °C) (05/08/18 1101)  Pulse: 66 (05/08/18 1430)  Resp: 20 (05/08/18 1430)  BP: (!) 150/67 (05/08/18 1400)  SpO2: 100 % (05/08/18 1430) Vital Signs (24h Range):  Temp:  [98.3 °F (36.8 °C)-100.9 °F (38.3 °C)] 98.3 °F (36.8 °C)  Pulse:  [52-89] 66  Resp:  [8-62] 20  SpO2:  [97 %-100 %] 100 %  BP: ()/(51-74) 150/67     Weight: 62.3 kg (137 lb 5.6 oz)  Body mass index is 22.86 kg/m².    Physical Exam   Constitutional:   Intubated; not sedated   HENT:   Head: Normocephalic and atraumatic.   Eyes: Pupils are equal, round, and reactive to light.   Cardiovascular: Intact distal pulses.    Pulmonary/Chest: Effort normal. No respiratory distress.   Musculoskeletal: Normal range of motion.   Neurological:   Reflex Scores:       Tricep reflexes are 2+ on the right side and 2+ on the left side.       Bicep reflexes are 2+ on the right side and 2+ on the left side.       Brachioradialis reflexes are 2+ on the right side and 2+ on the left side.       Patellar reflexes are 2+ on the right side and 2+ on the left side.       Achilles reflexes are 2+ on the right side and 2+ on the left side.  Skin: Skin is warm and dry.   Psychiatric: He has a normal mood and affect. His behavior is normal.       NEUROLOGICAL EXAMINATION:     MENTAL STATUS        Intubated; tracks across midline     CRANIAL NERVES     CN III, IV, VI   Pupils are equal, round, and reactive to light.  Right pupil: Size: 3 mm. Shape: regular. Reactivity: brisk.   Left pupil: Size: 3 mm. Shape: regular. Reactivity: brisk.   Nystagmus: none   Ophthalmoparesis: none    CN VII   Right facial weakness: none  Left facial weakness: none    MOTOR EXAM   Muscle bulk: normal  Overall muscle tone: normal    REFLEXES     Reflexes   Right  brachioradialis: 2+  Left brachioradialis: 2+  Right biceps: 2+  Left biceps: 2+  Right triceps: 2+  Left triceps: 2+  Right patellar: 2+  Left patellar: 2+  Right achilles: 2+  Left achilles: 2+  Right plantar: upgoing  Left plantar: upgoing    GAIT AND COORDINATION     Tremor   Resting tremor: absent      Significant Labs: All pertinent lab results from the past 24 hours have been reviewed.    Significant Imaging: none    Assessment and Plan:     Basilar artery stenosis/occlusion    Basal multiple areas of infarction in the posterior circulation.  He has a history of multiple strokes in the past as well without being on appropriate aspirin and statin therapy.  Continued stenosis in the basilar artery makes the patient a risk for further strokes as well as decompensation if there is extension of his current stroke.  The patient is a very poor prognosis of any meaningful recovery.  Family has agreed to pursue comfort care.            VTE Risk Mitigation         Ordered     heparin (porcine) injection 5,000 Units  Every 8 hours      05/07/18 1737     IP VTE LOW RISK PATIENT  Once      04/27/18 0248     Place sequential compression device  Until discontinued      04/27/18 0248        Critical care time 30 minutes    Sukumar Morin MD  Neurology  [facility]

## 2018-05-08 NOTE — ASSESSMENT & PLAN NOTE
Extensive family conference done today  Family agreed to abort pursing trach/peg  Will plan on withdrawal of care and comfort measure soon  Family just need more time to finalize decision

## 2018-05-08 NOTE — PROGRESS NOTES
" [facility]  Adult Nutrition  Progress Note    SUMMARY       Recommendations    Recommendation/Intervention:   1. Will reassess TF depending on plan  - may need to adjust TF formula and rate for LT maintenance needs vs. Acute vented needs   -current TF adequate at this time.   2. Rec MVI as low volume of TF inadequate to meet DRI's for micronutrients (renal MVI as appropriate).  3. RD to monitor    Goals: 1) Initiate nutrition support with 24 - 48 hours  Nutrition Goal Status: goal met  Communication of RD Recs: reviewed with RN    D/C planning: Too soon to determine    Reason for Assessment    Reason for Assessment: RD follow-up  Diagnosis: other (see comments) (Acute CVA, ARF)  Relevant Medical History: DM, Stroke, HTN  Interdisciplinary Rounds: did not attend    General Information Comments: Family deciding on trache/PEG.   Nutrition Discharge Planning: Too soon to determine. Will monitor/follow-up.     Nutrition Risk Screen    Nutrition Risk Screen: no indicators present    Nutrition/Diet History    Food Preferences: JEN Taoist food preferences.   Do you have any cultural, spiritual, Taoist conflicts, given your current situation?: JEN cultural, Taoist or ethnic food preferences  Factors Affecting Nutritional Intake: difficulty/impaired swallowing, NPO, on mechanical ventilation    Anthropometrics    Temp: 98.6 °F (37 °C)  Height Method: Measured  Height: 5' 5" (165.1 cm)  Height (inches): 65 in  Weight Method: Bed Scale  Weight: 62.3 kg (137 lb 5.6 oz)  Weight (lb): 137.35 lb  Ideal Body Weight (IBW), Male: 136 lb  % Ideal Body Weight, Male (lb): 99.37 lb  BMI (Calculated): 22.5  BMI Grade: 18.5-24.9 - normal       Lab/Procedures/Meds    Pertinent Labs Reviewed: reviewed  Pertinent Labs Comments: GFR 22  Pertinent Medications Reviewed: reviewed  Pertinent Medications Comments: statin, insulin    Physical Findings/Assessment    Overall Physical Appearance: edematous, nourished, on " ventilator support (trace edema 1+)  Tubes: orogastric tube  Oral/Mouth Cavity: WDL  Skin: edema, intact (Luigi Score 12)    Estimated/Assessed Needs    Weight Used For Calorie Calculations: 62.3 kg (137 lb 5.6 oz)  Energy Calorie Requirements (kcal): 1580 kcal  Energy Need Method: Cecilio State (1585)  Protein Requirements: 75g  Weight Used For Protein Calculations: 61.3 kg (135 lb 2.3 oz)     Fluid Need Method: RDA Method  RDA Method (mL): 1580  CHO Requirement: 180g      Nutrition Prescription Ordered    Current Diet Order: NPO  Current Nutrition Support Formula Ordered: Novasource Renal  Current Nutrition Support Rate Ordered: 30 (ml)  Current Nutrition Support Frequency Ordered: ml/hr  Oral Nutrition Supplement: beneprotein 1/day    Evaluation of Received Nutrient/Fluid Intake    Enteral Calories (kcal): 1465  Enteral Protein (gm): 71  Enteral (Free Water) Fluid (mL): 516  Other Calories (kcal): 103 (propofol)  % Kcal Needs: 93%  % Protein Needs: 95%  IV Fluid (mL): 1800  I/O: 1137/2125  Energy Calories Required: meeting needs  Protein Required: meeting needs  Fluid Required:  (per MD)  Comments: LBM: 5/8  Tolerance: tolerating      Nutrition Risk    Level of Risk/Frequency of Follow-up:  (f/u 2x weekly)     Assessment and Plan    Nutrition Dx: Inadequate Energy Intake r/t mechanical ventilation as evidenced by NPO  Nutrition Dx Status       Monitor and Evaluation    Food and Nutrient Intake: energy intake, enteral nutrition intake  Food and Nutrient Adminstration: enteral and parenteral nutrition administration  Knowledge/Beliefs/Attitudes: food and nutrition knowledge/skill, beliefs and attitudes  Physical Activity and Function: nutrition-related ADLs and IADLs  Anthropometric Measurements: weight, weight change, body mass index  Biochemical Data, Medical Tests and Procedures: electrolyte and renal panel, gastrointestinal profile, glucose/endocrine profile, lipid profile  Nutrition-Focused Physical Findings:  overall appearance     Nutrition Follow-Up    RD Follow-up?: Yes

## 2018-05-08 NOTE — ASSESSMENT & PLAN NOTE
Choked on food being fed to him by wife on 4/28  Declined respiratory wise and now intubated  Resp Cx not impressive. BCx thus far negative  Continue empiric abx treatment for now

## 2018-05-08 NOTE — ASSESSMENT & PLAN NOTE
+dysphagia prior to intubation.  WBC normal. Complete 7 day course.   Main barrier to extubation in neuro status.

## 2018-05-08 NOTE — ASSESSMENT & PLAN NOTE
Echo showing EF of 35% with diastolic dysfunction.  Cardiology consulted.   On ASA, BB and statin. Plavix was on hold  Cardiology planned on ischemic workup with LHC vs NST pending improvement of renal fx but this is not indicated any more given patient unlikely to have any meaningful recovery

## 2018-05-08 NOTE — SUBJECTIVE & OBJECTIVE
Interval History: Pt able to make eye contact and track but otherwise cannot engage with the external environment. No change in neurological status. No acute events.    Review of Systems   Unable to perform ROS: Intubated     Objective:     Vital Signs (Most Recent):  Temp: 98.3 °F (36.8 °C) (05/08/18 1101)  Pulse: 64 (05/08/18 1815)  Resp: (!) 37 (05/08/18 1815)  BP: (!) 151/68 (05/08/18 1800)  SpO2: 100 % (05/08/18 1815) Vital Signs (24h Range):  Temp:  [98.3 °F (36.8 °C)-100.9 °F (38.3 °C)] 98.3 °F (36.8 °C)  Pulse:  [52-89] 64  Resp:  [8-62] 37  SpO2:  [97 %-100 %] 100 %  BP: ()/(51-71) 151/68     Weight: 62.3 kg (137 lb 5.6 oz)  Body mass index is 22.86 kg/m².    Intake/Output Summary (Last 24 hours) at 05/08/18 1832  Last data filed at 05/08/18 1800   Gross per 24 hour   Intake             1470 ml   Output              941 ml   Net              529 ml      Physical Exam   Constitutional: He appears well-developed. No distress.   Chronically ill appearing   HENT:   Head: Normocephalic and atraumatic.   ETT in place   Eyes: Pupils are equal, round, and reactive to light.   Cardiovascular: Normal rate and regular rhythm.    Pulmonary/Chest: Effort normal. He has no rales.   Ventilated breath sounds   Abdominal: Soft. Bowel sounds are normal. He exhibits no distension.   Musculoskeletal: Normal range of motion. He exhibits no edema.   Neurological:   Gag reflex present, tracks with eyes, + babinski sign; pupils reactive to light but sluggish. No clonus. Cannot follow commands   Skin: He is not diaphoretic.   Nursing note and vitals reviewed.      Significant Labs: All pertinent labs within the past 24 hours have been reviewed.    Significant Imaging: I have reviewed all pertinent imaging results/findings within the past 24 hours.  I have reviewed and interpreted all pertinent imaging results/findings within the past 24 hours.

## 2018-05-08 NOTE — PROGRESS NOTES
[facility] Hospital Medicine  Progress Note    Patient Name: Jigar Michaels  MRN: [id number]  Patient Class: IP- Inpatient   Admission Date: 4/26/2018  Length of Stay: 12 days  Attending Physician: Dorina Summers MD  Primary Care Provider: Hannah Andino MD        Subjective:     Principal Problem:Cerebrovascular accident (CVA) due to stenosis of cerebral artery    HPI:  Jigar Michaels is a 62 y.o. male that (in part)  has a past medical history of Diabetes mellitus; Hypertension; and Stroke.  Presents to [facility] Emergency Department complaining of generalized weakness.  Subacute onset 1 week ago with progressive worsening.  Last known to be normal several days ago.  Was found to be significantly worse this morning by the patient's family member reports significant worsening of the left upper and lower extremity chronic weakness from a previous stroke.  He is unable to communicate effectively and is coughing, which may be consistent with aspiration.  Denies fever, chills, or recent infection.  No recent hospitalizations.  No report of syncope or collapse.  Significantly decreased oral intake over the last few days.  Patient was refusing to come to the Hospital prior to this.  Unfortunately due to the current condition of the patient remained of the history is limited    In the emergency department routine laboratory studies, CT of the head, and a consultation to tele-neurology were performed.  There is concern for acute CVA.  He is being admitted for stroke workup and neurology consultation.  MRI of the morning.    Hospital medicine has been asked to admit for further evaluation and treatment.    Hospital Course:  Mr. Michaels was admitted with an acute CVA confirmed by MRI with concern for possible embolic cause. ECHO showed new CHF, therefore, Cardiology consulted.  Neurology consulted. Pt treated with permissive HTN, ASA, statin and was being considered for rehab placement.  Patient  aspirated after being fed some meat by wife on 4/28.  He became more hypoxic and transferred to ICU. Respiratory status continued to worsen and required endotracheal intubation. He was weaned off all sedation on 5/2 and remained off all sedation while on the ventilator. Pt only able to make eye contact intermittently, otherwise unable to move extremities when asked to and with babinski sign. Repeat MRI of the brain showed progression of his stroke. These clinical findings were concerning for significant basilar artery involvement. On 5/5, patient had more coughing spells and making more eye contact when called by his daughter in Serbian. Fentanyl PRN given for comfort. However, patient without any significant neurological improvement and prognosis poor for any meaningful recovery. Wife agreed to DNR status, but reportedly wanted to proceed with trach and PEG placement. ENT and GI consulted. Plavix held temporarily in anticipation for these procedures.     Interval History: Pt able to make eye contact and track but otherwise cannot engage with the external environment. No change in neurological status. No acute events.    Review of Systems   Unable to perform ROS: Intubated     Objective:     Vital Signs (Most Recent):  Temp: 98.3 °F (36.8 °C) (05/08/18 1101)  Pulse: 64 (05/08/18 1815)  Resp: (!) 37 (05/08/18 1815)  BP: (!) 151/68 (05/08/18 1800)  SpO2: 100 % (05/08/18 1815) Vital Signs (24h Range):  Temp:  [98.3 °F (36.8 °C)-100.9 °F (38.3 °C)] 98.3 °F (36.8 °C)  Pulse:  [52-89] 64  Resp:  [8-62] 37  SpO2:  [97 %-100 %] 100 %  BP: ()/(51-71) 151/68     Weight: 62.3 kg (137 lb 5.6 oz)  Body mass index is 22.86 kg/m².    Intake/Output Summary (Last 24 hours) at 05/08/18 1832  Last data filed at 05/08/18 1800   Gross per 24 hour   Intake             1470 ml   Output              941 ml   Net              529 ml      Physical Exam   Constitutional: He appears well-developed. No distress.   Chronically ill  appearing   HENT:   Head: Normocephalic and atraumatic.   ETT in place   Eyes: Pupils are equal, round, and reactive to light.   Cardiovascular: Normal rate and regular rhythm.    Pulmonary/Chest: Effort normal. He has no rales.   Ventilated breath sounds   Abdominal: Soft. Bowel sounds are normal. He exhibits no distension.   Musculoskeletal: Normal range of motion. He exhibits no edema.   Neurological:   Gag reflex present, tracks with eyes, + babinski sign; pupils reactive to light but sluggish. No clonus. Cannot follow commands   Skin: He is not diaphoretic.   Nursing note and vitals reviewed.      Significant Labs: All pertinent labs within the past 24 hours have been reviewed.    Significant Imaging: I have reviewed all pertinent imaging results/findings within the past 24 hours.  I have reviewed and interpreted all pertinent imaging results/findings within the past 24 hours.    Assessment/Plan:      * Cerebrovascular accident (CVA) due to stenosis of cerebral artery    MRI showed acute lacunar-type infarctions involving the superior right terrence and in the left cerebral peduncle as above.  MRA with extensive diffuse cerebrovascular disease with multiple areas of high grade stenosis.  Neurology discussed findings with stroke service at [facility]. Recommended continuing optimized medical therapy  On statin and ASA. Plavix held temporarily for planned trach/PEG. Adjusting insulin for better BG control.    BP stable. Degree of LV systolic dysfunction is a limiting factor. Off vasopressor support  Neurologically, he can only make eye contact and now with gag reflex but no other meaningful recovery appreciated.  Discussed goals of care with family by Dr. Tellez  Family was interested in Trach and PEG placement but agree to DNR status  Long discussion done with entire family today with Dr. Lowery and Jaki in attention  Treating team recommended against trach/peg given futility patient undergoing these procedure when  patient is unlikely to have any meaningful recovery  Family agreed to not pursue these procedures and are in discussion among themselves on timing of withdrawal of care  This will likely take place tomorrow. Will discuss this with family again tomorrow.        Goals of care, counseling/discussion    Extensive family conference done today  Family agreed to abort pursing trach/peg  Will plan on withdrawal of care and comfort measure soon  Family just need more time to finalize decision        Gross hematuria    Resolved        Basilar artery stenosis/occlusion    As seen on MRA  This is a concerning finding and appears to have progressed despite supportive measures  He does have significant stenosis  Pulmonologist and myself discussed with wife, two sons and daughter end of life measures  Will plan on withdrawal of care soon, waiting on family concerning timing        On mechanically assisted ventilation    On day # 9 of vent, as per Pulmonary  Famotidine for stress ulcer prophylaxis        Atherosclerotic cerebrovascular disease    Seen on MRA. Likely secondary to poorly controlled DM2 and HTN        Diffuse cerebral atrophy    Seen on MRI  Likely 2/2 chronic microvascular disease        Cerebellar atrophy    Seen on MRI  Likely 2/2 chronic microvascular disease        Aspiration into airway    Choked on food being fed to him by wife on 4/28  Declined respiratory wise and now intubated  Resp Cx not impressive. 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  Cardiology consulted.   On ASA, BB and statin. Plavix was on hold  Cardiology planned on ischemic workup with C vs NST pending improvement of renal fx but this is not indicated any more given patient unlikely to have any meaningful recovery        Acute renal failure    Last creatinine we have on record is from 2012.  Likely has  underlying CKD secondary to HTN and DM   Holding diuresis as it is not indicated at this time  Avoid nephrotoxin medication as much as possible as he would be a poor candidate for HD  Continue free water enteric boluses  Continue to monitor        Hyperglycemia due to type 2 diabetes mellitus    Adjust insulin therapy for goal BG < 180        Aspiration pneumonia    With low grade fever on Avelox   Changed to Zosyn and on vent support  Will continue current care        CVA, old, hemiparesis    As discussed above        Type 2 diabetes mellitus with hyperglycemia, with long-term current use of insulin    Uncontrolled with hyperglycemia.  Elevated HgA1c to 8.6% despite insulin therapy at home  Will adjust as needed for goal BG < 180 while on tube feedings        Hypertension    Difficult to allow permissive HTN given patient's LV systolic depression to 35% (per TTE)  No further benefit from permissive HTN as he is > 72 hrs post new stroke  He is currently off antihypertensive therapy but BP in acceptable range        Other dysphagia    Apparent episode of aspiration 4/28  Has significant cerebrovascular disease and multiple strokes  Dysphagia unlikely to resolve and GI was consulted for PEG  Extensive discussion on goals of care done today  Family agreed with placing PEG would be futile and will not pursue this  GI notified on change of plan          VTE Risk Mitigation         Ordered     heparin (porcine) injection 5,000 Units  Every 8 hours      05/07/18 1737     IP VTE LOW RISK PATIENT  Once      04/27/18 0248     Place sequential compression device  Until discontinued      04/27/18 0248          Critical care time spent on the evaluation and treatment of severe organ dysfunction, review of pertinent labs and imaging studies, discussions with consulting providers and discussions with patient/family: 120 minutes.    Dorina Summers MD  Department of Hospital Medicine   [facility]

## 2018-05-08 NOTE — PLAN OF CARE
Problem: Patient Care Overview  Goal: Plan of Care Review  Outcome: Ongoing (interventions implemented as appropriate)  Pt remains on ventilator; PRVC/30%/450/15/+5. Not following commands. Opens eyes and coughs frequently. NSR on the monitor. T-Max 100.9. Two loose BM's this shift. UO adequate. Family at bedside throughout shift and updated on plan of care.

## 2018-05-08 NOTE — PROGRESS NOTES
[facility]  Pulmonology  Progress Note    Patient Name: Jigar Michaels  MRN: [id number]  Admission Date: 4/26/2018  Hospital Length of Stay: 12 days  Code Status: DNR  Attending Provider: Dorina Summers MD  Primary Care Provider: Hannah Andino MD   Principal Problem: Cerebrovascular accident (CVA) due to stenosis of cerebral artery    Subjective:     HPI:  62 y.o. male that (in part)  has a past medical history of Diabetes mellitus; Hypertension; and Stroke.  Presents to [facility] Emergency Department complaining of generalized weakness.  Subacute onset 1 week ago with progressive worsening.  Last known to be normal several days ago.  Was found to be significantly worse this morning by the patient's family member reports significant worsening of the left upper and lower extremity chronic weakness from a previous stroke.  He is unable to communicate effectively and is coughing, which may be consistent with aspiration.  Denies fever, chills, or recent infection.  No recent hospitalizations.  No report of syncope or collapse.  Significantly decreased oral intake over the last few days.  Patient was refusing to come to the Hospital prior to this.  Unfortunately due to the current condition of the patient remained of the history is limited   Patient with episode of aspiration on the floor. He was brought to the ICU. Last night intubated for respiratory failure.   Pulmonary consulted for ventilator management and ICU co management.     Hospital course:    -4/28/18: Pt with severe coughing when eating.     -4/29/18: Transferred to ICU after episode aspiration while eating.     -4/30/18: Pt is more dysarthric.      -5/1/18: Intubated overnight. Off sedation pt is not responsive.     -5/2/18: Pt moving feet spontaneously but following commands.      -5/3/18: Remains off sedation. Has not been following commands.    Interval History: no acute issue.  Per daughter, patient was more  interactive yesterday's afternoon.  Watching youtube.      Objective:     Vital Signs (Most Recent):  Temp: 98.6 °F (37 °C) (05/08/18 0701)  Pulse: (!) 59 (05/08/18 0915)  Resp: 17 (05/08/18 0915)  BP: (!) 144/67 (05/08/18 0900)  SpO2: 99 % (05/08/18 0915) Vital Signs (24h Range):  Temp:  [98.6 °F (37 °C)-100.9 °F (38.3 °C)] 98.6 °F (37 °C)  Pulse:  [53-89] 59  Resp:  [8-52] 17  SpO2:  [97 %-100 %] 99 %  BP: (104-174)/(51-88) 144/67     Weight: 62.3 kg (137 lb 5.6 oz)  Body mass index is 22.86 kg/m².      Intake/Output Summary (Last 24 hours) at 05/08/18 0945  Last data filed at 05/08/18 0900   Gross per 24 hour   Intake             2440 ml   Output              778 ml   Net             1662 ml       Physical Exam   Constitutional: He appears well-developed. No distress.   HENT:   Head: Normocephalic and atraumatic.   ETT in place   Eyes: Pupils are equal, round, and reactive to light.   Cardiovascular: Normal rate and regular rhythm.    Pulmonary/Chest: Effort normal. He has no rales.   On MV   Abdominal: Soft. Bowel sounds are normal. He exhibits no distension.   Musculoskeletal: Normal range of motion. He exhibits no edema.   Neurological:   Gag reflex present  Not responsive to tactile stimulation   Skin: He is not diaphoretic.   Nursing note and vitals reviewed.      Vents:  Vent Mode: PRVC (05/08/18 0750)  Ventilator Initiated: Yes (05/01/18 0021)  Set Rate: 15 bmp (05/08/18 0750)  Vt Set: 450 mL (05/08/18 0750)  PEEP/CPAP: 5 cmH20 (05/08/18 0750)  Oxygen Concentration (%): 30 (05/08/18 0915)  Peak Airway Pressure: 19.9 cmH2O (05/08/18 0750)  Total Ve: 6.7 mL (05/08/18 0750)  F/VT Ratio<105 (RSBI): (!) 36.78 (05/08/18 0509)    Lines/Drains/Airways     Drain                 Urethral Catheter 04/29/18 1620 16 Fr. 8 days         NG/OG Tube 05/08/18 0500 nasogastric 16 Fr. Left nostril less than 1 day          Airway                 Airway - Non-Surgical 04/30/18 2357 Endotracheal Tube 7 days          Peripheral  Intravenous Line                 Peripheral IV - Single Lumen 05/05/18 0029 Right Wrist 3 days         Peripheral IV - Single Lumen 05/06/18 0200 Right Forearm 2 days                Significant Labs:    CBC/Anemia Profile:    Recent Labs  Lab 05/08/18  0357   WBC 11.29   HGB 11.0*   HCT 33.2*      MCV 85   RDW 13.4        Chemistries:    Recent Labs  Lab 05/07/18  0821 05/08/18  0357 05/08/18  0648   * 145  145  --    K 3.2* 2.9*  2.9*  --    * 114*  114*  --    CO2 26 23  23  --    BUN 60* 54*  54*  --    CREATININE 3.1* 2.9*  2.9*  --    CALCIUM 8.4* 8.3*  8.3*  --    ALBUMIN 2.1* 2.0*  2.0*  --    PROT 6.2 5.9*  5.9*  --    BILITOT 0.5 0.3  0.3  --    ALKPHOS 71 66  66  --    ALT 76* 73*  73*  --    AST 65* 57*  57*  --    MG  --   --  2.6       ABGs:     Recent Labs  Lab 05/08/18  0519   PH 7.491*   PCO2 34.0*   HCO3 26.0   POCSATURATED 100   BE 3       Significant Imaging:  CT: I have reviewed all pertinent results/findings within the past 24 hours and my personal findings are:  head no acute bleed  CXR: I have reviewed all pertinent results/findings within the past 24 hours and my personal findings are:  5/7/18 no effusion or consolidation     MRI 5/1/18 multiple are of infarct in posterior circulation    Assessment/Plan:     * Cerebrovascular accident (CVA) due to stenosis of cerebral artery    minimal clinical improvement thus far. Neurology following. Secondary stroke prevention with statins and plavix.  D/w wife and family.  Prognosis is poor.  Family will get back with me in regard to peg/trach.  Daughter seems hopeful but undecided about trache.  Decision tree is aggressive care with peg/trache vs comfort care with extubation.  She requested additional time to make decition.  Intubated since 5/1/18.          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    EF 30s.  I/O's negative overnight. Would try to keep even.  ?[facility] once renal function improved.         Acute renal failure    Monitor UOP. BUN/Cr improving.  Diuretic held.           Aspiration pneumonia    +dysphagia prior to intubation.  WBC normal. Complete 7 day course.   Main barrier to extubation in neuro status.               Mikey Lowery MD  Pulmonology  [facility]

## 2018-05-08 NOTE — ASSESSMENT & PLAN NOTE
Basal multiple areas of infarction in the posterior circulation.  He has a history of multiple strokes in the past as well without being on appropriate aspirin and statin therapy.  Continued stenosis in the basilar artery makes the patient a risk for further strokes as well as decompensation if there is extension of his current stroke.  The patient is a very poor prognosis of any meaningful recovery.  Family has agreed to pursue comfort care.

## 2018-05-09 PROBLEM — Z51.5 COMFORT MEASURES ONLY STATUS: Status: ACTIVE | Noted: 2018-01-01

## 2018-05-09 NOTE — PROGRESS NOTES
Discharge planning--family meeting again today as noted by Dr Lowery. SW arranged Swedish  Dell Gutierrez per family request. She periodically provided translation however most of conversation, wife stated not necessary. Wife, 3 children and 5 in-laws present. Daughter inquired regarding hospice. SW provided very brief explanation after the doctors left that reinforced the information provided by the doctors. SW will continue to follow, assist as needed.

## 2018-05-09 NOTE — PLAN OF CARE
Problem: Patient Care Overview  Goal: Plan of Care Review  Patient received on servoi ventilator with 7.0 ETT secured at 24cm to right side of lip. All ventilator alarms on, set and functioning. ambu bag and mask at bedside. PRN aerosol treatment given. Will continue with ventilator management

## 2018-05-09 NOTE — CARE UPDATE
Cross-Cover Progress Note    I was called to mr. Jigar Michaels's bedside by his nurse to evaluate him for possible ETT malpositioning.  He was noted to have vigorous coughing today and was later noted to have his ETT dislodged.  He was maintaining matched VTi and VTe.  A STAT portable X-ray was significant for a high-riding ETT.  Respiratory attempted to reposition the ETT but it was coiling in the posterior oropharynx.  I attempted to reposition the ETT via Bougie introducer with some success, though it is still higher than ideal.    Given that the plan for him is to withdraw soon, and that is is maintaining SpO2, and that is inspiratory and expiratory volumes are matching, I elected to leave the ETT at it's current position.  He tolerated the repositioning without complications.    I will continue monitor the patient's progress throughout the night.          Critical Care Time: 45 minutes.          Laura Chang M.D.  [facility]  Department of Hospital Medicine  [facility]  Pager: [phone]

## 2018-05-09 NOTE — SUBJECTIVE & OBJECTIVE
HPI:  62 y.o. male that (in part)  has a past medical history of Diabetes mellitus; Hypertension; and Stroke.  Presents to [facility] Emergency Department complaining of generalized weakness.  Subacute onset 1 week ago with progressive worsening.  Last known to be normal several days ago.  Was found to be significantly worse this morning by the patient's family member reports significant worsening of the left upper and lower extremity chronic weakness from a previous stroke.  He is unable to communicate effectively and is coughing, which may be consistent with aspiration.  Denies fever, chills, or recent infection.  No recent hospitalizations.  No report of syncope or collapse.  Significantly decreased oral intake over the last few days.  Patient was refusing to come to the Hospital prior to this.  Unfortunately due to the current condition of the patient remained of the history is limited   Patient with episode of aspiration on the floor. He was brought to the ICU. Last night intubated for respiratory failure.   Pulmonary consulted for ventilator management and ICU co management.     Hospital course:    -4/28/18: Pt with severe coughing when eating.     -4/29/18: Transferred to ICU after episode aspiration while eating.     -4/30/18: Pt is more dysarthric.      -5/1/18: Intubated overnight. Off sedation pt is not responsive.     -5/2/18: Pt moving feet spontaneously but following commands.      -5/3/18: Remains off sedation. Has not been following commands.    -5/8/18 family meeting with family agreed to pursuing comfort care    Interval History: no acute issue.  ETT malposition overnight.    Objective:     Vital Signs (Most Recent):  Temp: 98.5 °F (36.9 °C) (05/09/18 0701)  Pulse: 70 (05/09/18 1045)  Resp: (!) 25 (05/09/18 1045)  BP: (!) 109/53 (05/09/18 1000)  SpO2: 99 % (05/09/18 1045) Vital Signs (24h Range):  Temp:  [97.9 °F (36.6 °C)-98.6 °F (37 °C)] 98.5 °F (36.9 °C)  Pulse:  [50-89]  70  Resp:  [0-62] 25  SpO2:  [99 %-100 %] 99 %  BP: ()/(48-83) 109/53     Weight: 62.3 kg (137 lb 5.6 oz)  Body mass index is 22.86 kg/m².      Intake/Output Summary (Last 24 hours) at 05/09/18 1109  Last data filed at 05/09/18 1000   Gross per 24 hour   Intake             1550 ml   Output              894 ml   Net              656 ml       Physical Exam   Constitutional: He appears well-developed. No distress.   HENT:   Head: Normocephalic and atraumatic.   ETT in place   Eyes: Pupils are equal, round, and reactive to light.   Cardiovascular: Normal rate and regular rhythm.    Pulmonary/Chest: Effort normal. He has no rales.   On MV   Abdominal: Soft. Bowel sounds are normal. He exhibits no distension.   Musculoskeletal: Normal range of motion. He exhibits no edema.   Neurological:   Gag reflex present  Not responsive to tactile stimulation   Skin: He is not diaphoretic.   Nursing note and vitals reviewed.      Vents:  Vent Mode: PRVC (05/09/18 1005)  Ventilator Initiated: Yes (05/01/18 0021)  Set Rate: 15 bmp (05/09/18 1005)  Vt Set: 450 mL (05/09/18 1005)  PEEP/CPAP: 5 cmH20 (05/09/18 1005)  Oxygen Concentration (%): 30 (05/09/18 1045)  Peak Airway Pressure: 19.4 cmH2O (05/09/18 1005)  Total Ve: 6.3 mL (05/09/18 1005)  F/VT Ratio<105 (RSBI): (!) 32.82 (05/09/18 0504)    Lines/Drains/Airways     Drain                 Urethral Catheter 04/29/18 1620 16 Fr. 9 days         NG/OG Tube 05/08/18 0500 nasogastric 16 Fr. Left nostril 1 day         Rectal Tube 05/08/18 1215 fecal management system less than 1 day          Airway                 Airway - Non-Surgical 04/30/18 2357 Endotracheal Tube 8 days          Peripheral Intravenous Line                 Peripheral IV - Single Lumen 05/09/18 0315 Right;Posterior Forearm less than 1 day                Significant Labs:    CBC/Anemia Profile:    Recent Labs  Lab 05/08/18  0357   WBC 11.29   HGB 11.0*   HCT 33.2*      MCV 85   RDW 13.4         Chemistries:    Recent Labs  Lab 05/08/18  0357 05/08/18  0648 05/09/18  0215     145  --  146*   K 2.9*  2.9*  --  3.2*   *  114*  --  114*   CO2 23  23  --  21*   BUN 54*  54*  --  51*   CREATININE 2.9*  2.9*  --  2.8*   CALCIUM 8.3*  8.3*  --  8.6*   ALBUMIN 2.0*  2.0*  --  2.0*   PROT 5.9*  5.9*  --  6.2   BILITOT 0.3  0.3  --  0.3   ALKPHOS 66  66  --  69   ALT 73*  73*  --  75*   AST 57*  57*  --  60*   MG  --  2.6  --        ABGs:     Recent Labs  Lab 05/09/18  0505   PH 7.491*   PCO2 32.0*   HCO3 24.5   POCSATURATED 99   BE 1       Significant Imaging:  CT: I have reviewed all pertinent results/findings within the past 24 hours and my personal findings are:  head no acute bleed  CXR: I have reviewed all pertinent results/findings within the past 24 hours and my personal findings are:  5/7/18 no effusion or consolidation     MRI 5/1/18 multiple are of infarct in posterior circulation

## 2018-05-09 NOTE — PROGRESS NOTES
05/09/18 0729   Patient Assessment/Suction   Level of Consciousness (AVPU) responds to voice   Respiratory Effort Normal;Unlabored   Expansion/Accessory Muscles/Retractions expansion symmetric   All Lung Fields Breath Sounds clear;diminished   Rhythm/Pattern, Respiratory ventilator assisted   Cough Frequency infrequent   Cough Type good;nonproductive   Suction Method in-line suction catheter (closed);endotracheal tube   PRE-TX-O2-ETCO2   O2 Device (Oxygen Therapy) ventilator   $ Is the patient on Low Flow Oxygen? Yes   Oxygen Concentration (%) 30   Pulse Oximetry Type Continuous   $ Pulse Oximetry - Multiple Charge Pulse Oximetry - Multiple       Airway - Non-Surgical 04/30/18 2357 Endotracheal Tube   Placement Date/Time: 04/30/18 2357   Method of Intubation: Bougie Intubation  Inserted by: (c) Anesthesia MD  Airway Device: Endotracheal Tube  Blade: Twyla #3  Airway Device Size: 7.0  Style: Cuffed  Placement Verified By: Auscultation  Findings ...   Secured at 24 cm   Measured At Lips   Secured Location Left  (Moved from center to left.)   Secured by Commercial tube morris   Bite Block none   Site Condition Dry   Status Intact;Secured;Patent   Site Assessment Clean;Dry   Cuff Pressure 25 cm H2O   Equipment Change   $ RT Equipment HME   Vent Select   Conventional Vent Y   $ Ventilator Subsequent 1   Charged w/in last 24h YES   Preset Conventional Ventilator Settings   Vent ID [id number]   Vent Type Servo i   Vent Mode PRVC   Humidity HME   Set Rate 15 bmp   Vt Set 450 mL   PEEP/CPAP 5 cmH20   Insp Time 1 Sec(s)   Insp Rise Time  0.3 %   Patient Ventilator Parameters   Resp Rate Total 15 br/min   Peak Airway Pressure 19.2 cmH2O   Mean Airway Pressure 8.1 cmH20   Exhaled Vt 447 mL   Total Ve 6.7 mL   Conventional Ventilator Alarms   Alarms On Y   Ve High Alarm 35 L/min   Ve Low Alarm 3 L/min   Resp Rate High Alarm 45 br/min   Resp Rate Low Alarm 5   Ready to Wean/Extubation Screen   FIO2<=50 (chart decimal) 0.3   MV<16L  (chart vol.) 6.7   PEEP <=8 (chart #) 5

## 2018-05-09 NOTE — SIGNIFICANT EVENT
Spoken with family.  Dr. Summers and , Jaki were present.  Family in in agreement with extubation and initiation of comfort care today.

## 2018-05-09 NOTE — PLAN OF CARE
Problem: Patient Care Overview  Goal: Plan of Care Review  Lavaged and suction patient while off ventilator with 100% oxygen

## 2018-05-09 NOTE — ASSESSMENT & PLAN NOTE
MRI showed acute lacunar-type infarctions involving the superior right terrence and in the left cerebral peduncle as above.  MRA with extensive diffuse cerebrovascular disease with multiple areas of high grade stenosis.  Neurology discussed findings with stroke service at main [place]. Recommended continuing optimized medical therapy  On statin and ASA. Plavix held temporarily for planned trach/PEG. Adjusting insulin for better BG control.    BP stable. Degree of LV systolic dysfunction is a limiting factor. Off vasopressor support  Neurologically, he can only make eye contact and now with gag reflex but no other meaningful recovery appreciated.  Discussed goals of care with family by Dr. Tellez  Family was interested in Trach and PEG placement but agree to DNR status  Long discussion done with entire family today with Dr. Lowery and Jaki in attention  Treating team recommended against trach/peg given futility patient undergoing these procedure when patient is unlikely to have any meaningful recovery  Family agreed to not pursue these procedures and are in discussion among themselves on timing of withdrawal of care  Will discuss with family today about withdrawal of care

## 2018-05-09 NOTE — PROGRESS NOTES
[facility]  Pulmonology  Progress Note    Patient Name: Jigar Michaels  MRN: [id number]  Admission Date: 4/26/2018  Hospital Length of Stay: 13 days  Code Status: DNR  Attending Provider: Dorina Summers MD  Primary Care Provider: Hannah Andino MD   Principal Problem: Cerebrovascular accident (CVA) due to stenosis of cerebral artery    Subjective:     HPI:  62 y.o. male that (in part)  has a past medical history of Diabetes mellitus; Hypertension; and Stroke.  Presents to [facility] Emergency Department complaining of generalized weakness.  Subacute onset 1 week ago with progressive worsening.  Last known to be normal several days ago.  Was found to be significantly worse this morning by the patient's family member reports significant worsening of the left upper and lower extremity chronic weakness from a previous stroke.  He is unable to communicate effectively and is coughing, which may be consistent with aspiration.  Denies fever, chills, or recent infection.  No recent hospitalizations.  No report of syncope or collapse.  Significantly decreased oral intake over the last few days.  Patient was refusing to come to the Hospital prior to this.  Unfortunately due to the current condition of the patient remained of the history is limited   Patient with episode of aspiration on the floor. He was brought to the ICU. Last night intubated for respiratory failure.   Pulmonary consulted for ventilator management and ICU co management.     Hospital course:    -4/28/18: Pt with severe coughing when eating.     -4/29/18: Transferred to ICU after episode aspiration while eating.     -4/30/18: Pt is more dysarthric.      -5/1/18: Intubated overnight. Off sedation pt is not responsive.     -5/2/18: Pt moving feet spontaneously but following commands.      -5/3/18: Remains off sedation. Has not been following commands.    -5/8/18 family meeting with family agreed to pursuing comfort  care    Interval History: no acute issue.  ETT malposition overnight.    Objective:     Vital Signs (Most Recent):  Temp: 98.5 °F (36.9 °C) (05/09/18 0701)  Pulse: 70 (05/09/18 1045)  Resp: (!) 25 (05/09/18 1045)  BP: (!) 109/53 (05/09/18 1000)  SpO2: 99 % (05/09/18 1045) Vital Signs (24h Range):  Temp:  [97.9 °F (36.6 °C)-98.6 °F (37 °C)] 98.5 °F (36.9 °C)  Pulse:  [50-89] 70  Resp:  [0-62] 25  SpO2:  [99 %-100 %] 99 %  BP: ()/(48-83) 109/53     Weight: 62.3 kg (137 lb 5.6 oz)  Body mass index is 22.86 kg/m².      Intake/Output Summary (Last 24 hours) at 05/09/18 1109  Last data filed at 05/09/18 1000   Gross per 24 hour   Intake             1550 ml   Output              894 ml   Net              656 ml       Physical Exam   Constitutional: He appears well-developed. No distress.   HENT:   Head: Normocephalic and atraumatic.   ETT in place   Eyes: Pupils are equal, round, and reactive to light.   Cardiovascular: Normal rate and regular rhythm.    Pulmonary/Chest: Effort normal. He has no rales.   On MV   Abdominal: Soft. Bowel sounds are normal. He exhibits no distension.   Musculoskeletal: Normal range of motion. He exhibits no edema.   Neurological:   Gag reflex present  Not responsive to tactile stimulation   Skin: He is not diaphoretic.   Nursing note and vitals reviewed.      Vents:  Vent Mode: PRVC (05/09/18 1005)  Ventilator Initiated: Yes (05/01/18 0021)  Set Rate: 15 bmp (05/09/18 1005)  Vt Set: 450 mL (05/09/18 1005)  PEEP/CPAP: 5 cmH20 (05/09/18 1005)  Oxygen Concentration (%): 30 (05/09/18 1045)  Peak Airway Pressure: 19.4 cmH2O (05/09/18 1005)  Total Ve: 6.3 mL (05/09/18 1005)  F/VT Ratio<105 (RSBI): (!) 32.82 (05/09/18 0504)    Lines/Drains/Airways     Drain                 Urethral Catheter 04/29/18 1620 16 Fr. 9 days         NG/OG Tube 05/08/18 0500 nasogastric 16 Fr. Left nostril 1 day         Rectal Tube 05/08/18 1215 fecal management system less than 1 day          Airway                  Airway - Non-Surgical 04/30/18 2357 Endotracheal Tube 8 days          Peripheral Intravenous Line                 Peripheral IV - Single Lumen 05/09/18 0315 Right;Posterior Forearm less than 1 day                Significant Labs:    CBC/Anemia Profile:    Recent Labs  Lab 05/08/18  0357   WBC 11.29   HGB 11.0*   HCT 33.2*      MCV 85   RDW 13.4        Chemistries:    Recent Labs  Lab 05/08/18  0357 05/08/18  0648 05/09/18  0215     145  --  146*   K 2.9*  2.9*  --  3.2*   *  114*  --  114*   CO2 23  23  --  21*   BUN 54*  54*  --  51*   CREATININE 2.9*  2.9*  --  2.8*   CALCIUM 8.3*  8.3*  --  8.6*   ALBUMIN 2.0*  2.0*  --  2.0*   PROT 5.9*  5.9*  --  6.2   BILITOT 0.3  0.3  --  0.3   ALKPHOS 66  66  --  69   ALT 73*  73*  --  75*   AST 57*  57*  --  60*   MG  --  2.6  --        ABGs:     Recent Labs  Lab 05/09/18  0505   PH 7.491*   PCO2 32.0*   HCO3 24.5   POCSATURATED 99   BE 1       Significant Imaging:  CT: I have reviewed all pertinent results/findings within the past 24 hours and my personal findings are:  head no acute bleed  CXR: I have reviewed all pertinent results/findings within the past 24 hours and my personal findings are:  5/7/18 no effusion or consolidation     MRI 5/1/18 multiple are of infarct in posterior circulation    Assessment/Plan:     * Cerebrovascular accident (CVA) due to stenosis of cerebral artery    minimal clinical improvement thus far. Neurology following. Secondary stroke prevention with statins and plavix.   Intubated since 5/1/18.  Length conversation with wife and relative.  Family agreed with futility of care.  Will not persue peg/trach.  Await family in term of timing of withdrawal.          Aspiration pneumonia    +dysphagia prior to intubation.  WBC normal. Complete 7 days course.   Main barrier to extubation in neuro status.  Await family inputs in regard to time of extubation.                 Mikey Lowery MD  Pulmonology  [facility]

## 2018-05-09 NOTE — SIGNIFICANT EVENT
ETT dislodged to 18cm. Was able to only Adjust ETT to 21cm without difficulty. Patient maintaining adequate tidal volumes. sp02 100%. Dr. Chang notified. Chest x-ray ordered.

## 2018-05-09 NOTE — PROGRESS NOTES
[facility] Hospital Medicine  Progress Note    Patient Name: Jigar Michaels  MRN: [id number]  Patient Class: IP- Inpatient   Admission Date: 4/26/2018  Length of Stay: 13 days  Attending Physician: Dorina Summers MD  Primary Care Provider: Hannah Andino MD        Subjective:     Principal Problem:Cerebrovascular accident (CVA) due to stenosis of cerebral artery    HPI:  Jigar Michaels is a 62 y.o. male that (in part)  has a past medical history of Diabetes mellitus; Hypertension; and Stroke.  Presents to [facility] Emergency Department complaining of generalized weakness.  Subacute onset 1 week ago with progressive worsening.  Last known to be normal several days ago.  Was found to be significantly worse this morning by the patient's family member reports significant worsening of the left upper and lower extremity chronic weakness from a previous stroke.  He is unable to communicate effectively and is coughing, which may be consistent with aspiration.  Denies fever, chills, or recent infection.  No recent hospitalizations.  No report of syncope or collapse.  Significantly decreased oral intake over the last few days.  Patient was refusing to come to the Hospital prior to this.  Unfortunately due to the current condition of the patient remained of the history is limited    In the emergency department routine laboratory studies, CT of the head, and a consultation to tele-neurology were performed.  There is concern for acute CVA.  He is being admitted for stroke workup and neurology consultation.  MRI of the morning.    Hospital medicine has been asked to admit for further evaluation and treatment.    Hospital Course:  Mr. Michaels was admitted with an acute CVA confirmed by MRI with concern for possible embolic cause. ECHO showed new CHF, therefore, Cardiology consulted.  Neurology consulted. Pt treated with permissive HTN, ASA, statin and was being considered for rehab placement.  Patient  aspirated after being fed some meat by wife on 4/28.  He became more hypoxic and transferred to ICU. Respiratory status continued to worsen and required endotracheal intubation. He was weaned off all sedation on 5/2 and remained off all sedation while on the ventilator. Pt only able to make eye contact intermittently, otherwise unable to move extremities when asked to and with babinski sign. Repeat MRI of the brain showed progression of his stroke. These clinical findings were concerning for significant basilar artery involvement. On 5/5, patient had more coughing spells and making more eye contact when called by his daughter in [place]. Fentanyl PRN given for comfort. However, patient without any significant neurological improvement and prognosis poor for any meaningful recovery. Wife agreed to DNR status, but reportedly wanted to proceed with trach and PEG placement. ENT and GI consulted. Plavix held temporarily in anticipation for these procedures. Family conference done on 5/8 and family seemed to be in agreement with withdrawal of care but wanted more time to finalize plan.    Interval History: Pt able to make eye contact and track but otherwise cannot engage with the external environment. No change in neurological status. No acute events.    Review of Systems   Unable to perform ROS: Intubated     Objective:     Vital Signs (Most Recent):  Temp: 98.5 °F (36.9 °C) (05/09/18 0701)  Pulse: (!) 58 (05/09/18 0915)  Resp: (!) 32 (05/09/18 0915)  BP: (!) 145/66 (05/09/18 0900)  SpO2: 99 % (05/09/18 0915) Vital Signs (24h Range):  Temp:  [97.9 °F (36.6 °C)-98.6 °F (37 °C)] 98.5 °F (36.9 °C)  Pulse:  [52-89] 58  Resp:  [0-62] 32  SpO2:  [99 %-100 %] 99 %  BP: ()/(48-83) 145/66     Weight: 62.3 kg (137 lb 5.6 oz)  Body mass index is 22.86 kg/m².    Intake/Output Summary (Last 24 hours) at 05/09/18 1047  Last data filed at 05/09/18 0900   Gross per 24 hour   Intake             1550 ml   Output              890  ml   Net              660 ml      Physical Exam   Constitutional: He appears well-developed. No distress.   Chronically ill appearing   HENT:   Head: Normocephalic and atraumatic.   ETT in place   Eyes: Pupils are equal, round, and reactive to light.   Cardiovascular: Normal rate and regular rhythm.    Pulmonary/Chest: Effort normal. He has no rales.   Ventilated breath sounds   Abdominal: Soft. Bowel sounds are normal. He exhibits no distension.   Musculoskeletal: Normal range of motion. He exhibits no edema.   Neurological:   Gag reflex present, tracks with eyes, + babinski sign; pupils reactive to light but sluggish. No clonus. Cannot follow commands   Skin: He is not diaphoretic.   Nursing note and vitals reviewed.      Significant Labs: All pertinent labs within the past 24 hours have been reviewed.    Significant Imaging: I have reviewed all pertinent imaging results/findings within the past 24 hours.  I have reviewed and interpreted all pertinent imaging results/findings within the past 24 hours.    Assessment/Plan:      * Cerebrovascular accident (CVA) due to stenosis of cerebral artery    MRI showed acute lacunar-type infarctions involving the superior right terrence and in the left cerebral peduncle as above.  MRA with extensive diffuse cerebrovascular disease with multiple areas of high grade stenosis.  Neurology discussed findings with stroke service at [facility]. Recommended continuing optimized medical therapy  On statin and ASA. Plavix held temporarily for planned trach/PEG. Adjusting insulin for better BG control.    BP stable. Degree of LV systolic dysfunction is a limiting factor. Off vasopressor support  Neurologically, he can only make eye contact and now with gag reflex but no other meaningful recovery appreciated.  Discussed goals of care with family by Dr. Tellez  Family was interested in Trach and PEG placement but agree to DNR status  Long discussion done with entire family today with Dr. Lowery  and Jaki in attention  Treating team recommended against trach/peg given futility patient undergoing these procedure when patient is unlikely to have any meaningful recovery  Family agreed to not pursue these procedures and are in discussion among themselves on timing of withdrawal of care  Will discuss with family today about withdrawal of care        Goals of care, counseling/discussion    Extensive family conference done today  Family agreed to abort pursing trach/peg  Will plan on withdrawal of care and comfort measure soon  Family just need more time to finalize decision        Gross hematuria    Resolved        Basilar artery stenosis/occlusion    As seen on MRA  This is a concerning finding and appears to have progressed despite supportive measures  He does have significant stenosis  Pulmonologist and myself discussed with wife, two sons and daughter end of life measures  Will plan on withdrawal of care soon, waiting on family concerning timing        On mechanically assisted ventilation    On day # 9 of vent, as per Pulmonary  Famotidine for stress ulcer prophylaxis        Atherosclerotic cerebrovascular disease    Seen on MRA. Likely secondary to poorly controlled DM2 and HTN        Diffuse cerebral atrophy    Seen on MRI  Likely 2/2 chronic microvascular disease        Cerebellar atrophy    Seen on MRI  Likely 2/2 chronic microvascular disease        Aspiration into airway    Choked on food being fed to him by wife on 4/28  Declined respiratory wise and now intubated  Resp Cx not impressive. 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  Cardiology consulted.   On ASA, BB and statin. Plavix was on hold  Cardiology planned on ischemic workup with LHC vs NST pending improvement of renal fx but this is not indicated any more given patient unlikely to have any meaningful  recovery        Acute renal failure    Last creatinine we have on record is from 2012.  Likely has underlying CKD secondary to HTN and DM   Holding diuresis as it is not indicated at this time  Avoid nephrotoxin medication as much as possible as he would be a poor candidate for HD  Continue free water enteric boluses  Continue to monitor        Hyperglycemia due to type 2 diabetes mellitus    Adjust insulin therapy for goal BG < 180        Aspiration pneumonia    With low grade fever on Avelox   Changed to Zosyn and on vent support  Will continue current care        CVA, old, hemiparesis    As discussed above        Type 2 diabetes mellitus with hyperglycemia, with long-term current use of insulin    Uncontrolled with hyperglycemia.  Elevated HgA1c to 8.6% despite insulin therapy at home  Will adjust as needed for goal BG < 180 while on tube feedings        Hypertension    Difficult to allow permissive HTN given patient's LV systolic depression to 35% (per TTE)  No further benefit from permissive HTN as he is > 72 hrs post new stroke  He is currently off antihypertensive therapy but BP in acceptable range        Other dysphagia    Apparent episode of aspiration 4/28  Has significant cerebrovascular disease and multiple strokes  Dysphagia unlikely to resolve and GI was consulted for PEG  Extensive discussion on goals of care done today  Family agreed with placing PEG would be futile and will not pursue this  GI notified on change of plan          VTE Risk Mitigation         Ordered     heparin (porcine) injection 5,000 Units  Every 8 hours      05/07/18 1737     IP VTE LOW RISK PATIENT  Once      04/27/18 0248     Place sequential compression device  Until discontinued      04/27/18 0248          Critical care time spent on the evaluation and treatment of severe organ dysfunction, review of pertinent labs and imaging studies, discussions with consulting providers and discussions with patient/family: 35  minutes.    Dorina Summers MD  Department of Hospital Medicine   [facility]

## 2018-05-09 NOTE — SUBJECTIVE & OBJECTIVE
Interval History: Pt able to make eye contact and track but otherwise cannot engage with the external environment. No change in neurological status. No acute events.    Review of Systems   Unable to perform ROS: Intubated     Objective:     Vital Signs (Most Recent):  Temp: 98.5 °F (36.9 °C) (05/09/18 0701)  Pulse: (!) 58 (05/09/18 0915)  Resp: (!) 32 (05/09/18 0915)  BP: (!) 145/66 (05/09/18 0900)  SpO2: 99 % (05/09/18 0915) Vital Signs (24h Range):  Temp:  [97.9 °F (36.6 °C)-98.6 °F (37 °C)] 98.5 °F (36.9 °C)  Pulse:  [52-89] 58  Resp:  [0-62] 32  SpO2:  [99 %-100 %] 99 %  BP: ()/(48-83) 145/66     Weight: 62.3 kg (137 lb 5.6 oz)  Body mass index is 22.86 kg/m².    Intake/Output Summary (Last 24 hours) at 05/09/18 1047  Last data filed at 05/09/18 0900   Gross per 24 hour   Intake             1550 ml   Output              890 ml   Net              660 ml      Physical Exam   Constitutional: He appears well-developed. No distress.   Chronically ill appearing   HENT:   Head: Normocephalic and atraumatic.   ETT in place   Eyes: Pupils are equal, round, and reactive to light.   Cardiovascular: Normal rate and regular rhythm.    Pulmonary/Chest: Effort normal. He has no rales.   Ventilated breath sounds   Abdominal: Soft. Bowel sounds are normal. He exhibits no distension.   Musculoskeletal: Normal range of motion. He exhibits no edema.   Neurological:   Gag reflex present, tracks with eyes, + babinski sign; pupils reactive to light but sluggish. No clonus. Cannot follow commands   Skin: He is not diaphoretic.   Nursing note and vitals reviewed.      Significant Labs: All pertinent labs within the past 24 hours have been reviewed.    Significant Imaging: I have reviewed all pertinent imaging results/findings within the past 24 hours.  I have reviewed and interpreted all pertinent imaging results/findings within the past 24 hours.

## 2018-05-09 NOTE — ASSESSMENT & PLAN NOTE
+dysphagia prior to intubation.  WBC normal. Complete 7 days course.   Main barrier to extubation in neuro status.  Await family inputs in regard to time of extubation.

## 2018-05-09 NOTE — ASSESSMENT & PLAN NOTE
minimal clinical improvement thus far. Neurology following. Secondary stroke prevention with statins and plavix.   Intubated since 5/1/18.  Length conversation with wife and relative.  Family agreed with futility of care.  Will not persue peg/trach.  Await family in term of timing of withdrawal.

## 2018-05-09 NOTE — PLAN OF CARE
Problem: Patient Care Overview  Goal: Plan of Care Review  Outcome: Ongoing (interventions implemented as appropriate)  Pt was extubated today per family wishes and now is comfort only. Pt started on morphine drip 1mg/hr and also has morphine ordered q 15 minutes as needed. Pt receiving ativan also prn. Pt on O2 via nasal cannula. Family at bedside.

## 2018-05-09 NOTE — UM SECONDARY REVIEW
[facility] Medical Affairs    IP Extended Stay > 10     63 YO male w/ CVA. Not much meaningful ecovery. Pt still on vent. Family agrees w/ withdrawal of care , but wants more time to finalize plan      LOS: approved w/o recommendations due to severity of clinical picture     Approved per UM list

## 2018-05-09 NOTE — PROGRESS NOTES
NGT d/c'd. Pt extubated and now on comfort care only per family wishes. Pt appears comfortable after ativan and morphine given.

## 2018-05-10 NOTE — PLAN OF CARE
05/10/18 0739   Final Note   Assessment Type Final Discharge Note   Discharge Disposition    chart reviewed

## 2018-05-10 NOTE — NURSING
Pt noted to be asystole on monitor, MD called to bedside to examine patient. Pt pronounced at 0534. Emotional support given to family at bedside.

## 2018-05-10 NOTE — SIGNIFICANT EVENT
Death Pronouncement Note    I was called to Mr. Jigar Michaels's bedside by his nurse to pronounce his death.    My examination revealed absent heart and breath sounds.  There were no carotid and radial pulses.  His pupils were fixed and dilated.  Patient was unresponsive to noxious stimuli.    I pronounced him dead at 5:34 AM on Thursday, May 10, 2018.  Family was at the bedside.  I will inform his attending physician.        Total time spent on case: 10 minutes.          Laura Chang M.D.  Staff Nocturnist  Department of [facility] Medicine  [facility]  Pager: [phone]

## 2018-05-10 NOTE — DISCHARGE SUMMARY
[facility] Hospital Medicine  Discharge Summary      Patient Name: Jigar Michaels  MRN: [id number]  Admission Date: 4/26/2018  Hospital Length of Stay: 14 days  Discharge Date and Time: May 10, 2018  Attending Physician: Dorina Summers MD   Discharging Provider: Dorina Summers MD  Primary Care Provider: Hannah Andino MD      HPI:   Jigar Michaels is a 62 y.o. male that (in part)  has a past medical history of Diabetes mellitus; Hypertension; and Stroke.  Presents to [facility] Emergency Department complaining of generalized weakness.  Subacute onset 1 week ago with progressive worsening.  Last known to be normal several days ago.  Was found to be significantly worse this morning by the patient's family member reports significant worsening of the left upper and lower extremity chronic weakness from a previous stroke.  He is unable to communicate effectively and is coughing, which may be consistent with aspiration.  Denies fever, chills, or recent infection.  No recent hospitalizations.  No report of syncope or collapse.  Significantly decreased oral intake over the last few days.  Patient was refusing to come to the Hospital prior to this.  Unfortunately due to the current condition of the patient remained of the history is limited    In the emergency department routine laboratory studies, CT of the head, and a consultation to tele-neurology were performed.  There is concern for acute CVA.  He is being admitted for stroke workup and neurology consultation.  MRI of the morning.    Hospital medicine has been asked to admit for further evaluation and treatment.    * No surgery found *      Hospital Course:   Mr. Michaels was admitted with an acute CVA confirmed by MRI with concern for possible embolic cause. ECHO showed new CHF, therefore, Cardiology consulted.  Neurology consulted. Pt treated with permissive HTN, ASA, statin and was being considered for rehab placement.  Patient aspirated  after being fed some meat by wife on 4/28.  He became more hypoxic and transferred to ICU. Respiratory status continued to worsen and required endotracheal intubation. He was weaned off all sedation on 5/2 and remained off all sedation while on the ventilator. Pt only able to make eye contact intermittently, otherwise unable to move extremities when asked to and with babinski sign. Repeat MRI of the brain showed progression of his stroke. These clinical findings were concerning for significant basilar artery involvement. On 5/5, patient had more coughing spells and making more eye contact when called by his daughter in [place]. Fentanyl PRN given for comfort. However, patient without any significant neurological improvement and prognosis poor for any meaningful recovery. Wife agreed to DNR status, but reportedly wanted to proceed with trach and PEG placement. ENT and GI consulted. Plavix held temporarily in anticipation for these procedures. Family conference done on 5/8 and family seemed to be in agreement with withdrawal of care but wanted more time to finalize plan. Then on 5/9, another family conference was done and they agreed to initiate comfort measures and patient was terminally extubated. Pt was pronounced dead at 5:34 AM on Thursday, May 10, 2018. Family was at the bedside and condolences were given.     Consults:   Consults         Status Ordering Provider     Inpatient consult to Cardiology  Once     Provider:  Jayson Pringle MD    Completed TOM TOLENTINO     Inpatient consult to Gastroenterology  Once     Provider:  Dariel Miramontes MD    Completed NAHEED CALIXTO     Inpatient consult to Neurology  Once     Provider:  Lul Wylie MD    Completed TOM TOLENTINO     Inpatient consult to Pulmonology  Once     Provider:  Dulce Alegre MD    Completed AVEL ROD     Inpatient consult to Social Work/Case Management  Once     Provider:  (Not yet assigned)    Completed TOM TOLENTINO  POLO     IP consult to case management/social work  Once     Provider:  (Not yet assigned)    Completed SUNIL RODRIGUEZ     IP consult to dietary  Once     Provider:  (Not yet assigned)    Completed SUNIL RODRIGUEZ     IP consult to dietary  Once     Provider:  (Not yet assigned)    Completed SUNIL RODRIGUEZ        Service: Hospital Medicine    Final Active Diagnoses:    Diagnosis Date Noted POA    PRINCIPAL PROBLEM:  Cerebrovascular accident (CVA) due to stenosis of cerebral artery [I63.50] 2018 Yes    Comfort measures only status [Z51.5] 2018 Not Applicable    Goals of care, counseling/discussion [Z71.89] 2018 Not Applicable    Cerebellar atrophy [G31.9] 2018 Yes    Diffuse cerebral atrophy [G31.9] 2018 Yes    Atherosclerotic cerebrovascular disease [I67.2] 2018 Yes    On mechanically assisted ventilation [Z99.11] 2018 Not Applicable    Basilar artery stenosis/occlusion [I65.1] 2018 Yes    Gross hematuria [R31.0] 2018 Yes    Aspiration into airway [T17.908A] 2018 No    Hyperglycemia due to type 2 diabetes mellitus [E11.65] 2018 Yes    Acute renal failure [N17.9] 2018 Yes    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    Significant Diagnostic Studies:    Pending Diagnostic Studies:     None         Medications:  None (patient  at medical  facility)    Indwelling Lines/Drains at time of discharge:   Lines/Drains/Airways     Drain                 Urethral Catheter 04/29/18 1620 16 Fr. 10 days         Rectal Tube 05/08/18 1215 fecal management system 1 day                Time spent on the discharge of patient: 35 minutes    Dorina Summers MD  Department of Hospital Medicine  [facility]

## 2021-01-27 NOTE — ASSESSMENT & PLAN NOTE
Monitor UOP.    The patient appears stated age, disheveled, dressed appropriately. She was alert, calm and cooperative during the interview. She maintains limited eye contact with distant relatedness. No psychomotor agitation or retardation. Steady gait. The patient’s speech was fluent, normal in tone, rate and volume. The patient’s mood is “good” Affect is constricted but reactive, irritable. The patient’s thoughts are goal directed, but tangential and loose at times when discussing delusional content related to responding to internal stimuli. She has various delusions related to being part of a book or movie. Although she denies AH she is constantly responding to internal stimuli. She denies any suicidal or homicidal ideation, intent, or plan. Insight is poor. Judgment is impaired. Impulse control has been tenuous on the unit.

## 2024-02-01 NOTE — NURSING
Follow Up Note     Date: 2024   Patient Name: Stephanie Jean  MRN: [id number]  : 1958     Referring Physician: Nhung Shepard DO    Chief Complaint:    Chief Complaint   Patient presents with    Follow-up    Crohn's Disease       Interval History:   2024  Stephanie Jean is a 65 y.o. female who is here today for follow up for her abdominal symptoms constipation, abdominal bloating.  She is more or less doing well.  She has been having  daily bowel movement with the senna.  Occasionally takes lactulose.  Denies any significant nausea or vomiting.    2020  Stephanie Jean is a 61 y.o. female who is here today to establish care with Gastroenterology for evaluation of heartburn. History of upper abdominal pain mainly in the epigastric region since about 2-3 months. Pain started gradually, intermittent, aching pain lasting for about few minutes and occasionally longer.She was given dexilant and sucralfate which did not help initially and later on changed to pantoprazole.  She is on pantoprazole now with reasonable control of pain and reflux symptoms with once in few days but for the last 2 weeks she did not take any PPI. Associated significant heart burn.      She was on PPI before after she was noted to have gastritis on EGD about 10 years ago. Stopped herself year ago. Occasional problem with swallowing to solid food. She also has sjogrens syndrome.   She was mostly constipated now. Bowel movements every once in 2-3 days and occasionally once in 3-5 days. No lower abdominal pain.      There is no history of  hematochezia or melena. There is no history of anemia. Prior history of EGD about 10 yrs ago. Last colonoscopy in 2020 about 4 polyps removed and advised to repeat in 3 years time. Brother had UC and uncle from mother's side had colon Ca. No history of any abdominal surgery except hysterectomy. Denies alcohol abuse or cigarette smoking.  She is on arthrotec tablets    Pt arrived to [facility] floor via transport. NAD noted. Wife at bedside. AAOx4, with some slurred speech and . L sided weakness noted with drifting to LLE and LUE. Pt oriented to room, bed in low position, call light within reach. Understands NPO status as of now. Skin intact. Urinal within reach. VSSAF. Tele box #[id number] placed. No other complaints at this time.   "    Subjective      Past Medical History:   Past Medical History:   Diagnosis Date    Anemia 2021    Anxiety 2023    Arthritis     Cataract 2021    both eyes surgery for cataract    Cognitive impairment     per patient evaluated at , Vascular cognitive impairment    Colon polyps     Coronary artery anomaly     pt reports \"was born with and they chose not to do anything with it\"    Depression 2022    Dysphagia     Elevated cholesterol     Family history of colon cancer     Fibromyalgia     Fibromyalgia, primary 2003    GERD (gastroesophageal reflux disease)     GI (gastrointestinal bleed) 8/8/2022    Heart murmur     Hoarseness     Hyperlipidemia     Hypertension 2021    Inflammatory bowel disease 2022    recent hospitalization for colitis    Irritable bowel syndrome     Ischemic colitis 08/11/2023    Low back pain 2003    treated by [facility]    Lupus     Marijuana use     Daily     Osteopenia 2021    Osteopenia    Pulmonary nodule     Sjogren's disease     Urinary tract infection 2022    Vasovagal syncope      Past Surgical History:   Past Surgical History:   Procedure Laterality Date    ABDOMINAL SURGERY  1998    [facility]    APPENDECTOMY      BREAST SURGERY  1975    lumpectomy 1975    CATARACT EXTRACTION W/ INTRAOCULAR LENS IMPLANT Left 12/20/2021    Procedure: CATARACT PHACO EXTRACTION WITH INTRAOCULAR LENS IMPLANT LEFT COMPLICATED WITH MALYUGIN RING;  Surgeon: Zachary Landers MD;  Location: [facility] OR;  Service: Ophthalmology;  Laterality: Left;    CATARACT EXTRACTION W/ INTRAOCULAR LENS IMPLANT Right 01/03/2022    Procedure: CATARACT PHACO EXTRACTION WITH INTRAOCULAR LENS IMPLANT RIGHT;  Surgeon: Zachary Landers MD;  Location: [facility] OR;  Service: Ophthalmology;  Laterality: Right;    COLONOSCOPY      COLONOSCOPY N/A 12/12/2022    Procedure: COLONOSCOPY WITH BIOPSIES AND POLYPECTOMY;  Surgeon: Chaparro Marrero MD;  Location: [facility] ENDOSCOPY;  Service: Gastroenterology;  " Laterality: N/A;    ENDOSCOPY      ENDOSCOPY N/A 2020    Procedure: ESOPHAGOGASTRODUODENOSCOPY;  Surgeon: Chaparro Marrero MD;  Location: [facility] ENDOSCOPY;  Service: Gastroenterology;  Laterality: N/A;    EYE SURGERY      cataract removal    HYSTERECTOMY      TOTAL ABDOMINAL HYSTERECTOMY WITH SALPINGO OOPHORECTOMY      TUBAL ABDOMINAL LIGATION      UPPER GASTROINTESTINAL ENDOSCOPY         Family History:   Family History   Problem Relation Age of Onset    Hypertension Mother     Aneurysm Mother     Heart disease Mother         passed away at 54 yr old after 5 vessel bypass    Hyperlipidemia Mother     Stroke Mother         aneurysm ruptured    Emphysema Father     COPD Father     Ulcerative colitis Brother     Alcohol abuse Brother     Cancer Brother         stomach cancer    COPD Brother     Hyperlipidemia Brother     Liver disease Brother     Cirrhosis Brother     Colon polyps Brother     Stomach cancer Brother     Colon cancer Maternal Uncle     Arthritis Maternal Uncle     Cancer Maternal Aunt         breast cancer    Cancer Sister         breast    COPD Paternal Uncle     Early death Brother         ulcerative colitis    Learning disabilities Brother         mental retardation    Mental illness Brother         schizophrenia    Ulcerative colitis Brother     Kidney disease Paternal Aunt         nephrectomy    Liver cancer Neg Hx     Crohn's disease Neg Hx        Social History:   Social History     Socioeconomic History    Marital status:    Tobacco Use    Smoking status: Former     Packs/day: 1.00     Years: 40.00     Additional pack years: 0.00     Total pack years: 40.00     Types: Cigarettes     Quit date: 2018     Years since quittin.0    Smokeless tobacco: Never   Vaping Use    Vaping Use: Never used   Substance and Sexual Activity    Alcohol use: No    Drug use: Not Currently     Types: Marijuana     Comment: OCCASIONALLY    Sexual activity: Yes     Partners: Male      Birth control/protection: Post-menopausal, Hysterectomy       Medications:     Current Outpatient Medications:     aspirin 81 MG EC tablet, Take 1 tablet by mouth. Every other day, Disp: , Rfl:     atorvastatin (LIPITOR) 20 MG tablet, TAKE 1 TABLET BY MOUTH EVERY NIGHT, Disp: 90 tablet, Rfl: 3    azaTHIOprine (IMURAN) 50 MG tablet, TAKE 2 TABLETS BY MOUTH DAILY, Disp: 180 tablet, Rfl: 3    busPIRone (BUSPAR) 10 MG tablet, TAKE 1 TABLET BY MOUTH TWICE DAILY AS NEEDED FOR ANXIETY, Disp: 180 tablet, Rfl: 3    cyclobenzaprine (FLEXERIL) 10 MG tablet, TAKE 1 TABLET BY MOUTH THREE TIMES DAILY, Disp: 270 tablet, Rfl: 3    Diclofenac Sodium (VOLTAREN) 1 % gel gel, diclofenac 1 % topical gel  APPLY 1 GRAM TOPICALLY TO THE AFFECTED AREA TWICE DAILY AS NEEDED FOR PAIN, Disp: , Rfl:     docusate sodium (COLACE) 100 MG capsule, TAKE 1 CAPSULE BY MOUTH TWICE DAILY (Patient taking differently: Take 2 capsules by mouth 2 (Two) Times a Day.), Disp: 60 capsule, Rfl: 2    donepezil (Aricept) 5 MG tablet, Take 1 tablet by mouth Every Night., Disp: 30 tablet, Rfl: 6    DULoxetine (CYMBALTA) 60 MG capsule, Take 1 capsule by mouth Daily., Disp: 90 capsule, Rfl: 3    fluticasone (FLONASE) 50 MCG/ACT nasal spray, SHAKE LIQUID AND USE 1 TO 2 SPRAYS IN EACH NOSTRIL EVERY DAY AS NEEDED, Disp: , Rfl:     furosemide (LASIX) 20 MG tablet, TAKE 1 TABLET BY MOUTH DAILY AS NEEDED FOR SWELLING, Disp: 30 tablet, Rfl: 1    gabapentin (NEURONTIN) 800 MG tablet, Take 1 tablet by mouth 3 (Three) Times a Day., Disp: , Rfl:     HYDROcodone-acetaminophen (NORCO)  MG per tablet, Take 1 tablet by mouth 2 (Two) Times a Day., Disp: , Rfl:     hydrOXYzine pamoate (VISTARIL) 25 MG capsule, TAKE 1 CAPSULE UP TO 3 TIMES A DAY AS DIRECTED, Disp: 90 capsule, Rfl: 1    inFLIXimab (REMICADE IV), Infuse  into a venous catheter Take As Directed., Disp: , Rfl:     lisinopril (PRINIVIL,ZESTRIL) 10 MG tablet, Take 1 tablet by mouth Every Night., Disp: 90 tablet,  Rfl: 3    nitroglycerin (NITROSTAT) 0.4 MG SL tablet, Place 1 tablet under the tongue Every 5 (Five) Minutes As Needed., Disp: , Rfl:     pantoprazole (PROTONIX) 20 MG EC tablet, Take 1 tablet by mouth Daily., Disp: 90 tablet, Rfl: 3    promethazine (PHENERGAN) 12.5 MG tablet, Take 1 tablet by mouth Every 8 (Eight) Hours As Needed for Nausea or Vomiting., Disp: 30 tablet, Rfl: 2    Restasis 0.05 % ophthalmic emulsion, Administer 1 drop to both eyes 2 (Two) Times a Day., Disp: , Rfl:     rOPINIRole (REQUIP) 2 MG tablet, TAKE 1 TABLET BY MOUTH EVERY DAY(1 TO 3 HOURS BEFORE BEDTIME), Disp: 90 tablet, Rfl: 1    vitamin B-12 (CYANOCOBALAMIN) 1000 MCG tablet, Take 1 tablet by mouth Daily. (Patient taking differently: Take 1 tablet by mouth Every Other Day.), Disp: 90 tablet, Rfl: 3    vitamin D (ERGOCALCIFEROL) 1.25 MG (82221 UT) capsule capsule, TAKE 1 CAPSULE BY MOUTH ONCE A WEEK, Disp: 15 capsule, Rfl: 3    lactulose (CHRONULAC) 10 GM/15ML solution, Take 15 mL by mouth Daily. (Patient not taking: Reported on 2/1/2024), Disp: 450 mL, Rfl: 2    loratadine (CLARITIN) 10 MG tablet, Take 1 tablet by mouth As Needed. (Patient not taking: Reported on 2/1/2024), Disp: , Rfl:     Allergies:   Allergies   Allergen Reactions    Penicillins Rash       Review of Systems:   Review of Systems   Constitutional:  Negative for appetite change, fatigue, fever and unexpected weight loss.   HENT:  Negative for trouble swallowing.    Gastrointestinal:  Positive for abdominal pain, constipation and nausea. Negative for abdominal distention, anal bleeding, blood in stool, diarrhea, rectal pain, vomiting, GERD and indigestion.       The following portions of the patient's history were reviewed and updated as appropriate: allergies, current medications, past family history, past medical history, past social history, past surgical history and problem list.    Objective     Physical Exam:  Vital Signs:   Vitals:    02/01/24 1446   BP: 140/78    Pulse: 76   SpO2: 98%   Weight: 63.7 kg (140 lb 6.4 oz)       Physical Exam  Constitutional:       Appearance: Normal appearance.   HENT:      Head: Normocephalic and atraumatic.   Eyes:      Conjunctiva/sclera: Conjunctivae normal.   Abdominal:      General: Abdomen is flat. There is no distension.      Palpations: There is no mass.      Tenderness: There is no abdominal tenderness. There is no guarding or rebound.      Hernia: No hernia is present.   Musculoskeletal:      Cervical back: Normal range of motion and neck supple.   Neurological:      Mental Status: She is alert.         Results Review:   I reviewed the patient's new clinical results.    Lab on 12/12/2023   Component Date Value Ref Range Status    Glucose 12/12/2023 116 (H)  65 - 99 mg/dL Final    BUN 12/12/2023 19  8 - 23 mg/dL Final    Creatinine 12/12/2023 0.91  0.57 - 1.00 mg/dL Final    Sodium 12/12/2023 142  136 - 145 mmol/L Final    Potassium 12/12/2023 4.2  3.5 - 5.2 mmol/L Final    Chloride 12/12/2023 107  98 - 107 mmol/L Final    CO2 12/12/2023 26.0  22.0 - 29.0 mmol/L Final    Calcium 12/12/2023 9.3  8.6 - 10.5 mg/dL Final    Total Protein 12/12/2023 6.4  6.0 - 8.5 g/dL Final    Albumin 12/12/2023 4.1  3.5 - 5.2 g/dL Final    ALT (SGPT) 12/12/2023 17  1 - 33 U/L Final    AST (SGOT) 12/12/2023 22  1 - 32 U/L Final    Alkaline Phosphatase 12/12/2023 81  39 - 117 U/L Final    Total Bilirubin 12/12/2023 <0.2  0.0 - 1.2 mg/dL Final    Globulin 12/12/2023 2.3  gm/dL Final    A/G Ratio 12/12/2023 1.8  g/dL Final    BUN/Creatinine Ratio 12/12/2023 20.9  7.0 - 25.0 Final    Anion Gap 12/12/2023 9.0  5.0 - 15.0 mmol/L Final    eGFR 12/12/2023 69.7  >60.0 mL/min/1.73 Final    WBC 12/12/2023 3.75  3.40 - 10.80 10*3/mm3 Final    RBC 12/12/2023 3.42 (L)  3.77 - 5.28 10*6/mm3 Final    Hemoglobin 12/12/2023 10.9 (L)  12.0 - 15.9 g/dL Final    Hematocrit 12/12/2023 31.6 (L)  34.0 - 46.6 % Final    MCV 12/12/2023 92.4  79.0 - 97.0 fL Final    MCH 12/12/2023  31.9  26.6 - 33.0 pg Final    MCHC 12/12/2023 34.5  31.5 - 35.7 g/dL Final    RDW 12/12/2023 13.9  12.3 - 15.4 % Final    RDW-SD 12/12/2023 46.2  37.0 - 54.0 fl Final    MPV 12/12/2023 11.1  6.0 - 12.0 fL Final    Platelets 12/12/2023 141  140 - 450 10*3/mm3 Final    Platelet clumping noted. Platelet count may be falsely decreased due to platelet clumping.        Neutrophil % 12/12/2023 50.7  42.7 - 76.0 % Final    Lymphocyte % 12/12/2023 38.7  19.6 - 45.3 % Final    Monocyte % 12/12/2023 8.0  5.0 - 12.0 % Final    Eosinophil % 12/12/2023 1.6  0.3 - 6.2 % Final    Basophil % 12/12/2023 0.5  0.0 - 1.5 % Final    Neutrophils, Absolute 12/12/2023 1.90  1.70 - 7.00 10*3/mm3 Final    Lymphocytes, Absolute 12/12/2023 1.45  0.70 - 3.10 10*3/mm3 Final    Monocytes, Absolute 12/12/2023 0.30  0.10 - 0.90 10*3/mm3 Final    Eosinophils, Absolute 12/12/2023 0.06  0.00 - 0.40 10*3/mm3 Final    Basophils, Absolute 12/12/2023 0.02  0.00 - 0.20 10*3/mm3 Final    Infliximab Drug Level 12/12/2023 15  ug/mL Final    Quantitation Limit: <0.4 ug/mL  Results of 0.4 or higher indicate detection of infliximab.  Comments:       - The optimal drug concentration depends upon         patient-specific factors including the disease and         desired therapeutic endpoint.       - Maintenance trough concentrations >=5 may be         associated with higher remission rates.(1)       - In severe CD, higher trough levels (>10) may         be necessary to achieve fistula healing.(2)       - In rheumatoid arthritis, EULAR responders had higher         median trough levels (3.6) than non-responders         (0.5).(3)       - This assay measures the antibody-unbound (free)         fraction of infliximab when serum anti-infliximab         antibodies are present.    Anti-Infliximab Antibody 12/12/2023 <22  ng/mL Final    Comment:     Interpretation:      The above result is an UNDETECTED Antibody titer.      Quantitation Limit: <22 ng/mL.      Results of 22  or higher indicate detection of anti-      infliximab antibodies.      22 - 200 ng/mL: LOW titer      201 - 1,000 ng/mL: INTERMEDIATE titer      1,001 or greater ng/mL: HIGH titer  Comments:       - Anti-drug antibody levels should be interpreted in         the context of the concomitant free drug trough         concentration.       - Low titer anti-drug antibodies may be transient         while high titers are likely to be more         consequential.(4-6)       - Some immunogenicity is reversible. Elimination of         intermediate titer (and even some high titer)         anti-infliximab antibodies has been achieved with         dose escalation and/or methotrexate or 6-MP.(7)       - Maintenance of drug levels greater than 3 ug/mL may         reduce the risk of developing anti-drug         antibodies.(8)       - This anti-infliximab antibody assay is drug                            tolerant,         and all positive results are verified for anti-drug         specificity by a confirmatory test.  References:  1. Shilpi GARCIA et al. AGA Review on TDM in IBD.     Gastroenterol 2017;153:835-857.  2. Luis Eduardo A, et al. Gastroenterol 2016;150(4):O264-X784.  3. Wolbink GJ, et al. Jennifer Rheum Dis 2005;64:704-707.  4. Steenholdt C, et al. Inflamm Bowel Dis 2012;18(12):     1617-9178.  5. Shilpi GARCIA, et al. Am J Gastroenterol 2013;108:     962-971.  6. Richard H, et al. Clin Gastroenterol Hepatol 2015;13(3):     522-530.  7. Catrina A, et al. Gastroenterol 2019;156(6):S-617.  8. Kandi JF, et al. Gastroenterol 2016;150(4):S144.  9. Stephen MCKINNEY, et al. AAPS Journal 2016 DOI:10.1208/     x02219-575-6959-1.  These tests were developed and their performance  characteristics determined by [employer].  They have not been  cleared or approved by the Food and Drug Administration.  However, these electrochemiluminescence immunoassay (ECLIA)  measurements of infliximab and                            anti-infliximab  antibody  (constituting DoseASSURE IFX) have been developed and  validated in accordance with CLIA (Clinical Laboratory  Improvement Amendments) and the FDA Guidance document, Assay  Development and Validation for Immunogenicity Testing of  Therapeutic Protein Products (2019). Results of a  peer-reviewed methods comparison / validation of these  assays have been published.(9)    RBC Morphology 12/12/2023 Normal  Normal Final    WBC Morphology 12/12/2023 Normal  Normal Final    Platelet Estimate 12/12/2023 Adequate  Normal Final    Clumped Platelets 12/12/2023 Present  None Seen Final       CT Chest Low Dose Cancer Screening WO    Result Date: 12/21/2023  1. No evidence of primary lung neoplasm  LUNG RADS CATEGORY: 2, benign appearance  RECOMMENDATION: LDCT in 12 months    This study was performed with techniques to keep radiation doses as low as reasonably achievable (ALARA). Individualized dose reduction techniques using automated exposure control or adjustment of vA and/or kV according to the patient size were employed.     This report was signed and finalized on 12/21/2023 9:30 AM by Joey Nam MD.      Mammo Screening Digital Tomosynthesis Bilateral With CAD    Result Date: 11/16/2023  No mammographic evidence of malignancy. BI-RADS CATEGORY: Overall: 2 - Benign RECOMMENDATION:      - Routine Screening Mammogram in 1 Year. Patient Lifetime Risk Score of Breast Malignancy: 10.9 % This risk assessment is calculated using the [place] Risk Assessment model which may underestimate the lifetime risk of breast malignancy. COMMUNICATION: Computer-aided detection (CAD) and tomosynthesis were utilized by the radiologist in the interpretation of this examination. The results and recommendations will be sent to the patient in a printed lay language version of the imaging report.         CT Abdomen Pelvis With Contrast     Result Date: 8/11/2023  Wall thickening, submucosal edema and mucosal enhancement from the distal  transverse colon to the rectum in this patient with known Crohn's disease; findings slightly improved compared to 1 year prior.  Air-fluid levels in nondistended small bowel, suspect ileus.  This report was signed and finalized on 8/11/2023 4:01 PM by Samantha Allen MD.        11/15/2022 PillCam: Few scattered gastric erosions. Multiple small and medium size healing erosions and superficial ulcerations identified throughout the ileum. No deep ulcerations except one small proximal ileal ulceration. These findings with anemia and recent colitis is more suggestive of ileocolonic crohn's disease.     Colonoscopy by Dr. Marrero 12/12/2022:  - The perianal and digital rectal examinations were normal.  - A 3 mm polyp was found in the cecum. The polyp was sessile. The polyp was removed with a cold snare. Resection and retrieval were complete.  - A 4 mm polyp was found in the sigmoid colon. The polyp was flat. The polyp was removed with a cold snare. Resection and retrieval were complete.  - Diffuse moderate inflammation characterized by altered vascularity, congestion (edema), erosions, erythema, friability and granularity was found in the rectum, in the recto-sigmoid colon, in the sigmoid colon and in the descending colon. Biopsies were taken with a cold forceps for histology.  - A medium healed ulcer was found in the mid descending colon. The scar tissue was healthy in appearance.  - The transverse colon, hepatic flexure, ascending colon, cecum, appendiceal orifice and ileocecal valve appeared normal. Biopsies were taken with a cold forceps for histology.  - The terminal ileum appeared normal. Biopsies were taken with a cold forceps for histology.  Pathology DIAGNOSIS:   A.   TERMINAL ILEUM BIOPSY:   Benign small bowel, consistent with terminal ileum   Negative for significant architectural distortion, inflammatory infiltrate,   neoplasia, malignancy   B.   CECUM BIOPSY:   Benign colonic mucosa, negative for significant  architectural distortion,   inflammatory infiltrate, neoplasia, dysplasia, malignancy   C.   ASCENDING COLON BIOPSY:   Benign colonic mucosa, negative for significant architectural distortion,   inflammatory infiltrate, neoplasia, dysplasia, malignancy   D.   TRANSVERSE COLON BIOPSY:   Benign colonic mucosa, negative for significant architectural distortion,   inflammatory infiltrate, neoplasia, dysplasia, malignancy   E.   DESCENDING COLON BIOPSY:   Minimal active colitis without significant accompanying chronicity   Negative for dysplasia, neoplasia, malignancy   See comment   F.   SIGMOID COLON BIOPSY:   Minimal active colitis without significant chronicity   Negative for dysplasia, neoplasia,malignancy   See comment   G.   RECTAL BIOPSY:   Minimal active colitis without significant chronicity   Negative for dysplasia, neoplasia, malignancy   See comment   H.   CECUM POLYP:   Tubular adenoma   Negative for high-grade dysplasia/malignancy   I.   SIGMOID COLON POLYP:   Hyperplastic polyp   Accompanying inflammatory changes   COMMENT:  E-G.  There is minimal active colitis with only rare neutrophils   identified within the mucosal surfaces.  There is no evidence of viral cytopathic effect, significant chronic alteration, dysplasia, neoplasia, malignancy, or granulomatous inflammation.  These findings are not specific and could be seen with an infectious process, acute self-limited colitis, or even potentially inflammatory bowel disease in the appropriate clinical/endoscopic setting. Clinical correlation is needed      Assessment / Plan      1. Crohn's disease of both small and large intestine with acute flare  2. Suspected arthropathy associated with inflammatory bowel disease / RA   3. Chronic iron deficiency anemia  4. Vitamin B12 deficiency  5.  Rheumatoid arthritis  6.  Family history of inflammatory bowel disease-UC with brother  7.  Gastroesophageal reflux disease without esophagitis  8.  Long-term opioid use  with constipation /irritable bowel  2/1/2024  Patient is clinically doing much better.  Been having daily bowel movement with the senna 2 tablets daily with the lactulose as needed.  Recent lab work done on 12/12/2023 reviewed.  Remicade level was 15 with antibodies less than 22.  Vitamin D level was normal.  Hemoglobin is still low at 10.9 g/dL.  WBC 3.75.  MP was normal except borderline glucose of 116.  Recent Vitamin B12 over 2000.  CT angiogram done on 10/5/2023 revealed no evidence of major peripheral vascular disease    Will continue senna 2 tablets p.o. daily as before  Imuran 100 g p.o. daily  Continue Remicade infusion 5 mg/kg every 8 week  We will start her on iron pills  We will continue low-dose Protonix 20 g p.o. daily  Phenergan 25 mg p.o. 3 times daily as needed for nausea  Dermatology referral for skin cancer surveillance while on Imuran  CBC CMP  Colonoscopy later this year or next year 2025 for surveillance  Cut down smoking     8/31/2023  Record from [facility] reviewed. Patient did not receive her first Remicade maintenance infusion on time and had to wait for about 6 weeks.  First maintenance dose was in July 12.  And next dose is pending for September 12. Patient developed bloody diarrhea with abdominal pain and came to ED on 8/11/2023 and later on was transferred to [facility] due to no coverage from GI here.  Her CT abdomen pelvis done on 8/11/2023 revealed revealed a circumferential wall thickening from distal transverse colon to the rectum findings were in fact less pronounced than the previous findings with the prior CT scans.  There was also fluid-filled small bowel loops nondilated with air-fluid levels from ileus.     Stool C. difficile and GI panel was negative.  Her thiopurine metabolites level was only 55 indicating subtherapeutic level and 6-mercaptopurine level was 850 within limit.  Her random infliximab level was 19.28 without any antibodies.  She had a  colonoscopy done by Dr. Valencia at [facility] on 8/15/2023 and reported as having severe inflammation with ulcerations on the left side colon that may indicate ischemic colitis.  Full colonoscopy and pathology report not available to review.  Her lab work done on 8/22/2023 revealed a normal CMP.  Hemoglobin is 9.8 g/dL.  CRP 1.77.     Although colonoscopy findings suggested possible left-sided ischemic colitis.  These findings were similar to prior colitis findings are chronic , was present with a CT scan a year ago, and persistent findings noted with the colonoscopy done in December 2022, suggesting chronic inflammation. These findings suggest Crohn's flare.  Given her gap in her maintenance Remicade dose infusion for 6 weeks, played a role on this flare of unclear.  However her random Remicade level done was therapeutic and very high level midway through her treatment.      3/29/2023   Her stool calprotectin was 25 within normal limits.  Hemoglobin is still low at 11 g/dL.  Vitamin B12 is low at 199.She had a 2 induction dose of Remicade now third 1 is pending.  Initially prescribed Humira however she could not afford co-pay.     After review of recent pillcam and colonoscopy, it seems that she has chronic inflammation which may have been contributing to her anemia throughout the years. Some element of anemia of chronic disease suspected with her rheumatoid arthritis. She had erosions and ulceration in the small intestine on PillCam. Colonoscopy and colon biopsies show active colitis. History and endoscopic findings consistent with a new diagnosis of Crohn's disease. She is currently complaining of bloating, mucous in stools, mild rectal bleeding and loose stools. This is a change in bowel habits from her previous constipation. Initially diagnosed with colitis in August 2022 with bloody diarrhea.  CT abdomen pelvis done with contrast in August 2022 revealed a left-sided colon colitis.  Subsequently she had a  colonoscopy done by Dr. Morel on 8/17/2022 which did not reveal any endoscopic signs of colitis.  However TI was not visualized.  Random colon biopsies done were all normal without any signs of colitis.  Prior EGD done in 2020 did not reveal any upper GI source of bleeding. Her brother has ulcerative colitis. Previously with reports of constipation at baseline. Now having stools daily. In the past, she took MiraLAX, senna with relief. Had taken Linzess and Movantik before which did not help her. Serum porphyrin normal.      Prior history  9. Tubular adenoma of colon  Colonoscopy 12/2022 had 2 small polyps, 1 was tubular adenoma without dysplasia and other was hyperplastic.  No family history of any colon cancer with a first-degree relatives. Due to new diagnosis of IBD, she will likely need repeat colonoscopy in 1-3 years depending on clinical course          Follow Up:   No follow-ups on file.    Chaparro Marrero MD  [facility] [place]  2/1/2024  14:51 EST     Please note that portions of this note may have been completed with a voice recognition program.